# Patient Record
Sex: MALE | Race: WHITE | Employment: OTHER | URBAN - METROPOLITAN AREA
[De-identification: names, ages, dates, MRNs, and addresses within clinical notes are randomized per-mention and may not be internally consistent; named-entity substitution may affect disease eponyms.]

---

## 2016-07-19 LAB — COLONOSCOPY, EXTERNAL: NORMAL

## 2017-01-05 ENCOUNTER — HOSPITAL ENCOUNTER (EMERGENCY)
Age: 67
Discharge: HOME | End: 2017-01-05
Attending: EMERGENCY MEDICINE

## 2017-01-05 ENCOUNTER — HOSPITAL ENCOUNTER (OUTPATIENT)
Dept: LAB | Age: 67
Discharge: HOME | End: 2017-01-05

## 2017-01-05 ENCOUNTER — APPOINTMENT (OUTPATIENT)
Dept: GENERAL RADIOLOGY | Age: 67
End: 2017-01-05
Attending: PHYSICIAN ASSISTANT

## 2017-01-05 VITALS
WEIGHT: 177 LBS | RESPIRATION RATE: 16 BRPM | HEART RATE: 86 BPM | HEIGHT: 70 IN | OXYGEN SATURATION: 100 % | TEMPERATURE: 98.3 F | BODY MASS INDEX: 25.34 KG/M2 | DIASTOLIC BLOOD PRESSURE: 77 MMHG | SYSTOLIC BLOOD PRESSURE: 135 MMHG

## 2017-01-05 DIAGNOSIS — J06.9 ACUTE UPPER RESPIRATORY INFECTION: Primary | ICD-10-CM

## 2017-01-05 LAB — S PYO AG THROAT QL: NEGATIVE

## 2017-01-05 PROCEDURE — 87070 CULTURE OTHR SPECIMN AEROBIC: CPT | Performed by: PHYSICIAN ASSISTANT

## 2017-01-05 RX ORDER — CODEINE PHOSPHATE AND GUAIFENESIN 10; 100 MG/5ML; MG/5ML
5 SOLUTION ORAL
Qty: 120 ML | Refills: 0 | Status: SHIPPED | OUTPATIENT
Start: 2017-01-05 | End: 2020-01-09 | Stop reason: ALTCHOICE

## 2017-01-05 NOTE — DISCHARGE INSTRUCTIONS

## 2017-01-05 NOTE — UC PROVIDER NOTE
Patient is a 78 y.o. male presenting with sinus pain. The history is provided by the patient. Sinus Pain    This is a new problem. The current episode started more than 2 days ago. The problem has not changed since onset. There has been no fever. The pain is moderate. The pain has been intermittent since onset. Associated symptoms include congestion, sinus pressure, cough, rhinorrhea and headaches. Pertinent negatives include no chills, no sweats, no ear pain and no sore throat. Past Medical History   Diagnosis Date    Benign bladder papilloma     CAD (coronary artery disease) 10/7/2009     s/p CABG x 5, radial artery and internal mammary, nl stress test 3/08    Carotid artery plaque      mild on life line screening 6/11    Cataract     Diverticulosis     ED (erectile dysfunction) 10/7/2009    Hyperlipidemia LDL goal < 70 10/7/2009    Hypertension 10/7/2009    Keratitis     Low back pain 10/7/2009        Past Surgical History   Procedure Laterality Date    Endoscopy, colon, diagnostic       no polyps 2006    Pr cardiac surg procedure unlist       CABG x 5 vessels    Hx tonsillectomy      Hx other surgical       pilonidal cyst removed    Hx coronary artery bypass graft       x 5 arteries     Colonoscopy N/A 7/19/2016     COLONOSCOPY performed by [facility] at [facility] ENDOSCOPY    Hx colonoscopy           Family History   Problem Relation Age of Onset    Arthritis-osteo Mother     Hypertension Mother     Stroke Mother     High Cholesterol Mother     Cataract Mother     Other Mother      duodenal ulcer    Heart Disease Father     Cataract Father     Thyroid Disease Sister     High Cholesterol Sister     Cataract Sister         Social History     Social History    Marital status:      Spouse name: N/A    Number of children: N/A    Years of education: N/A     Occupational History    Not on file.      Social History Main Topics    Smoking status: Former Smoker     Quit date: 3/31/1980    Smokeless tobacco: Never Used    Alcohol use Yes      Comment: occasional    Drug use: No    Sexual activity: Yes     Partners: Female     Other Topics Concern    Not on file     Social History Narrative                ALLERGIES: Review of patient's allergies indicates no known allergies. Review of Systems   Constitutional: Negative for chills. HENT: Positive for congestion, rhinorrhea and sinus pressure. Negative for ear pain and sore throat. Respiratory: Positive for cough. Neurological: Positive for headaches. Vitals:    01/05/17 0909   BP: 135/77   Pulse: 86   Resp: 16   Temp: 98.3 °F (36.8 °C)   SpO2: 100%   Weight: 80.3 kg (177 lb)   Height: 5' 10\" (1.778 m)       Physical Exam   Constitutional: He is oriented to person, place, and time. He appears well-developed and well-nourished. HENT:   Right Ear: External ear normal.   Left Ear: External ear normal.   Cardiovascular: Normal rate, regular rhythm and normal heart sounds. Pulmonary/Chest: Effort normal and breath sounds normal.   Neurological: He is alert and oriented to person, place, and time. Skin: Skin is warm. Psychiatric: He has a normal mood and affect. His behavior is normal. Thought content normal.   Nursing note and vitals reviewed. MDM     Differential Diagnosis; Clinical Impression; Plan:     CLINICAL IMPRESSION:  Acute upper respiratory infection  (primary encounter diagnosis)    Plan:  1. Robitussin TAMRA  2.   3.   Risk of Significant Complications, Morbidity, and/or Mortality:   Presenting problems: Moderate  Diagnostic procedures: Moderate  Management options:   Moderate  Progress:   Patient progress:  Stable      Procedures

## 2017-01-07 LAB
BACTERIA SPEC CULT: NORMAL
SERVICE CMNT-IMP: NORMAL

## 2017-01-13 ENCOUNTER — TELEPHONE (OUTPATIENT)
Dept: INTERNAL MEDICINE CLINIC | Age: 67
End: 2017-01-13

## 2017-01-13 DIAGNOSIS — R05.9 COUGH: Primary | ICD-10-CM

## 2017-01-13 RX ORDER — BENZONATATE 200 MG/1
200 CAPSULE ORAL
Qty: 30 CAP | Refills: 1 | Status: SHIPPED | OUTPATIENT
Start: 2017-01-13 | End: 2020-01-09 | Stop reason: ALTCHOICE

## 2017-04-14 ENCOUNTER — OFFICE VISIT (OUTPATIENT)
Dept: CARDIOLOGY CLINIC | Age: 67
End: 2017-04-14

## 2017-04-14 VITALS
HEART RATE: 60 BPM | WEIGHT: 172 LBS | RESPIRATION RATE: 16 BRPM | BODY MASS INDEX: 24.62 KG/M2 | DIASTOLIC BLOOD PRESSURE: 72 MMHG | OXYGEN SATURATION: 97 % | HEIGHT: 70 IN | SYSTOLIC BLOOD PRESSURE: 124 MMHG

## 2017-04-14 DIAGNOSIS — I25.10 CORONARY ARTERY DISEASE INVOLVING NATIVE CORONARY ARTERY OF NATIVE HEART WITHOUT ANGINA PECTORIS: Primary | ICD-10-CM

## 2017-04-14 DIAGNOSIS — I77.9 CAROTID ARTERY DISEASE WITHOUT CEREBRAL INFARCTION (HCC): ICD-10-CM

## 2017-04-14 DIAGNOSIS — E78.5 HYPERLIPIDEMIA WITH TARGET LDL LESS THAN 70: ICD-10-CM

## 2017-04-14 DIAGNOSIS — I10 ESSENTIAL HYPERTENSION: ICD-10-CM

## 2017-04-14 NOTE — PROGRESS NOTES
HISTORY OF PRESENT ILLNESS  Lyric Farias is a 78 y.o. male     SUMMARY:   Problem List  Date Reviewed: 4/14/2017          Codes Class Noted    Advanced directives, counseling/discussion ICD-10-CM: Z71.89  ICD-9-CM: V65.49  11/21/2016    Overview Signed 11/21/2016  9:43 AM by Maria Isabel Johnson MD     On file             Keratitis ICD-10-CM: H16.9  ICD-9-CM: 370.9  Unknown        Carotid artery disease without cerebral infarction ([facility] [place] [address] ICD-10-CM: I77.9  ICD-9-CM: 447.9  4/9/2015        Benign bladder papilloma ICD-10-CM: D30.3  ICD-9-CM: 223.3  Unknown        Cataract ICD-10-CM: H26.9  ICD-9-CM: 366.9  Unknown        Hypertension ICD-10-CM: I10  ICD-9-CM: 401.9  10/7/2009        Hyperlipidemia with target LDL less than 70 ICD-10-CM: E78.5  ICD-9-CM: 272.4  10/7/2009        CAD (coronary artery disease) ICD-10-CM: I25.10  ICD-9-CM: 414.00  10/7/2009    Overview Addendum 3/31/2011  8:24 AM by Roberto Wing MD     Mr. Lock's cardiac history dates back to September 2004 when he presented with progressive angina, had an abnormal stress test and then cardiac catheterization, which showed significant left main and three-vessel coronary artery disease. He then underwent five-vessel bypass using LIMA and a free radial artery. LV function was normal.             ED (erectile dysfunction) ICD-10-CM: N52.9  ICD-9-CM: 607.84  10/7/2009        Low back pain ICD-10-CM: M54.5  ICD-9-CM: 724.2  10/7/2009              Current Outpatient Prescriptions on File Prior to Visit   Medication Sig    benzonatate (TESSALON) 200 mg capsule Take 1 Cap by mouth three (3) times daily as needed for Cough.  guaiFENesin-codeine (ROBITUSSIN AC) 100-10 mg/5 mL solution Take 5 mL by mouth three (3) times daily as needed for Cough. Max Daily Amount: 15 mL.  atorvastatin (LIPITOR) 80 mg tablet Take 1 Tab by mouth daily.  atenolol (TENORMIN) 50 mg tablet Take 1 Tab by mouth daily.     VITAMIN B COMPLEX (B COMPLEX PO) Take  by mouth. Takes one po once daily.  MULTIVITAMIN PO Take 1 Tab by mouth daily.  FOLIC ACID PO Take 5 mg by mouth daily.  tadalafil (CIALIS) 20 mg tablet Take 20 mg by mouth as needed.  coenzyme Q-10 (CO Q-10) 200 mg capsule Take 1 Cap by mouth daily.  aspirin (ASPIRIN) 325 mg tablet Take 325 mg by mouth daily. No current facility-administered medications on file prior to visit. CARDIOLOGY STUDIES TO DATE:  4/11 normal stress echo  6/11 life line screen , mild bilateral carotid disease  4/13 carotid dopplers 10-49% right and 0-9% left stenoses  4/15 carotid dopplers 10-49% right and 0-9% left stenoses      Chief Complaint   Patient presents with    Coronary Artery Disease     HPI :  Mr. Yesy Durbin is doing well. He continues to exercise regularly without any worrisome symptoms, and he is not having any problems with his medications. He has not had his lipids checked since last year. He and his wife and another couple are planning a trip to the [facility] in June.      CARDIAC ROS:   negative for chest pain, dyspnea, palpitations, syncope, orthopnea, paroxysmal nocturnal dyspnea, exertional chest pressure/discomfort, claudication, lower extremity edema    Family History   Problem Relation Age of Onset    Arthritis-osteo Mother     Hypertension Mother     Stroke Mother     High Cholesterol Mother     Cataract Mother     Other Mother      duodenal ulcer    Heart Disease Father     Cataract Father     Thyroid Disease Sister     High Cholesterol Sister    [facility] Cataract Sister        Past Medical History:   Diagnosis Date    Benign bladder papilloma     CAD (coronary artery disease) 10/7/2009    s/p CABG x 5, radial artery and internal mammary, nl stress test 3/08    Carotid artery plaque     mild on life line screening 6/11    Cataract     Diverticulosis     ED (erectile dysfunction) 10/7/2009    Hyperlipidemia LDL goal < 70 10/7/2009    Hypertension 10/7/2009    Keratitis     Low back pain 10/7/2009       GENERAL ROS:  A comprehensive review of systems was negative except for that written in the HPI.     Visit Vitals    /72 (BP 1 Location: Left arm, BP Patient Position: Sitting)    Pulse 60    Resp 16    Ht 5' 10\" (1.778 m)    Wt 172 lb (78 kg)    SpO2 97%    BMI 24.68 kg/m2       Wt Readings from Last 3 Encounters:   04/14/17 172 lb (78 kg)   01/05/17 177 lb (80.3 kg)   11/21/16 172 lb (78 kg)            BP Readings from Last 3 Encounters:   04/14/17 124/72   01/05/17 135/77   11/21/16 123/69       PHYSICAL EXAM  General appearance: alert, cooperative, no distress, appears stated age  Neck: supple, symmetrical, trachea midline, no adenopathy, no carotid bruit and no JVD  Lungs: clear to auscultation bilaterally  Heart: regular rate and rhythm, S1, S2 normal, no murmur, click, rub or gallop  Extremities: extremities normal, atraumatic, no cyanosis or edema    Lab Results   Component Value Date/Time    Cholesterol, total 143 11/21/2016 09:59 AM    Cholesterol, total 131 04/20/2016 10:12 AM    Cholesterol, total 160 11/05/2015 11:38 AM    Cholesterol, total 153 12/19/2014 08:13 AM    Cholesterol, total 130 10/30/2014 11:20 AM    HDL Cholesterol 61 11/21/2016 09:59 AM    HDL Cholesterol 61 04/20/2016 10:12 AM    HDL Cholesterol 62 11/05/2015 11:38 AM    HDL Cholesterol 60 12/19/2014 08:13 AM    HDL Cholesterol 52 10/30/2014 11:20 AM    LDL, calculated 66 11/21/2016 09:59 AM    LDL, calculated 57 04/20/2016 10:12 AM    LDL, calculated 78 11/05/2015 11:38 AM    LDL, calculated 76 12/19/2014 08:13 AM    LDL, calculated 63 10/30/2014 11:20 AM    Triglyceride 82 11/21/2016 09:59 AM    Triglyceride 64 04/20/2016 10:12 AM    Triglyceride 98 11/05/2015 11:38 AM    Triglyceride 85 12/19/2014 08:13 AM    Triglyceride 76 10/30/2014 11:20 AM    CHOL/HDL Ratio 2.3 05/19/2010 10:52 AM    CHOL/HDL Ratio 2.5 11/09/2009 11:49 AM    CHOL/HDL Ratio 2.8 01/23/2009 09:41 AM     ASSESSMENT  Mr. Lock is stable, asymptomatic and well-compensated on a good medical regimen. We are going to send him for a fasting lipid profile, and we will repeat his carotid Dopplers when he returns for follow-up next year. current treatment plan is effective, no change in therapy  lab results and schedule of future lab studies reviewed with patient  reviewed diet, exercise and weight control    Encounter Diagnoses   Name Primary?  Coronary artery disease involving native coronary artery of native heart without angina pectoris Yes    Essential hypertension     Hyperlipidemia with target LDL less than 70     Carotid artery disease without cerebral infarction ([facility] [address]      No orders of the defined types were placed in this encounter. Follow-up Disposition:  Return in about 1 year (around 4/14/2018).     Azael Roman MD  4/14/2017

## 2017-04-14 NOTE — MR AVS SNAPSHOT
Visit Information Date & Time Provider Department Dept. Phone Encounter #  
 4/14/2017 10:00 AM Morris Graves MD [facility] [phone] [id number] Follow-up Instructions Return in about 1 year (around 4/14/2018). Upcoming Health Maintenance Date Due DTaP/Tdap/Td series (2 - Td) 9/1/2016 MEDICARE YEARLY EXAM 11/22/2017 GLAUCOMA SCREENING Q2Y 12/5/2018 COLONOSCOPY 7/19/2026 Allergies as of 4/14/2017  Review Complete On: 4/14/2017 By: Morris Graves MD  
 No Known Allergies Current Immunizations  Reviewed on 11/21/2016 Name Date Influenza Vaccine 11/7/2014 Influenza Vaccine (Quad) PF 11/3/2016, 10/22/2015, 10/24/2013 Influenza Vaccine Split 10/9/2012, 10/12/2011, 10/11/2010, 10/13/2009 Pneumococcal Conjugate (PCV-13) 4/2/2015 Pneumococcal Polysaccharide (PPSV-23) 5/12/2016 TDAP Vaccine 9/1/2006 Zoster Vaccine, Live 9/4/2013 Not reviewed this visit You Were Diagnosed With   
  
 Codes Comments Coronary artery disease involving native coronary artery of native heart without angina pectoris    -  Primary ICD-10-CM: I25.10 ICD-9-CM: 414.01 Essential hypertension     ICD-10-CM: I10 
ICD-9-CM: 401.9 Hyperlipidemia with target LDL less than 70     ICD-10-CM: E78.5 ICD-9-CM: 272.4 Carotid artery disease without cerebral infarction [facility])     ICD-10-CM: I77.9 ICD-9-CM: 033. 9 Vitals BP Pulse Resp Height(growth percentile) Weight(growth percentile) SpO2  
 124/72 (BP 1 Location: Left arm, BP Patient Position: Sitting) 60 16 5' 10\" (1.778 m) 172 lb (78 kg) 97% BMI Smoking Status 24.68 kg/m2 Former Smoker BMI and BSA Data Body Mass Index Body Surface Area  
 24.68 kg/m 2 1.96 m 2 Preferred Pharmacy Pharmacy Name Phone [id number] [address] [facility] [phone] Your Updated Medication List  
  
   
This list is accurate as of: 4/14/17 10:25 AM.  Always use your most recent med list.  
  
  
  
  
 aspirin 325 mg tablet Commonly known as:  ASPIRIN Take 325 mg by mouth daily. atenolol 50 mg tablet Commonly known as:  TENORMIN Take 1 Tab by mouth daily. atorvastatin 80 mg tablet Commonly known as:  LIPITOR Take 1 Tab by mouth daily. B COMPLEX PO Take  by mouth. Takes one po once daily. benzonatate 200 mg capsule Commonly known as:  TESSALON Take 1 Cap by mouth three (3) times daily as needed for Cough. coenzyme Q-10 200 mg capsule Commonly known as:  CO Q-10 Take 1 Cap by mouth daily. FOLIC ACID PO Take 5 mg by mouth daily. guaiFENesin-codeine 100-10 mg/5 mL solution Commonly known as:  ROBITUSSIN AC Take 5 mL by mouth three (3) times daily as needed for Cough. Max Daily Amount: 15 mL. MULTIVITAMIN PO Take 1 Tab by mouth daily. tadalafil 20 mg tablet Commonly known as:  CIALIS Take 20 mg by mouth as needed. Follow-up Instructions Return in about 1 year (around 4/14/2018). Introducing [facility]! Dear Flaco Catalan: Thank you for requesting a [employer] account. Our records indicate that you already have an active [employer] account. You can access your account anytime at [email or web address] Did you know that you can access your hospital and ER discharge instructions at any time in [employer]? You can also review all of your test results from your hospital stay or ER visit. Additional Information If you have questions, please visit the Frequently Asked Questions section of the [employer] website at [email or web address] Remember, [employer] is NOT to be used for urgent needs. For medical emergencies, dial 911. Now available from your iPhone and Android! Please provide this summary of care documentation to your next provider. Your primary care clinician is listed as [id number] If you have any questions after today's visit, please call [phone].

## 2017-04-14 NOTE — LETTER
6/1/2017 10:30 AM 
 
Mr. Carrie Ayala [address] [facility] [address] Dear Carrie Ayala: Please find your most recent results below. Resulted Orders LIPID PANEL Result Value Ref Range Cholesterol, total 153 100 - 199 mg/dL Triglyceride 87 0 - 149 mg/dL HDL Cholesterol 58 >39 mg/dL VLDL, calculated 17 5 - 40 mg/dL LDL, calculated 78 0 - 99 mg/dL Narrative Performed at:  [address]  [id number] : Janette Alicea MD, Phone:  [phone] METABOLIC PANEL, COMPREHENSIVE Result Value Ref Range Glucose 93 65 - 99 mg/dL BUN 14 8 - 27 mg/dL Creatinine 1.01 0.76 - 1.27 mg/dL GFR est non-AA 77 >59 mL/min/1.73 GFR est AA 89 >59 mL/min/1.73  
 BUN/Creatinine ratio 14 10 - 24 Sodium 144 134 - 144 mmol/L Potassium 4.5 3.5 - 5.2 mmol/L Chloride 102 96 - 106 mmol/L  
 CO2 24 18 - 29 mmol/L Calcium 9.2 8.6 - 10.2 mg/dL Protein, total 6.9 6.0 - 8.5 g/dL Albumin 4.7 3.6 - 4.8 g/dL GLOBULIN, TOTAL 2.2 1.5 - 4.5 g/dL A-G Ratio 2.1 1.2 - 2.2 Bilirubin, total 0.5 0.0 - 1.2 mg/dL Alk. phosphatase 70 39 - 117 IU/L  
 AST (SGOT) 29 0 - 40 IU/L  
 ALT (SGPT) 23 0 - 44 IU/L Narrative Performed at:  [address]  [id number] : Janette Alicea MD, Phone:  [phone] CVD REPORT Result Value Ref Range INTERPRETATION Note Comment:  
   Supplement report is available. Narrative Performed at:  [address] [place] [address]  [id number] : Praful Cutler PhD, Phone:  [phone] RECOMMENDATIONS: Labs look great! Stay on medications and we will repeat labs in 6 months Please call me if you have any questions: [phone] Sincerely, Regine Alcocerr, MD Jalen Newell., RN

## 2017-05-31 ENCOUNTER — HOSPITAL ENCOUNTER (OUTPATIENT)
Dept: LAB | Age: 67
Discharge: HOME | End: 2017-05-31
Payer: MEDICARE

## 2017-05-31 PROCEDURE — 80061 LIPID PANEL: CPT

## 2017-05-31 PROCEDURE — 80053 COMPREHEN METABOLIC PANEL: CPT

## 2017-05-31 PROCEDURE — 36415 COLL VENOUS BLD VENIPUNCTURE: CPT

## 2017-06-01 LAB
ALBUMIN SERPL-MCNC: 4.7 G/DL (ref 3.6–4.8)
ALBUMIN/GLOB SERPL: 2.1 {RATIO} (ref 1.2–2.2)
ALP SERPL-CCNC: 70 IU/L (ref 39–117)
ALT SERPL-CCNC: 23 IU/L (ref 0–44)
AST SERPL-CCNC: 29 IU/L (ref 0–40)
BILIRUB SERPL-MCNC: 0.5 MG/DL (ref 0–1.2)
BUN SERPL-MCNC: 14 MG/DL (ref 8–27)
BUN/CREAT SERPL: 14 (ref 10–24)
CALCIUM SERPL-MCNC: 9.2 MG/DL (ref 8.6–10.2)
CHLORIDE SERPL-SCNC: 102 MMOL/L (ref 96–106)
CHOLEST SERPL-MCNC: 153 MG/DL (ref 100–199)
CO2 SERPL-SCNC: 24 MMOL/L (ref 18–29)
CREAT SERPL-MCNC: 1.01 MG/DL (ref 0.76–1.27)
GLOBULIN SER CALC-MCNC: 2.2 G/DL (ref 1.5–4.5)
GLUCOSE SERPL-MCNC: 93 MG/DL (ref 65–99)
HDLC SERPL-MCNC: 58 MG/DL
INTERPRETATION, 910389: NORMAL
LDLC SERPL CALC-MCNC: 78 MG/DL (ref 0–99)
POTASSIUM SERPL-SCNC: 4.5 MMOL/L (ref 3.5–5.2)
PROT SERPL-MCNC: 6.9 G/DL (ref 6–8.5)
SODIUM SERPL-SCNC: 144 MMOL/L (ref 134–144)
TRIGL SERPL-MCNC: 87 MG/DL (ref 0–149)
VLDLC SERPL CALC-MCNC: 17 MG/DL (ref 5–40)

## 2017-09-26 DIAGNOSIS — I10 ESSENTIAL HYPERTENSION: Primary | ICD-10-CM

## 2017-09-26 DIAGNOSIS — I25.10 CORONARY ARTERY DISEASE INVOLVING NATIVE CORONARY ARTERY OF NATIVE HEART WITHOUT ANGINA PECTORIS: ICD-10-CM

## 2017-09-26 RX ORDER — METOPROLOL SUCCINATE 50 MG/1
50 TABLET, EXTENDED RELEASE ORAL DAILY
Qty: 90 TAB | Refills: 3 | Status: SHIPPED | OUTPATIENT
Start: 2017-09-26 | End: 2018-09-28 | Stop reason: SDUPTHER

## 2017-10-19 ENCOUNTER — CLINICAL SUPPORT (OUTPATIENT)
Dept: INTERNAL MEDICINE CLINIC | Age: 67
End: 2017-10-19

## 2017-10-19 DIAGNOSIS — Z23 ENCOUNTER FOR IMMUNIZATION: Primary | ICD-10-CM

## 2017-10-19 NOTE — PROGRESS NOTES
Karoline Krishna is a 75 y.o. male who presents for Influenza immunization. He denies any symptoms , reactions or allergies that would exclude them from being immunized today. Risks and adverse reactions were discussed and the VIS was given to them. All questions were addressed. He was observed for 10 min post injection. There were no reactions observed. Dr. Jorge Love gave verbal order to administer influenza vaccine.     Lionel Gardner LPN

## 2017-11-08 ENCOUNTER — HOSPITAL ENCOUNTER (OUTPATIENT)
Dept: LAB | Age: 67
Discharge: HOME | End: 2017-11-08
Payer: MEDICARE

## 2017-11-08 ENCOUNTER — OFFICE VISIT (OUTPATIENT)
Dept: INTERNAL MEDICINE CLINIC | Age: 67
End: 2017-11-08

## 2017-11-08 VITALS
DIASTOLIC BLOOD PRESSURE: 78 MMHG | SYSTOLIC BLOOD PRESSURE: 137 MMHG | RESPIRATION RATE: 16 BRPM | BODY MASS INDEX: 25.51 KG/M2 | OXYGEN SATURATION: 97 % | HEART RATE: 58 BPM | HEIGHT: 70 IN | TEMPERATURE: 98.1 F | WEIGHT: 178.2 LBS

## 2017-11-08 DIAGNOSIS — R53.83 FATIGUE, UNSPECIFIED TYPE: ICD-10-CM

## 2017-11-08 DIAGNOSIS — E78.5 HYPERLIPIDEMIA, UNSPECIFIED HYPERLIPIDEMIA TYPE: ICD-10-CM

## 2017-11-08 DIAGNOSIS — E55.9 VITAMIN D DEFICIENCY: ICD-10-CM

## 2017-11-08 DIAGNOSIS — I25.10 CORONARY ARTERY DISEASE INVOLVING NATIVE CORONARY ARTERY OF NATIVE HEART WITHOUT ANGINA PECTORIS: ICD-10-CM

## 2017-11-08 DIAGNOSIS — R05.9 COUGH: ICD-10-CM

## 2017-11-08 DIAGNOSIS — N40.0 BENIGN PROSTATIC HYPERPLASIA WITHOUT LOWER URINARY TRACT SYMPTOMS: ICD-10-CM

## 2017-11-08 DIAGNOSIS — R73.9 HYPERGLYCEMIA: ICD-10-CM

## 2017-11-08 DIAGNOSIS — Z00.00 INITIAL MEDICARE ANNUAL WELLNESS VISIT: Primary | ICD-10-CM

## 2017-11-08 DIAGNOSIS — I77.9 CAROTID ARTERY DISEASE WITHOUT CEREBRAL INFARCTION (HCC): ICD-10-CM

## 2017-11-08 DIAGNOSIS — I10 ESSENTIAL HYPERTENSION: ICD-10-CM

## 2017-11-08 DIAGNOSIS — E53.8 B12 DEFICIENCY: ICD-10-CM

## 2017-11-08 DIAGNOSIS — Z12.5 PROSTATE CANCER SCREENING: ICD-10-CM

## 2017-11-08 DIAGNOSIS — E78.5 HYPERLIPIDEMIA WITH TARGET LDL LESS THAN 70: ICD-10-CM

## 2017-11-08 PROCEDURE — 80053 COMPREHEN METABOLIC PANEL: CPT

## 2017-11-08 PROCEDURE — 84443 ASSAY THYROID STIM HORMONE: CPT

## 2017-11-08 PROCEDURE — 80061 LIPID PANEL: CPT

## 2017-11-08 PROCEDURE — 83036 HEMOGLOBIN GLYCOSYLATED A1C: CPT

## 2017-11-08 PROCEDURE — 84439 ASSAY OF FREE THYROXINE: CPT

## 2017-11-08 PROCEDURE — 84153 ASSAY OF PSA TOTAL: CPT

## 2017-11-08 PROCEDURE — 82607 VITAMIN B-12: CPT

## 2017-11-08 PROCEDURE — 82306 VITAMIN D 25 HYDROXY: CPT

## 2017-11-08 PROCEDURE — 82550 ASSAY OF CK (CPK): CPT

## 2017-11-08 PROCEDURE — 85025 COMPLETE CBC W/AUTO DIFF WBC: CPT

## 2017-11-08 PROCEDURE — 84403 ASSAY OF TOTAL TESTOSTERONE: CPT

## 2017-11-08 RX ORDER — ATORVASTATIN CALCIUM 80 MG/1
80 TABLET, FILM COATED ORAL DAILY
Qty: 90 TAB | Refills: 3 | Status: SHIPPED | OUTPATIENT
Start: 2017-11-08 | End: 2018-12-03 | Stop reason: SDUPTHER

## 2017-11-08 NOTE — MR AVS SNAPSHOT
Visit Information Date & Time Provider Department Dept. Phone Encounter #  
 11/8/2017  9:00 AM Ashok [address] and Internal Medicine [phone] [id number] Follow-up Instructions Return in about 1 year (around 11/8/2018), or if symptoms worsen or fail to improve, for [facility] Wellness Visit, or as indicated based on labs. Your Appointments 4/13/2018 10:20 AM  
ESTABLISHED PATIENT with Michelle Gracia MD  
[facility] ([address]) Appt Note: one year follow up  
 [address] [facility] [address]  
[address] [facility],[address] [address] Upcoming Health Maintenance Date Due DTaP/Tdap/Td series (2 - Td) 9/1/2016 MEDICARE YEARLY EXAM 11/22/2017 GLAUCOMA SCREENING Q2Y 12/5/2018 COLONOSCOPY 7/19/2026 Allergies as of 11/8/2017  Review Complete On: 11/8/2017 By: Eloisa Laguna MD  
 No Known Allergies Current Immunizations  Reviewed on 11/8/2017 Name Date Influenza High Dose Vaccine PF 10/19/2017 Influenza Vaccine 11/7/2014 Influenza Vaccine (Quad) PF 11/3/2016, 10/22/2015, 10/24/2013 Influenza Vaccine Split 10/9/2012, 10/12/2011, 10/11/2010, 10/13/2009 Pneumococcal Conjugate (PCV-13) 4/2/2015 Pneumococcal Polysaccharide (PPSV-23) 5/12/2016 TDAP Vaccine 9/1/2006 Zoster Vaccine, Live 9/4/2013 Reviewed by Eloisa Laguna MD on 11/8/2017 at  9:52 AM  
You Were Diagnosed With   
  
 Codes Comments Fatigue, unspecified type    -  Primary ICD-10-CM: R53.83 ICD-9-CM: 780.79 Hyperlipidemia with target LDL less than 70     ICD-10-CM: E78.5 ICD-9-CM: 272.4 Essential hypertension     ICD-10-CM: I10 
ICD-9-CM: 401.9 Carotid artery disease without cerebral infarction [facility])     ICD-10-CM: I77.9 ICD-9-CM: 447.9  Coronary artery disease involving native coronary artery of native heart without angina pectoris     ICD-10-CM: I25.10 ICD-9-CM: 414.01 Hyperglycemia     ICD-10-CM: R73.9 ICD-9-CM: 790.29 Prostate cancer screening     ICD-10-CM: Z12.5 ICD-9-CM: V76.44 Benign prostatic hyperplasia without lower urinary tract symptoms     ICD-10-CM: N40.0 ICD-9-CM: 600.00   
 B12 deficiency     ICD-10-CM: E53.8 ICD-9-CM: 266.2 Vitamin D deficiency     ICD-10-CM: E55.9 ICD-9-CM: 268.9 Initial Medicare annual wellness visit     ICD-10-CM: Z00.00 ICD-9-CM: V70.0 Hyperlipidemia, unspecified hyperlipidemia type     ICD-10-CM: E78.5 ICD-9-CM: 272.4 Cough     ICD-10-CM: R05 ICD-9-CM: 540. 2 Vitals BP Pulse Temp Resp Height(growth percentile) Weight(growth percentile)  
 137/78 (BP 1 Location: Left arm, BP Patient Position: Sitting) (!) 58 98.1 °F (36.7 °C) (Oral) 16 5' 10\" (1.778 m) 178 lb 3.2 oz (80.8 kg) SpO2 BMI Smoking Status 97% 25.57 kg/m2 Former Smoker BMI and BSA Data Body Mass Index Body Surface Area 25.57 kg/m 2 2 m 2 Preferred Pharmacy Pharmacy Name Phone [facility] [phone] Your Updated Medication List  
  
   
This list is accurate as of: 11/8/17 10:19 AM.  Always use your most recent med list.  
  
  
  
  
 aspirin 325 mg tablet Commonly known as:  ASPIRIN Take 325 mg by mouth daily. atorvastatin 80 mg tablet Commonly known as:  LIPITOR Take 1 Tab by mouth daily. B COMPLEX PO Take  by mouth. Takes one po once daily. benzonatate 200 mg capsule Commonly known as:  TESSALON Take 1 Cap by mouth three (3) times daily as needed for Cough. coenzyme Q-10 200 mg capsule Commonly known as:  CO Q-10 Take 1 Cap by mouth daily. FOLIC ACID PO Take 5 mg by mouth daily. guaiFENesin-codeine 100-10 mg/5 mL solution Commonly known as:  ROBITUSSIN AC  
 Take 5 mL by mouth three (3) times daily as needed for Cough. Max Daily Amount: 15 mL. metoprolol succinate 50 mg XL tablet Commonly known as:  TOPROL-XL Take 1 Tab by mouth daily. MULTIVITAMIN PO Take 1 Tab by mouth daily. tadalafil 20 mg tablet Commonly known as:  CIALIS Take 20 mg by mouth as needed. Prescriptions Sent to Pharmacy Refills  
 atorvastatin (LIPITOR) 80 mg tablet 3 Sig: Take 1 Tab by mouth daily. Class: Normal  
 Pharmacy: [address] [place] [address], [address] [place] [address] Ph #: [phone] Route: Oral  
  
We Performed the Following CBC WITH AUTOMATED DIFF [67345 CPT(R)] CK [id number] CPT(R)] HEMOGLOBIN A1C WITH EAG [50928 CPT(R)] LIPID PANEL [79585 CPT(R)] METABOLIC PANEL, COMPREHENSIVE [40951 CPT(R)] PSA, DIAGNOSTIC (PROSTATE SPECIFIC AG) [id number] CPT(R)] T4, FREE [id number] CPT(R)] TESTOSTERONE, TOTAL, ADULT MALE [87734 CPT(R)] TSH 3RD GENERATION [51329 CPT(R)] VITAMIN B12 G9887383 CPT(R)] VITAMIN D, 25 HYDROXY T4019920 CPT(R)] Follow-up Instructions Return in about 1 year (around 11/8/2018), or if symptoms worsen or fail to improve, for [facility] Wellness Visit, or as indicated based on labs. Patient Instructions Medicare Wellness Visit, Male The best way to live healthy is to have a healthy lifestyle by eating a well-balanced diet, exercising regularly, limiting alcohol and stopping smoking. Regular physical exams and screening tests are another way to keep healthy. Preventive exams provided by your health care provider can find health problems before they become diseases or illnesses. Preventive services including immunizations, screening tests, monitoring and exams can help you take care of your own health. All people over age 72 should have a pneumovax  and and a prevnar shot to prevent pneumonia.  These are once in a lifetime unless you and your provider decide differently. All people over 65 should have a yearly flu shot and a tetanus vaccine every 10 years. Screening for diabetes mellitus with a blood sugar test should be done every year. Glaucoma is a disease of the eye due to increased ocular pressure that can lead to blindness and it should be done every year by an eye professional. 
 
Cardiovascular screening tests that check for elevated lipids (fatty part of blood) which can lead to heart disease and strokes should be done every 5 years. Colorectal screening that evaluates for blood or polyps in your colon should be done yearly as a stool test or every five years as a flexible sigmoidoscope or every 10 years as a colonoscopy up to age 76. Men up to age 76 may need a screening blood test for prostate cancer at certain intervals, depending on their personal and family history. This decision is between the patient and his provider. If you have been a smoker or had family history of abdominal aortic aneurysms, you and your provider may decide to schedule an ultrasound test of your aorta. Hepatitis C screening is also recommended for anyone born between [birth year] through [address]. A shingles vaccine is also recommended once in a lifetime after age 61. Your Medicare Wellness Exam is recommended annually. Here is a list of your current Health Maintenance items with a due date: 
Health Maintenance Due Topic Date Due  
 DTaP/Tdap/Td  (2 - Td) 09/01/2016 Ask pharmacy about Tdap vaccine. Introducing [facility] & HEALTH SERVICES! Dear Mary Ann Steele: Thank you for requesting a [employer] account. Our records indicate that you already have an active [employer] account. You can access your account anytime at [email or web address] Did you know that you can access your hospital and ER discharge instructions at any time in [employer]? You can also review all of your test results from your hospital stay or ER visit. Additional Information If you have questions, please visit the Frequently Asked Questions section of the [employer] website at [email or web address] "[email or web address] [email or web address] Remember, [employer] is NOT to be used for urgent needs. For medical emergencies, dial 911. Now available from your iPhone and Android! Please provide this summary of care documentation to your next provider. Your primary care clinician is listed as [address] If you have any questions after today's visit, please call [phone].

## 2017-11-08 NOTE — PROGRESS NOTES
Rm 15    Chief Complaint   Patient presents with   [facility] Annual Wellness Visit     1. Have you been to the ER, urgent care clinic since your last visit? Hospitalized since your last visit? 1/5/17 UC, sinus pain    2. Have you seen or consulted any other health care providers outside of the [address] [place] [address] since your last visit? Include any pap smears or colon screening. No    Health Maintenance Due   Topic Date Due    DTaP/Tdap/Td series (2 - Td) 09/01/2016     Fall Risk Assessment, last 12 mths 11/8/2017   Able to walk? Yes   Fall in past 12 months? No       PHQ over the last two weeks 11/8/2017   Little interest or pleasure in doing things Not at all   Feeling down, depressed or hopeless Not at all   Total Score PHQ 2 0     ADL Assessment 11/8/2017   Feeding yourself No Help Needed   Getting from bed to chair No Help Needed   Getting dressed No Help Needed   Bathing or showering No Help Needed   Walk across the room (includes cane/walker) No Help Needed   Using the telphone No Help Needed   Taking your medications No Help Needed   Preparing meals No Help Needed   Managing money (expenses/bills) No Help Needed   Moderately strenuous housework (laundry) No Help Needed   Shopping for personal items (toiletries/medicines) No Help Needed   Shopping for groceries No Help Needed   Driving No Help Needed   Climbing a flight of stairs No Help Needed   Getting to places beyond walking distances No Help Needed     Abuse Screening Questionnaire 11/8/2017   Do you ever feel afraid of your partner? N   Are you in a relationship with someone who physically or mentally threatens you? N   Is it safe for you to go home?  Heather Paz

## 2017-11-08 NOTE — PROGRESS NOTES
HISTORY OF PRESENT ILLNESS  Karoline Krishna is a 75 y.o. male. HPI  Presents for f/u CAD, lipids, HTN, poor energy    Pt concerned re: testosterone  Less energy, wt gain, less interest, decreased libido    +med compliance  +med tolerance    No CP, SOB, neuro sx    Past medical, Social, and Family history reviewed  Medications reviewed and updated. ROS  Complete ROS reviewed and negative or stable except as noted in HPI. Physical Exam   Constitutional: He is oriented to person, place, and time. He appears well-nourished. No distress. HENT:   Head: Normocephalic and atraumatic. Mouth/Throat: Oropharynx is clear and moist. No oropharyngeal exudate. Eyes: EOM are normal. Pupils are equal, round, and reactive to light. No scleral icterus. Neck: Normal range of motion. Neck supple. No JVD present. No thyromegaly present. Cardiovascular: Normal rate, regular rhythm and normal heart sounds. Exam reveals no gallop and no friction rub. No murmur heard. Pulmonary/Chest: Effort normal and breath sounds normal. No respiratory distress. He has no wheezes. He has no rales. Abdominal: Soft. Bowel sounds are normal. He exhibits no distension. There is no tenderness. Genitourinary: Rectum normal and prostate normal.   Genitourinary Comments: Mild prostate enlargement, no nodule or mass   Musculoskeletal: Normal range of motion. He exhibits no edema. Lymphadenopathy:     He has no cervical adenopathy. Neurological: He is alert and oriented to person, place, and time. He exhibits normal muscle tone. Coordination normal.   Skin: Skin is warm. No rash noted. Psychiatric: He has a normal mood and affect. Nursing note and vitals reviewed. Prior labs reviewed. Reviewed prior imaging reports    ASSESSMENT and PLAN    ICD-10-CM ICD-9-CM    1. Fatigue, unspecified type R53.83 780.79 CBC WITH AUTOMATED DIFF      TESTOSTERONE, TOTAL, ADULT MALE      T4, FREE      TSH 3RD GENERATION      VITAMIN B12   2. Hyperlipidemia with target LDL less than 70 E78.5 272.4 CK      LIPID PANEL      METABOLIC PANEL, COMPREHENSIVE   3. Essential hypertension I10 401.9 CBC WITH AUTOMATED DIFF   4. Carotid artery disease without cerebral infarction (HCC) I77.9 447.9    5. Coronary artery disease involving native coronary artery of native heart without angina pectoris I25.10 414.01    6. Hyperglycemia R73.9 790.29 HEMOGLOBIN A1C WITH EAG   7. Prostate cancer screening Z12.5 V76.44 PSA, DIAGNOSTIC (PROSTATE SPECIFIC AG)   8. Benign prostatic hyperplasia without lower urinary tract symptoms N40.0 600.00 PSA, DIAGNOSTIC (PROSTATE SPECIFIC AG)   9. B12 deficiency E53.8 266.2 VITAMIN B12   10. Vitamin D deficiency E55.9 268.9 VITAMIN D, 25 HYDROXY   11. Initial Medicare annual wellness visit Z00.00 V70.0    12. Hyperlipidemia, unspecified hyperlipidemia type E78.5 272.4 atorvastatin (LIPITOR) 80 mg tablet   13. Cough R05 786.2      Follow-up Disposition:  Return in about 1 year (around 11/8/2018), or if symptoms worsen or fail to improve, for [facility] Wellness Visit, or as indicated based on labs.    results and schedule of future studies reviewed with patient  reviewed diet, exercise and weight  cardiovascular risk and specific lipid/LDL goals reviewed  reviewed medications and side effects in detail   Tdap at pharmacy  Reviewed shingles vaccine recs - deferred pending clarification on rec's and cost  Check labs

## 2017-11-08 NOTE — PROGRESS NOTES
This is an Initial Medicare Annual Wellness Exam (AWV) (Performed 12 months after IPPE or effective date of Medicare Part B enrollment, Once in a lifetime)    I have reviewed the patient's medical history in detail and updated the computerized patient record. History     Past Medical History:   Diagnosis Date    Benign bladder papilloma     CAD (coronary artery disease) 10/7/2009    s/p CABG x 5, radial artery and internal mammary, nl stress test 3/08    Carotid artery plaque     mild on life line screening 6/11    Cataract     Diverticulosis     ED (erectile dysfunction) 10/7/2009    Hyperlipidemia LDL goal < 70 10/7/2009    Hypertension 10/7/2009    Keratitis     Low back pain 10/7/2009      Past Surgical History:   Procedure Laterality Date    CARDIAC SURG PROCEDURE UNLIST      CABG x 5 vessels    COLONOSCOPY N/A 7/19/2016    COLONOSCOPY performed by Jorge White MD at [facility]. [address], COLON, DIAGNOSTIC      no polyps 2006    HX COLONOSCOPY      HX CORONARY ARTERY BYPASS GRAFT      x 5 arteries     HX OTHER SURGICAL      pilonidal cyst removed    HX TONSILLECTOMY       Current Outpatient Prescriptions   Medication Sig Dispense Refill    atorvastatin (LIPITOR) 80 mg tablet Take 1 Tab by mouth daily. 90 Tab 3    VITAMIN B COMPLEX (B COMPLEX PO) Take  by mouth. Takes one po once daily.  MULTIVITAMIN PO Take 1 Tab by mouth daily.  FOLIC ACID PO Take 5 mg by mouth daily.  tadalafil (CIALIS) 20 mg tablet Take 20 mg by mouth as needed. 12 Tab 11    coenzyme Q-10 (CO Q-10) 200 mg capsule Take 1 Cap by mouth daily. 30 Cap 11    aspirin (ASPIRIN) 325 mg tablet Take 325 mg by mouth daily.  metoprolol succinate (TOPROL-XL) 50 mg XL tablet Take 1 Tab by mouth daily. 90 Tab 3    benzonatate (TESSALON) 200 mg capsule Take 1 Cap by mouth three (3) times daily as needed for Cough.  30 Cap 1    guaiFENesin-codeine (ROBITUSSIN AC) 100-10 mg/5 mL solution Take 5 mL by mouth three (3) times daily as needed for Cough. Max Daily Amount: 15 mL. 120 mL 0     No Known Allergies  Family History   Problem Relation Age of Onset   [address] [facility] [address] Arthritis-osteo Mother     Hypertension Mother     Stroke Mother     High Cholesterol Mother     Cataract Mother     Other Mother      duodenal ulcer    Heart Disease Father     Cataract Father     Thyroid Disease Sister     High Cholesterol Sister     Cataract Sister      Social History   Substance Use Topics    Smoking status: Former Smoker     Quit date: 3/31/1980    Smokeless tobacco: Never Used    Alcohol use Yes      Comment: occasional     Patient Active Problem List   Diagnosis Code    Hypertension I10    Hyperlipidemia with target LDL less than 70 E78.5    CAD (coronary artery disease) I25.10    ED (erectile dysfunction) N52.9    Low back pain M54.5    Cataract H26.9    Benign bladder papilloma D30.3    Carotid artery disease without cerebral infarction ([facility] [address] I77.9    Keratitis H16.9    Advanced directives, counseling/discussion Z71.89       Depression Risk Factor Screening:     PHQ over the last two weeks 11/8/2017   Little interest or pleasure in doing things Not at all   Feeling down, depressed or hopeless Not at all   Total Score PHQ 2 0     Alcohol Risk Factor Screening: You do not drink alcohol or very rarely. Functional Ability and Level of Safety:     Hearing Loss  Hearing is good. Activities of Daily Living  The home contains: no safety equipment. Patient does total self care    Fall Risk  Fall Risk Assessment, last 12 mths 11/8/2017   Able to walk? Yes   Fall in past 12 months?  No       Abuse Screen  Patient is not abused    Cognitive Screening   Evaluation of Cognitive Function:  Has your family/caregiver stated any concerns about your memory: no      Patient Care Team   Patient Care Team:  Rome Berumen MD as PCP - Valerie Hermosillo MD (Cardiology)    Assessment/Plan   Education and counseling provided:  Are appropriate based on today's review and evaluation    ICD-10-CM ICD-9-CM    1. Initial Medicare annual wellness visit Z00.00 V70.0    2. Fatigue, unspecified type R53.83 780.79 CBC WITH AUTOMATED DIFF      TESTOSTERONE, TOTAL, ADULT MALE      T4, FREE      TSH 3RD GENERATION      VITAMIN B12   3. Hyperlipidemia with target LDL less than 70 E78.5 272.4 CK      LIPID PANEL      METABOLIC PANEL, COMPREHENSIVE   4. Essential hypertension I10 401.9 CBC WITH AUTOMATED DIFF   5. Carotid artery disease without cerebral infarction (HCC) I77.9 447.9    6. Coronary artery disease involving native coronary artery of native heart without angina pectoris I25.10 414.01    7. Hyperglycemia R73.9 790.29 HEMOGLOBIN A1C WITH EAG   8. Prostate cancer screening Z12.5 V76.44 PSA, DIAGNOSTIC (PROSTATE SPECIFIC AG)   9. Benign prostatic hyperplasia without lower urinary tract symptoms N40.0 600.00 PSA, DIAGNOSTIC (PROSTATE SPECIFIC AG)   10. B12 deficiency E53.8 266.2 VITAMIN B12   11. Vitamin D deficiency E55.9 268.9 VITAMIN D, 25 HYDROXY   12. Hyperlipidemia, unspecified hyperlipidemia type E78.5 272.4 atorvastatin (LIPITOR) 80 mg tablet   13. Cough R05 786.2      Follow-up Disposition:  Return in about 1 year (around 11/8/2018), or if symptoms worsen or fail to improve, for [facility] Wellness Visit, or as indicated based on labs.   results and schedule of future studies reviewed with patient  reviewed diet, exercise and weight   cardiovascular risk and specific lipid/LDL goals reviewed  reviewed medications and side effects in detail     Tdap at pharmacy  Consider new shingles vaccine once clarify cost, rec's

## 2017-11-08 NOTE — PATIENT INSTRUCTIONS

## 2017-11-09 LAB
25(OH)D3+25(OH)D2 SERPL-MCNC: 29.6 NG/ML (ref 30–100)
ALBUMIN SERPL-MCNC: 4.5 G/DL (ref 3.6–4.8)
ALBUMIN/GLOB SERPL: 2.1 {RATIO} (ref 1.2–2.2)
ALP SERPL-CCNC: 71 IU/L (ref 39–117)
ALT SERPL-CCNC: 22 IU/L (ref 0–44)
AST SERPL-CCNC: 31 IU/L (ref 0–40)
BASOPHILS # BLD AUTO: 0 X10E3/UL (ref 0–0.2)
BASOPHILS NFR BLD AUTO: 0 %
BILIRUB SERPL-MCNC: 0.5 MG/DL (ref 0–1.2)
BUN SERPL-MCNC: 13 MG/DL (ref 8–27)
BUN/CREAT SERPL: 14 (ref 10–24)
CALCIUM SERPL-MCNC: 9.1 MG/DL (ref 8.6–10.2)
CHLORIDE SERPL-SCNC: 104 MMOL/L (ref 96–106)
CHOLEST SERPL-MCNC: 159 MG/DL (ref 100–199)
CK SERPL-CCNC: 263 U/L (ref 24–204)
CO2 SERPL-SCNC: 28 MMOL/L (ref 18–29)
CREAT SERPL-MCNC: 0.9 MG/DL (ref 0.76–1.27)
EOSINOPHIL # BLD AUTO: 0.2 X10E3/UL (ref 0–0.4)
EOSINOPHIL NFR BLD AUTO: 3 %
ERYTHROCYTE [DISTWIDTH] IN BLOOD BY AUTOMATED COUNT: 13.4 % (ref 12.3–15.4)
EST. AVERAGE GLUCOSE BLD GHB EST-MCNC: 120 MG/DL
GLOBULIN SER CALC-MCNC: 2.1 G/DL (ref 1.5–4.5)
GLUCOSE SERPL-MCNC: 92 MG/DL (ref 65–99)
HBA1C MFR BLD: 5.8 % (ref 4.8–5.6)
HCT VFR BLD AUTO: 45.7 % (ref 37.5–51)
HDLC SERPL-MCNC: 55 MG/DL
HGB BLD-MCNC: 16 G/DL (ref 12.6–17.7)
IMM GRANULOCYTES # BLD: 0 X10E3/UL (ref 0–0.1)
IMM GRANULOCYTES NFR BLD: 0 %
LDLC SERPL CALC-MCNC: 84 MG/DL (ref 0–99)
LYMPHOCYTES # BLD AUTO: 1.8 X10E3/UL (ref 0.7–3.1)
LYMPHOCYTES NFR BLD AUTO: 27 %
MCH RBC QN AUTO: 31.3 PG (ref 26.6–33)
MCHC RBC AUTO-ENTMCNC: 35 G/DL (ref 31.5–35.7)
MCV RBC AUTO: 89 FL (ref 79–97)
MONOCYTES # BLD AUTO: 0.6 X10E3/UL (ref 0.1–0.9)
MONOCYTES NFR BLD AUTO: 9 %
NEUTROPHILS # BLD AUTO: 4 X10E3/UL (ref 1.4–7)
NEUTROPHILS NFR BLD AUTO: 61 %
PLATELET # BLD AUTO: 183 X10E3/UL (ref 150–379)
POTASSIUM SERPL-SCNC: 4.6 MMOL/L (ref 3.5–5.2)
PROT SERPL-MCNC: 6.6 G/DL (ref 6–8.5)
PSA SERPL-MCNC: 0.6 NG/ML (ref 0–4)
RBC # BLD AUTO: 5.12 X10E6/UL (ref 4.14–5.8)
SODIUM SERPL-SCNC: 145 MMOL/L (ref 134–144)
T4 FREE SERPL-MCNC: 0.95 NG/DL (ref 0.82–1.77)
TESTOST SERPL-MCNC: 475 NG/DL (ref 264–916)
TRIGL SERPL-MCNC: 102 MG/DL (ref 0–149)
TSH SERPL DL<=0.005 MIU/L-ACNC: 2.9 UIU/ML (ref 0.45–4.5)
VIT B12 SERPL-MCNC: 652 PG/ML (ref 211–946)
VLDLC SERPL CALC-MCNC: 20 MG/DL (ref 5–40)
WBC # BLD AUTO: 6.6 X10E3/UL (ref 3.4–10.8)

## 2017-12-13 ENCOUNTER — TELEPHONE (OUTPATIENT)
Dept: CARDIOLOGY CLINIC | Age: 67
End: 2017-12-13

## 2017-12-13 NOTE — TELEPHONE ENCOUNTER
Salina Garcia from [facility] is faxing over a request for clearance. The office will not be scheduling without the clearance.     Thanks

## 2017-12-13 NOTE — TELEPHONE ENCOUNTER
Estefani Bowling MD   You 30 minutes ago (2:37 PM)                 Ok for procedure, hold aspirin for 7 days (Routing comment)           Faxed note

## 2017-12-13 NOTE — TELEPHONE ENCOUNTER
[facility]/ Dr. Lanny Paredes' requesting cardiac clearance for patient to have a transurethral resection of bladder tumor under general anesthesia. Clearance to include holding aspirin. If okay, how long may he hold it? I will fax note to fax # [phone] ravinder Crocker. Phone # [phone].

## 2018-02-14 ENCOUNTER — APPOINTMENT (OUTPATIENT)
Dept: GENERAL RADIOLOGY | Age: 68
End: 2018-02-14
Attending: EMERGENCY MEDICINE
Payer: MEDICARE

## 2018-02-14 ENCOUNTER — HOSPITAL ENCOUNTER (EMERGENCY)
Age: 68
Discharge: HOME | End: 2018-02-14
Attending: EMERGENCY MEDICINE
Payer: MEDICARE

## 2018-02-14 VITALS
WEIGHT: 170 LBS | OXYGEN SATURATION: 94 % | HEIGHT: 69 IN | TEMPERATURE: 98.2 F | DIASTOLIC BLOOD PRESSURE: 93 MMHG | SYSTOLIC BLOOD PRESSURE: 160 MMHG | BODY MASS INDEX: 25.18 KG/M2 | HEART RATE: 71 BPM | RESPIRATION RATE: 16 BRPM

## 2018-02-14 DIAGNOSIS — R07.9 CHEST PAIN, UNSPECIFIED TYPE: Primary | ICD-10-CM

## 2018-02-14 LAB
ALBUMIN SERPL-MCNC: 3.9 G/DL (ref 3.5–5)
ALBUMIN/GLOB SERPL: 1 {RATIO} (ref 1.1–2.2)
ALP SERPL-CCNC: 69 U/L (ref 45–117)
ALT SERPL-CCNC: 31 U/L (ref 12–78)
ANION GAP SERPL CALC-SCNC: 6 MMOL/L (ref 5–15)
AST SERPL-CCNC: 28 U/L (ref 15–37)
BASOPHILS # BLD: 0 K/UL (ref 0–0.1)
BASOPHILS NFR BLD: 0 % (ref 0–1)
BILIRUB SERPL-MCNC: 0.4 MG/DL (ref 0.2–1)
BUN SERPL-MCNC: 14 MG/DL (ref 6–20)
BUN/CREAT SERPL: 13 (ref 12–20)
CALCIUM SERPL-MCNC: 9.1 MG/DL (ref 8.5–10.1)
CHLORIDE SERPL-SCNC: 104 MMOL/L (ref 97–108)
CK SERPL-CCNC: 183 U/L (ref 39–308)
CO2 SERPL-SCNC: 30 MMOL/L (ref 21–32)
CREAT SERPL-MCNC: 1.09 MG/DL (ref 0.7–1.3)
DIFFERENTIAL METHOD BLD: NORMAL
EOSINOPHIL # BLD: 0.1 K/UL (ref 0–0.4)
EOSINOPHIL NFR BLD: 1 % (ref 0–7)
ERYTHROCYTE [DISTWIDTH] IN BLOOD BY AUTOMATED COUNT: 13.2 % (ref 11.5–14.5)
GLOBULIN SER CALC-MCNC: 3.9 G/DL (ref 2–4)
GLUCOSE SERPL-MCNC: 90 MG/DL (ref 65–100)
HCT VFR BLD AUTO: 48.1 % (ref 36.6–50.3)
HGB BLD-MCNC: 16.1 G/DL (ref 12.1–17)
IMM GRANULOCYTES # BLD: 0 K/UL (ref 0–0.04)
IMM GRANULOCYTES NFR BLD AUTO: 0 % (ref 0–0.5)
LYMPHOCYTES # BLD: 1.9 K/UL (ref 0.8–3.5)
LYMPHOCYTES NFR BLD: 25 % (ref 12–49)
MCH RBC QN AUTO: 31.1 PG (ref 26–34)
MCHC RBC AUTO-ENTMCNC: 33.5 G/DL (ref 30–36.5)
MCV RBC AUTO: 93 FL (ref 80–99)
MONOCYTES # BLD: 0.9 K/UL (ref 0–1)
MONOCYTES NFR BLD: 12 % (ref 5–13)
NEUTS SEG # BLD: 4.5 K/UL (ref 1.8–8)
NEUTS SEG NFR BLD: 61 % (ref 32–75)
NRBC # BLD: 0 K/UL (ref 0–0.01)
NRBC BLD-RTO: 0 PER 100 WBC
PLATELET # BLD AUTO: 181 K/UL (ref 150–400)
PMV BLD AUTO: 9.9 FL (ref 8.9–12.9)
POTASSIUM SERPL-SCNC: 4 MMOL/L (ref 3.5–5.1)
PROT SERPL-MCNC: 7.8 G/DL (ref 6.4–8.2)
RBC # BLD AUTO: 5.17 M/UL (ref 4.1–5.7)
SODIUM SERPL-SCNC: 140 MMOL/L (ref 136–145)
TROPONIN I SERPL-MCNC: <0.04 NG/ML
TROPONIN I SERPL-MCNC: <0.04 NG/ML
WBC # BLD AUTO: 7.4 K/UL (ref 4.1–11.1)

## 2018-02-14 PROCEDURE — 84484 ASSAY OF TROPONIN QUANT: CPT | Performed by: EMERGENCY MEDICINE

## 2018-02-14 PROCEDURE — 71046 X-RAY EXAM CHEST 2 VIEWS: CPT

## 2018-02-14 PROCEDURE — 93005 ELECTROCARDIOGRAM TRACING: CPT

## 2018-02-14 PROCEDURE — 99284 EMERGENCY DEPT VISIT MOD MDM: CPT

## 2018-02-14 PROCEDURE — 82550 ASSAY OF CK (CPK): CPT | Performed by: EMERGENCY MEDICINE

## 2018-02-14 PROCEDURE — 36415 COLL VENOUS BLD VENIPUNCTURE: CPT | Performed by: EMERGENCY MEDICINE

## 2018-02-14 PROCEDURE — 80053 COMPREHEN METABOLIC PANEL: CPT | Performed by: EMERGENCY MEDICINE

## 2018-02-14 PROCEDURE — 85025 COMPLETE CBC W/AUTO DIFF WBC: CPT | Performed by: EMERGENCY MEDICINE

## 2018-02-14 RX ORDER — OMEPRAZOLE 10 MG/1
10 CAPSULE, DELAYED RELEASE ORAL DAILY
Qty: 20 CAP | Refills: 0 | Status: SHIPPED | OUTPATIENT
Start: 2018-02-14 | End: 2018-02-26 | Stop reason: DRUGHIGH

## 2018-02-14 NOTE — ED TRIAGE NOTES
Triage note: Pt states he has midsternal CP that is burning  Since last night. Pt had CABG in 2004 and this pain is the same. Pain is non-radiating. Denies N/V/dyspnea.

## 2018-02-14 NOTE — ED NOTES
5:08 PM  I have evaluated the patient as the Provider in Triage. I have reviewed His vital signs and the triage nurse assessment. I have talked with the patient and any available family and advised that I am the provider in triage and have ordered the appropriate study to initiate their work up based on the clinical presentation during my assessment. I have advised that the patient will be accommodated in the Main ED as soon as possible. I have also requested to contact the triage nurse or myself immediately if the patient experiences any changes in their condition during this brief waiting period. Pt presenting for midsternal chest pain. Onset last night. Pain constant burning sensation. No radiation. No associated nausea, vomiting, shortness of breath or difficulty breathing. No pain on exertion. No dizziness or lightheadedness. No abdominal pain. Pt with hx of CABG. Pain similar to this. Pt took tums  With no relief.     Cardiologist: Dr. Paula Moses: cxr, labs, ekg  Traci Vicente PA-C

## 2018-02-15 ENCOUNTER — TELEPHONE (OUTPATIENT)
Dept: CARDIOLOGY CLINIC | Age: 68
End: 2018-02-15

## 2018-02-15 DIAGNOSIS — I25.10 CORONARY ARTERY DISEASE INVOLVING NATIVE CORONARY ARTERY, ANGINA PRESENCE UNSPECIFIED, UNSPECIFIED WHETHER NATIVE OR TRANSPLANTED HEART: Primary | ICD-10-CM

## 2018-02-15 LAB
ATRIAL RATE: 69 BPM
CALCULATED P AXIS, ECG09: 31 DEGREES
CALCULATED R AXIS, ECG10: -11 DEGREES
CALCULATED T AXIS, ECG11: 29 DEGREES
DIAGNOSIS, 93000: NORMAL
P-R INTERVAL, ECG05: 136 MS
Q-T INTERVAL, ECG07: 386 MS
QRS DURATION, ECG06: 100 MS
QTC CALCULATION (BEZET), ECG08: 413 MS
VENTRICULAR RATE, ECG03: 69 BPM

## 2018-02-15 NOTE — TELEPHONE ENCOUNTER
Called patient. He saw I had scheduled him for 4 pm testing tomorrow. I explained I was trying to get the slot but it didn't work out. Patient agrees to keep 1:00 pm appointment with Dr. Faiza Munson tomorrow and stress test next Tuesday. Patient verbalizes understanding and denies further questions or concerns.

## 2018-02-15 NOTE — TELEPHONE ENCOUNTER
----- Message from Dagoberto Guardado MD sent at 2/15/2018  9:12 AM EST -----  Call him and see if can come in tomorrow morning and we can do a stress echo. Will need to hold atenolol  ----- Message -----     From: [place] MD     Sent: 2/14/2018   8:40 PM       To: Dagoberto Guardado MD, Nhan Hoskins,    Pt of yours went to ER. Chest pain atypical/sounds like reflux. Enzymes, EKG ok. Needs outpt f/u this week if you can call in AM (thursday).       Thanks, [place]

## 2018-02-15 NOTE — ED PROVIDER NOTES
HPI Comments: 75 y.o. male with past medical history significant for hyperlipidemia, erectile dysfunction, CAD, HTN, carotid artery plaque, cataract, benign bladder papilloma, keratitis, diverticulosis, and impaired glucose tolerance who presents from home with chief complaint of chest pain. Patient states that he has been having an intermittent, non-pleuritic \"burning sensation\" in the center of his chest since last night. He states that the pain feels similar to the pain he experienced before his CABG in 2004. Patient states that he took Tums without relief. He denies having any alleviating or aggravating factors. Patient denies having any SOB, nausea, diaphoresis, fever, chills, leg swelling, or decreased appetite. There are no other acute medical concerns at this time. Social hx: former smoker, occasional EtOH use, no drug use  PCP: Vernell Turner MD  Cardiology: Berkley Chacon MD    Note written by John Villalobos, as dictated by Leslie Brasher. Dmitri Hamilton MD 8:40 PM      The history is provided by the patient. No  was used.         Past Medical History:   Diagnosis Date    Benign bladder papilloma     CAD (coronary artery disease) 10/7/2009    s/p CABG x 5, radial artery and internal mammary, nl stress test 3/08    Carotid artery plaque     mild on life line screening 6/11    Cataract     Diverticulosis     ED (erectile dysfunction) 10/7/2009    Hyperlipidemia LDL goal < 70 10/7/2009    Hypertension 10/7/2009    IGT (impaired glucose tolerance)     Keratitis     Low back pain 10/7/2009       Past Surgical History:   Procedure Laterality Date    CARDIAC SURG PROCEDURE UNLIST      CABG x 5 vessels    COLONOSCOPY N/A 7/19/2016    COLONOSCOPY performed by Salomon Ackerman MD at [facility]. [address], COLON, DIAGNOSTIC      no polyps 2006    HX COLONOSCOPY      HX CORONARY ARTERY BYPASS GRAFT      x 5 arteries     HX OTHER SURGICAL      pilonidal cyst removed    HX TONSILLECTOMY           Family History:   Problem Relation Age of Onset   24 [facility] Arthritis-osteo Mother     Hypertension Mother     Stroke Mother     High Cholesterol Mother     Cataract Mother     Other Mother      duodenal ulcer    Heart Disease Father     Cataract Father     Thyroid Disease Sister     High Cholesterol Sister     Cataract Sister        Social History     Social History    Marital status:      Spouse name: N/A    Number of children: N/A    Years of education: N/A     Occupational History    Not on file. Social History Main Topics    Smoking status: Former Smoker     Quit date: 3/31/1980    Smokeless tobacco: Never Used    Alcohol use Yes      Comment: occasional    Drug use: No    Sexual activity: Yes     Partners: Female     Other Topics Concern    Not on file     Social History Narrative         ALLERGIES: Review of patient's allergies indicates no known allergies. Review of Systems   Constitutional: Negative for chills and fever. HENT: Negative for ear pain and sore throat. Eyes: Negative for pain. Respiratory: Negative for chest tightness and shortness of breath. Cardiovascular: Positive for chest pain. Negative for leg swelling. Gastrointestinal: Negative for abdominal pain, nausea and vomiting. Genitourinary: Negative for dysuria and flank pain. Musculoskeletal: Negative for back pain. Skin: Negative for rash. Neurological: Negative for headaches. All other systems reviewed and are negative. Vitals:    02/14/18 1700   BP: (!) 160/93   Pulse: 71   Resp: 16   Temp: 98.2 °F (36.8 °C)   SpO2: 94%   Weight: 77.1 kg (170 lb)   Height: 5' 9\" (1.753 m)            Physical Exam   Constitutional: He appears well-developed and well-nourished. No distress. HENT:   Head: Normocephalic and atraumatic. Eyes: Pupils are equal, round, and reactive to light. No scleral icterus. Neck: Normal range of motion. Neck supple.  No tracheal deviation present. Cardiovascular: Normal rate, regular rhythm, normal heart sounds and intact distal pulses. Exam reveals no gallop and no friction rub. No murmur heard. Pulmonary/Chest: Effort normal and breath sounds normal. No respiratory distress. He has no wheezes. He has no rales. Abdominal: Soft. He exhibits no distension. There is no tenderness. There is no rebound and no guarding. Musculoskeletal: He exhibits no edema. Neurological: He is alert. Skin: Skin is warm and dry. Psychiatric: He has a normal mood and affect. Nursing note and vitals reviewed. Note written by John Silva, as dictated by Ivy Ferreira. Alie Umanzor MD 8:40 PM    MDM  Number of Diagnoses or Management Options  Chest pain, unspecified type:   Diagnosis management comments: Diff dx: ACS, GERD        ED Course       Procedures  CONSULT NOTE:  8:46 PM Abisai Umanzor MD spoke with Dr. Harriet Viveros, Consult for Cardiology. Discussed available diagnostic tests and clinical findings. He is in agreement with care plans as outlined. Dr. Pramod Reid will see the patient. LABORATORY TESTS:  Labs Reviewed   METABOLIC PANEL, COMPREHENSIVE - Abnormal; Notable for the following:        Result Value    A-G Ratio 1.0 (*)     All other components within normal limits   CBC WITH AUTOMATED DIFF   TROPONIN I   CK W/ REFLX CKMB   TROPONIN I   SAMPLES BEING HELD       IMAGING RESULTS:  Xr Chest Pa Lat    Result Date: 2/14/2018  INDICATION: chest pain EXAM: CXR 2 Views. COMPARISON: 1/5/2017. FINDINGS: Frontal and lateral views of the chest show the lungs are free of acute disease. Heart size is normal. There is prior CABG. There is no overt pulmonary edema. There is no evident pneumothorax, adenopathy or pleural effusion. IMPRESSION: No acute disease. No significant interval change. MEDICATIONS GIVEN:  Medications - No data to display    IMPRESSION:  1.  Chest pain, unspecified type        PLAN:  -   Discharge Medication List as of 2/14/2018  9:18 PM      START taking these medications    Details   omeprazole (PRILOSEC) 10 mg capsule Take 1 Cap by mouth daily for 20 days. , Print, Disp-20 Cap, R-0         CONTINUE these medications which have NOT CHANGED    Details   atorvastatin (LIPITOR) 80 mg tablet Take 1 Tab by mouth daily. , Normal, Disp-90 Tab, R-3      metoprolol succinate (TOPROL-XL) 50 mg XL tablet Take 1 Tab by mouth daily. , Normal, Disp-90 Tab, R-3      benzonatate (TESSALON) 200 mg capsule Take 1 Cap by mouth three (3) times daily as needed for Cough., Normal, Disp-30 Cap, R-1      guaiFENesin-codeine (ROBITUSSIN AC) 100-10 mg/5 mL solution Take 5 mL by mouth three (3) times daily as needed for Cough. Max Daily Amount: 15 mL. , Print, Disp-120 mL, R-0      VITAMIN B COMPLEX (B COMPLEX PO) Take  by mouth. Takes one po once daily. , Historical Med      MULTIVITAMIN PO Take 1 Tab by mouth daily. , Historical Med      FOLIC ACID PO Take 5 mg by mouth daily. , Historical Med      tadalafil (CIALIS) 20 mg tablet Take 20 mg by mouth as needed., Normal, Disp-12 Tab, R-11      coenzyme Q-10 (CO Q-10) 200 mg capsule Take 1 Cap by mouth daily. Normal, 200 mg, Disp-30 Cap, R-11      aspirin (ASPIRIN) 325 mg tablet Take 325 mg by mouth daily.   Historical Med, 325 mg           -   Follow-up Information     Follow up With Details Comments Contact Info    Janell Martinez MD Schedule an appointment as soon as possible for a visit  [place] [address]  [address], [facility] DEP  If symptoms worsen [address] [facility]  [phone]          Disposition:  home, see patient instructions for treatment and plan    Condition:  stable    Total critical care time spent exclusive of procedures:  0 minutes    Maren Ventura MD

## 2018-02-15 NOTE — DISCHARGE INSTRUCTIONS
Chest Pain: Care Instructions  Your Care Instructions    There are many things that can cause chest pain. Some are not serious and will get better on their own in a few days. But some kinds of chest pain need more testing and treatment. Your doctor may have recommended a follow-up visit in the next 8 to 12 hours. If you are not getting better, you may need more tests or treatment. Even though your doctor has released you, you still need to watch for any problems. The doctor carefully checked you, but sometimes problems can develop later. If you have new symptoms or if your symptoms do not get better, get medical care right away. If you have worse or different chest pain or pressure that lasts more than 5 minutes or you passed out (lost consciousness), call 911 or seek other emergency help right away. A medical visit is only one step in your treatment. Even if you feel better, you still need to do what your doctor recommends, such as going to all suggested follow-up appointments and taking medicines exactly as directed. This will help you recover and help prevent future problems. How can you care for yourself at home? · Rest until you feel better. · Take your medicine exactly as prescribed. Call your doctor if you think you are having a problem with your medicine. · Do not drive after taking a prescription pain medicine. When should you call for help? Call 911 if:  ? · You passed out (lost consciousness). ? · You have severe difficulty breathing. ? · You have symptoms of a heart attack. These may include:  ¨ Chest pain or pressure, or a strange feeling in your chest.  ¨ Sweating. ¨ Shortness of breath. ¨ Nausea or vomiting. ¨ Pain, pressure, or a strange feeling in your back, neck, jaw, or upper belly or in one or both shoulders or arms. ¨ Lightheadedness or sudden weakness. ¨ A fast or irregular heartbeat.   After you call 911, the  may tell you to chew 1 adult-strength or 2 to 4 low-dose aspirin. Wait for an ambulance. Do not try to drive yourself. ?Call your doctor today if:  ? · You have any trouble breathing. ? · Your chest pain gets worse. ? · You are dizzy or lightheaded, or you feel like you may faint. ? · You are not getting better as expected. ? · You are having new or different chest pain. Where can you learn more? Go to http://jose l-dulce maria.info/. Enter A120 in the search box to learn more about \"Chest Pain: Care Instructions. \"  Current as of: March 20, 2017  Content Version: 11.4  © 1768-5304 [employer]. Care instructions adapted under license by [employer] (which disclaims liability or warranty for this information). If you have questions about a medical condition or this instruction, always ask your healthcare professional. [address] any warranty or liability for your use of this information.

## 2018-02-16 ENCOUNTER — OFFICE VISIT (OUTPATIENT)
Dept: CARDIOLOGY CLINIC | Age: 68
End: 2018-02-16

## 2018-02-16 VITALS
SYSTOLIC BLOOD PRESSURE: 122 MMHG | WEIGHT: 177.4 LBS | DIASTOLIC BLOOD PRESSURE: 82 MMHG | HEART RATE: 70 BPM | BODY MASS INDEX: 26.2 KG/M2

## 2018-02-16 DIAGNOSIS — I25.10 CORONARY ARTERY DISEASE INVOLVING NATIVE CORONARY ARTERY OF NATIVE HEART WITHOUT ANGINA PECTORIS: Primary | ICD-10-CM

## 2018-02-16 DIAGNOSIS — E78.5 HYPERLIPIDEMIA WITH TARGET LDL LESS THAN 70: ICD-10-CM

## 2018-02-16 DIAGNOSIS — R07.2 PRECORDIAL PAIN: ICD-10-CM

## 2018-02-16 DIAGNOSIS — I10 ESSENTIAL HYPERTENSION: ICD-10-CM

## 2018-02-16 NOTE — MR AVS SNAPSHOT
[address] [facility] [address] [place] 
[phone] Patient: Brandon Arevalo MRN: [id number] OOA:[birth year] Visit Information Date & Time Provider Department Dept. Phone Encounter #  
 2/16/2018  1:00 PM Aneta Barrett MD [facility] [phone] [id number] Follow-up Instructions Return in about 6 months (around 8/16/2018). Your Appointments 2/20/2018  3:00 PM  
ECHO CARDIOGRAMS 2D with ECHO, [facility]ING[facility] (PALMER SCHEDULING) Appt Note: stress echo per Dr. Fernando Haro dx CP then f/u with Dr. Fernando Haro [address] [place]  
[address] [facility]  
  
    
 2/20/2018  3:00 PM  
STRESS ECHOCARDIOGRAMS with Parminder Ferguson [facility] (PALMER SCHEDULING) Appt Note: stress echo per Dr. Fernando wheeler CP then f/u with Dr. Fernando Hirsch [address] [place]  
[address] [facility]  
  
    
 2/20/2018  3:40 PM  
ESTABLISHED PATIENT with Aneta Barrett MD  
[facility] ([facility]) Appt Note: stress echo per Dr. Fernando Haro then f/u  
 [address] [place]  
[address] [facility]  
  
    
 4/20/2018  8:40 AM  
ESTABLISHED PATIENT with Aneta Barrett MD  
[facility] ([facility]) Appt Note: one year follow up; Pt r/s from 04/13/18 one year follow up [address] [place]  
[phone] Upcoming Health Maintenance Date Due DTaP/Tdap/Td series (2 - Td) 9/1/2016 MEDICARE YEARLY EXAM 11/9/2018 GLAUCOMA SCREENING Q2Y 12/5/2018 COLONOSCOPY 7/19/2026 Allergies as of 2/16/2018  Review Complete On: 2/16/2018 By: Yas De Leon MD  
 No Known Allergies Current Immunizations  Reviewed on 11/8/2017 Name Date Influenza High Dose Vaccine PF 10/19/2017 Influenza Vaccine 11/7/2014 Influenza Vaccine (Quad) PF 11/3/2016, 10/22/2015, 10/24/2013 Influenza Vaccine Split 10/9/2012, 10/12/2011, 10/11/2010, 10/13/2009 Pneumococcal Conjugate (PCV-13) 4/2/2015 Pneumococcal Polysaccharide (PPSV-23) 5/12/2016 TDAP Vaccine 9/1/2006 Zoster Vaccine, Live 9/4/2013 Not reviewed this visit You Were Diagnosed With   
  
 Codes Comments Coronary artery disease involving native coronary artery of native heart without angina pectoris    -  Primary ICD-10-CM: I25.10 ICD-9-CM: 414.01 Essential hypertension     ICD-10-CM: I10 
ICD-9-CM: 401.9 Hyperlipidemia with target LDL less than 70     ICD-10-CM: E78.5 ICD-9-CM: 272.4 Precordial pain     ICD-10-CM: R07.2 ICD-9-CM: 786.51 Vitals BP Pulse Weight(growth percentile) BMI Smoking Status 122/82 (BP 1 Location: Left arm, BP Patient Position: Sitting) 70 177 lb 6.4 oz (80.5 kg) 26.2 kg/m2 Former Smoker Vitals History BMI and BSA Data Body Mass Index Body Surface Area  
 26.2 kg/m 2 1.98 m 2 Preferred Pharmacy Pharmacy Name Phone [address] [phone] Your Updated Medication List  
  
   
This list is accurate as of: 2/16/18  1:17 PM.  Always use your most recent med list.  
  
  
  
  
 aspirin 325 mg tablet Commonly known as:  ASPIRIN Take 325 mg by mouth daily. atorvastatin 80 mg tablet Commonly known as:  LIPITOR Take 1 Tab by mouth daily. B COMPLEX PO Take  by mouth. Takes one po once daily. benzonatate 200 mg capsule Commonly known as:  TESSALON Take 1 Cap by mouth three (3) times daily as needed for Cough. coenzyme Q-10 200 mg capsule Commonly known as:  CO Q-10 Take 1 Cap by mouth daily. FOLIC ACID PO Take 5 mg by mouth daily. guaiFENesin-codeine 100-10 mg/5 mL solution Commonly known as:  ROBITUSSIN AC Take 5 mL by mouth three (3) times daily as needed for Cough. Max Daily Amount: 15 mL. metoprolol succinate 50 mg XL tablet Commonly known as:  TOPROL-XL Take 1 Tab by mouth daily. MULTIVITAMIN PO Take 1 Tab by mouth daily. omeprazole 10 mg capsule Commonly known as:  PriLOSEC Take 1 Cap by mouth daily for 20 days. tadalafil 20 mg tablet Commonly known as:  CIALIS Take 20 mg by mouth as needed. Follow-up Instructions Return in about 6 months (around 8/16/2018). Introducing [address]! Dear Tracey Singh: Thank you for requesting a [employer] account. Our records indicate that you already have an active [employer] account. You can access your account anytime at [email or web address] Did you know that you can access your hospital and ER discharge instructions at any time in [employer]? You can also review all of your test results from your hospital stay or ER visit. Additional Information If you have questions, please visit the Frequently Asked Questions section of the [employer] website at [email or web address] Remember, We[employer]In is NOT to be used for urgent needs. For medical emergencies, dial 911. Now available from your iPhone and Android! Please provide this summary of care documentation to your next provider. Your primary care clinician is listed as [address] If you have any questions after today's visit, please call [phone].

## 2018-02-16 NOTE — PROGRESS NOTES
HISTORY OF PRESENT ILLNESS  Raul Wood is a 75 y.o. male     SUMMARY:   Problem List  Date Reviewed: 2/16/2018          Codes Class Noted    IGT (impaired glucose tolerance) ICD-10-CM: R73.02  ICD-9-CM: 790.22  Unknown        Advanced directives, counseling/discussion ICD-10-CM: Z71.89  ICD-9-CM: V65.49  11/21/2016    Overview Signed 11/21/2016  9:43 AM by Mary Tai MD     On file             Keratitis ICD-10-CM: H16.9  ICD-9-CM: 370.9  Unknown        Carotid artery disease without cerebral infarction ([address] ICD-10-CM: I77.9  ICD-9-CM: 447.9  4/9/2015        Benign bladder papilloma ICD-10-CM: D30.3  ICD-9-CM: 223.3  Unknown        Cataract ICD-10-CM: H26.9  ICD-9-CM: 366.9  Unknown        Hypertension ICD-10-CM: I10  ICD-9-CM: 401.9  10/7/2009        Hyperlipidemia with target LDL less than 70 ICD-10-CM: E78.5  ICD-9-CM: 272.4  10/7/2009        CAD (coronary artery disease) ICD-10-CM: I25.10  ICD-9-CM: 414.00  10/7/2009    Overview Addendum 3/31/2011  8:24 AM by Venkatesh Moreira MD     Mr. Lock's cardiac history dates back to September 2004 when he presented with progressive angina, had an abnormal stress test and then cardiac catheterization, which showed significant left main and three-vessel coronary artery disease. He then underwent five-vessel bypass using LIMA and a free radial artery. LV function was normal.             ED (erectile dysfunction) ICD-10-CM: N52.9  ICD-9-CM: 607.84  10/7/2009        Low back pain ICD-10-CM: M54.5  ICD-9-CM: 724.2  10/7/2009              Current Outpatient Prescriptions on File Prior to Visit   Medication Sig    omeprazole (PRILOSEC) 10 mg capsule Take 1 Cap by mouth daily for 20 days. (Patient taking differently: Take 20 mg by mouth daily.)    atorvastatin (LIPITOR) 80 mg tablet Take 1 Tab by mouth daily.  metoprolol succinate (TOPROL-XL) 50 mg XL tablet Take 1 Tab by mouth daily.     benzonatate (TESSALON) 200 mg capsule Take 1 Cap by mouth three (3) times daily as needed for Cough.  VITAMIN B COMPLEX (B COMPLEX PO) Take  by mouth. Takes one po once daily.  MULTIVITAMIN PO Take 1 Tab by mouth daily.  FOLIC ACID PO Take 5 mg by mouth daily.  tadalafil (CIALIS) 20 mg tablet Take 20 mg by mouth as needed.  coenzyme Q-10 (CO Q-10) 200 mg capsule Take 1 Cap by mouth daily.  aspirin (ASPIRIN) 325 mg tablet Take 325 mg by mouth daily.  guaiFENesin-codeine (ROBITUSSIN AC) 100-10 mg/5 mL solution Take 5 mL by mouth three (3) times daily as needed for Cough. Max Daily Amount: 15 mL. No current facility-administered medications on file prior to visit. CARDIOLOGY STUDIES TO DATE:  4/11 normal stress echo  6/11 life line screen , mild bilateral carotid disease  4/13 carotid dopplers 10-49% right and 0-9% left stenoses  4/15 carotid dopplers 10-49% right and 0-9% left stenoses     Chief Complaint   Patient presents with    Coronary Artery Disease     HPI :  A few days ago, Mr. Lock developed some burning epigastric pain, which worried him a little bit because it in ways reminded him of what he had around the time of his bypass operation. It was not associated with any other symptoms and activity made it no better or worse. He tried some Tums and that did not work and so after a few hours he went to the emergency room where he had a negative EKG and negative enzymes. He was started on Prilosec and things have gotten a little bit better. Yesterday, he went on a walk with his wife and when they finished he did not feel any burning discomfort at all.         CARDIAC ROS:   negative for dyspnea, palpitations, syncope, orthopnea, paroxysmal nocturnal dyspnea, exertional chest pressure/discomfort, claudication, lower extremity edema    Family History   Problem Relation Age of Onset    Arthritis-osteo Mother     Hypertension Mother     Stroke Mother     High Cholesterol Mother     Cataract Mother     Other Mother duodenal ulcer    Heart Disease Father     Cataract Father     Thyroid Disease Sister     High Cholesterol Sister    Miguel Angel Cardenas Cataract Sister        Past Medical History:   Diagnosis Date    Benign bladder papilloma     CAD (coronary artery disease) 10/7/2009    s/p CABG x 5, radial artery and internal mammary, nl stress test 3/08    Carotid artery plaque     mild on life line screening 6/11    Cataract     Diverticulosis     ED (erectile dysfunction) 10/7/2009    Hyperlipidemia LDL goal < 70 10/7/2009    Hypertension 10/7/2009    IGT (impaired glucose tolerance)     Keratitis     Low back pain 10/7/2009       GENERAL ROS:  A comprehensive review of systems was negative except for that written in the HPI.     Visit Vitals    /82 (BP 1 Location: Left arm, BP Patient Position: Sitting)    Pulse 70    Wt 177 lb 6.4 oz (80.5 kg)    BMI 26.2 kg/m2       Wt Readings from Last 3 Encounters:   02/16/18 177 lb 6.4 oz (80.5 kg)   02/14/18 170 lb (77.1 kg)   11/08/17 178 lb 3.2 oz (80.8 kg)            BP Readings from Last 3 Encounters:   02/16/18 122/82   02/14/18 (!) 160/93   11/08/17 137/78       PHYSICAL EXAM  General appearance: alert, cooperative, no distress, appears stated age  Neck: supple, symmetrical, trachea midline, no adenopathy, no carotid bruit and no JVD  Lungs: clear to auscultation bilaterally  Heart: regular rate and rhythm, S1, S2 normal, no murmur, click, rub or gallop  Extremities: extremities normal, atraumatic, no cyanosis or edema    Lab Results   Component Value Date/Time    Cholesterol, total 159 11/08/2017 10:20 AM    Cholesterol, total 153 05/31/2017 09:23 AM    Cholesterol, total 143 11/21/2016 09:59 AM    Cholesterol, total 131 04/20/2016 10:12 AM    Cholesterol, total 160 11/05/2015 11:38 AM    HDL Cholesterol 55 11/08/2017 10:20 AM    HDL Cholesterol 58 05/31/2017 09:23 AM    HDL Cholesterol 61 11/21/2016 09:59 AM    HDL Cholesterol 61 04/20/2016 10:12 AM    HDL Cholesterol 62 11/05/2015 11:38 AM    LDL, calculated 84 11/08/2017 10:20 AM    LDL, calculated 78 05/31/2017 09:23 AM    LDL, calculated 66 11/21/2016 09:59 AM    LDL, calculated 57 04/20/2016 10:12 AM    LDL, calculated 78 11/05/2015 11:38 AM    Triglyceride 102 11/08/2017 10:20 AM    Triglyceride 87 05/31/2017 09:23 AM    Triglyceride 82 11/21/2016 09:59 AM    Triglyceride 64 04/20/2016 10:12 AM    Triglyceride 98 11/05/2015 11:38 AM    CHOL/HDL Ratio 2.3 05/19/2010 10:52 AM    CHOL/HDL Ratio 2.5 11/09/2009 11:49 AM    CHOL/HDL Ratio 2.8 01/23/2009 09:41 AM     ASSESSMENT  Mr. Lock's symptoms are atypical and probably are GI. That being said, he has not had any stress testing since 2011, so we are going to set him up for a stress echocardiogram and he will hold his Metoprolol on the day of the exam.        current treatment plan is effective, no change in therapy  lab results and schedule of future lab studies reviewed with patient  reviewed diet, exercise and weight control    Encounter Diagnoses   Name Primary?  Coronary artery disease involving native coronary artery of native heart without angina pectoris Yes    Essential hypertension     Hyperlipidemia with target LDL less than 70     Precordial pain      No orders of the defined types were placed in this encounter. Follow-up Disposition:  Return in about 6 months (around 8/16/2018).     King Oquendo MD  2/16/2018

## 2018-02-20 ENCOUNTER — CLINICAL SUPPORT (OUTPATIENT)
Dept: CARDIOLOGY CLINIC | Age: 68
End: 2018-02-20

## 2018-02-20 DIAGNOSIS — I25.10 CORONARY ARTERY DISEASE INVOLVING NATIVE CORONARY ARTERY, ANGINA PRESENCE UNSPECIFIED, UNSPECIFIED WHETHER NATIVE OR TRANSPLANTED HEART: ICD-10-CM

## 2018-02-26 ENCOUNTER — HOSPITAL ENCOUNTER (OUTPATIENT)
Dept: LAB | Age: 68
Discharge: HOME | End: 2018-02-26
Payer: MEDICARE

## 2018-02-26 ENCOUNTER — OFFICE VISIT (OUTPATIENT)
Dept: INTERNAL MEDICINE CLINIC | Age: 68
End: 2018-02-26

## 2018-02-26 VITALS
OXYGEN SATURATION: 96 % | SYSTOLIC BLOOD PRESSURE: 125 MMHG | HEART RATE: 86 BPM | WEIGHT: 179.2 LBS | BODY MASS INDEX: 26.54 KG/M2 | TEMPERATURE: 98.3 F | DIASTOLIC BLOOD PRESSURE: 82 MMHG | HEIGHT: 69 IN | RESPIRATION RATE: 16 BRPM

## 2018-02-26 DIAGNOSIS — D41.4: ICD-10-CM

## 2018-02-26 DIAGNOSIS — I10 ESSENTIAL HYPERTENSION: ICD-10-CM

## 2018-02-26 DIAGNOSIS — K21.9 GASTROESOPHAGEAL REFLUX DISEASE, ESOPHAGITIS PRESENCE NOT SPECIFIED: ICD-10-CM

## 2018-02-26 DIAGNOSIS — I25.10 CORONARY ARTERY DISEASE INVOLVING NATIVE CORONARY ARTERY OF NATIVE HEART WITHOUT ANGINA PECTORIS: ICD-10-CM

## 2018-02-26 DIAGNOSIS — H60.392: Primary | ICD-10-CM

## 2018-02-26 DIAGNOSIS — E78.5 HYPERLIPIDEMIA WITH TARGET LDL LESS THAN 70: ICD-10-CM

## 2018-02-26 PROCEDURE — 87070 CULTURE OTHR SPECIMN AEROBIC: CPT

## 2018-02-26 RX ORDER — SULFAMETHOXAZOLE AND TRIMETHOPRIM 800; 160 MG/1; MG/1
1 TABLET ORAL 2 TIMES DAILY
Qty: 20 TAB | Refills: 0 | Status: SHIPPED | OUTPATIENT
Start: 2018-02-26 | End: 2018-03-08

## 2018-02-26 RX ORDER — MUPIROCIN 20 MG/G
OINTMENT TOPICAL 2 TIMES DAILY
Qty: 22 G | Refills: 0 | Status: SHIPPED | OUTPATIENT
Start: 2018-02-26 | End: 2019-04-04 | Stop reason: ALTCHOICE

## 2018-02-26 RX ORDER — PHENOL/SODIUM PHENOLATE
20 AEROSOL, SPRAY (ML) MUCOUS MEMBRANE DAILY
Qty: 30 TAB | Refills: 2 | Status: SHIPPED | OUTPATIENT
Start: 2018-02-26 | End: 2019-04-04 | Stop reason: ALTCHOICE

## 2018-02-26 NOTE — MR AVS SNAPSHOT
[address] [place] [address] 
[phone] Patient: Bobbi Amaya MRN: [id number] MQW:[id number] Visit Information Date & Time Provider Department Dept. Phone Encounter #  
 2/26/2018  4:30 PM Tristen Moore, [address] [place] [address] and Internal Medicine [phone] [id number] Follow-up Instructions Return in about 3 months (around 5/26/2018), or if symptoms worsen or fail to improve, for GERD . Upcoming Health Maintenance Date Due DTaP/Tdap/Td series (2 - Td) 9/1/2016 MEDICARE YEARLY EXAM 11/9/2018 GLAUCOMA SCREENING Q2Y 12/5/2018 COLONOSCOPY 7/19/2026 Allergies as of 2/26/2018  Review Complete On: 2/26/2018 By: Tristen Moore MD  
 No Known Allergies Current Immunizations  Reviewed on 11/8/2017 Name Date Influenza High Dose Vaccine PF 10/19/2017 Influenza Vaccine 11/7/2014 Influenza Vaccine (Quad) PF 11/3/2016, 10/22/2015, 10/24/2013 Influenza Vaccine Split 10/9/2012, 10/12/2011, 10/11/2010, 10/13/2009 Pneumococcal Conjugate (PCV-13) 4/2/2015 Pneumococcal Polysaccharide (PPSV-23) 5/12/2016 TDAP Vaccine 9/1/2006 Zoster Vaccine, Live 9/4/2013 Not reviewed this visit You Were Diagnosed With   
  
 Codes Comments Infection of ear canal, left    -  Primary ICD-10-CM: H35.585 ICD-9-CM: 380.10 Urinary bladder papilloma     ICD-10-CM: D41.4 ICD-9-CM: 236.7 Coronary artery disease involving native coronary artery of native heart without angina pectoris     ICD-10-CM: I25.10 ICD-9-CM: 414.01 Hyperlipidemia with target LDL less than 70     ICD-10-CM: E78.5 ICD-9-CM: 272.4 Essential hypertension     ICD-10-CM: I10 
ICD-9-CM: 401.9 Gastroesophageal reflux disease, esophagitis presence not specified     ICD-10-CM: K21.9 ICD-9-CM: 530.81 Vitals BP Pulse Temp Resp Height(growth percentile) Weight(growth percentile) 125/82 (BP 1 Location: Left arm, BP Patient Position: Sitting) 86 98.3 °F (36.8 °C) (Oral) 16 5' 9\" (1.753 m) 179 lb 3.2 oz (81.3 kg) SpO2 BMI Smoking Status 96% 26.46 kg/m2 Former Smoker Vitals History BMI and BSA Data Body Mass Index Body Surface Area  
 26.46 kg/m 2 1.99 m 2 Preferred Pharmacy Pharmacy Name Phone [facility]/PHARMACY #[id number] [place], [place] - [address] [facility] [phone] Your Updated Medication List  
  
   
This list is accurate as of 2/26/18  5:20 PM.  Always use your most recent med list.  
  
  
  
  
 aspirin 325 mg tablet Commonly known as:  ASPIRIN Take 325 mg by mouth daily. atorvastatin 80 mg tablet Commonly known as:  LIPITOR Take 1 Tab by mouth daily. B COMPLEX PO Take  by mouth. Takes one po once daily. benzonatate 200 mg capsule Commonly known as:  TESSALON Take 1 Cap by mouth three (3) times daily as needed for Cough. coenzyme Q-10 200 mg capsule Commonly known as:  CO Q-10 Take 1 Cap by mouth daily. FOLIC ACID PO Take 5 mg by mouth daily. guaiFENesin-codeine 100-10 mg/5 mL solution Commonly known as:  ROBITUSSIN AC Take 5 mL by mouth three (3) times daily as needed for Cough. Max Daily Amount: 15 mL. metoprolol succinate 50 mg XL tablet Commonly known as:  TOPROL-XL Take 1 Tab by mouth daily. MULTIVITAMIN PO Take 1 Tab by mouth daily. mupirocin 2 % ointment Commonly known as:  [facility] Apply  to affected area two (2) times a day. Omeprazole delayed release 20 mg tablet Commonly known as:  PRILOSEC D/R Take 1 Tab by mouth daily. tadalafil 20 mg tablet Commonly known as:  CIALIS Take 20 mg by mouth as needed. trimethoprim-sulfamethoxazole 160-800 mg per tablet Commonly known as:  BACTRIM DS, SEPTRA DS Take 1 Tab by mouth two (2) times a day for 10 days. Prescriptions Sent to Pharmacy Refills  
 mupirocin (BACTROBAN) 2 % ointment 0 Sig: Apply  to affected area two (2) times a day. Class: Normal  
 Pharmacy: [facility]/pharmacy #[id number] [address]  #: [phone] Route: Topical  
 trimethoprim-sulfamethoxazole (BACTRIM DS, SEPTRA DS) 160-800 mg per tablet 0 Sig: Take 1 Tab by mouth two (2) times a day for 10 days. Class: Normal  
 Pharmacy: [facility]/pharmacy #[id number] - [address] Ph #: [phone] Route: Oral  
 Omeprazole delayed release (PRILOSEC D/R) 20 mg tablet 2 Sig: Take 1 Tab by mouth daily. Class: Normal  
 Pharmacy: [facility]/pharmacy #[id number] [address]  #: [phone] Route: Oral  
  
We Performed the Following CULTURE, ANAEROBIC AND AEROBIC [id number] CPT(R)] Follow-up Instructions Return in about 3 months (around 5/26/2018), or if symptoms worsen or fail to improve, for GERD . Introducing [facility]! Dear Ivelisse De Paz: Thank you for requesting a [employer] account. Our records indicate that you already have an active [employer] account. You can access your account anytime at [email or web address] Did you know that you can access your hospital and ER discharge instructions at any time in [employer]? You can also review all of your test results from your hospital stay or ER visit. Additional Information If you have questions, please visit the Frequently Asked Questions section of the [employer] website at [email or web address] Remember, [employer] is NOT to be used for urgent needs. For medical emergencies, dial 911. Now available from your iPhone and Android! Please provide this summary of care documentation to your next provider. Your primary care clinician is listed as [address]  If you have any questions after today's visit, please call [phone].

## 2018-02-26 NOTE — PROGRESS NOTES
HPI:  Presents for acute care    Left ear pain x several days    Worse to tough it  Initially some fullness, muffled sounds    No fever    More prominent with eating     Seeing Urology - Dr. Kelin Jeffers  Recent low grade lesion resected from bladder  CT ordered to be sure no other urothelial lesions    Had chest pain   Neg stress test and ER eval  Rx'd low dose PPI - pt took OTC 20 mg prilosec      Past medical, Social, and Family history reviewed    Prior to Admission medications    Medication Sig Start Date End Date Taking? Authorizing Provider   omeprazole (PRILOSEC) 10 mg capsule Take 1 Cap by mouth daily for 20 days. Patient taking differently: Take 20 mg by mouth daily. 2/14/18 3/6/18 Yes Abisai De Luna MD   atorvastatin (LIPITOR) 80 mg tablet Take 1 Tab by mouth daily. 11/8/17  Yes Elizabet Alfonso MD   metoprolol succinate (TOPROL-XL) 50 mg XL tablet Take 1 Tab by mouth daily. 9/26/17  Yes Elizabet Alfonso MD   benzonatate (TESSALON) 200 mg capsule Take 1 Cap by mouth three (3) times daily as needed for Cough. 1/13/17  Yes Elizabet Alfonso MD   VITAMIN B COMPLEX (B COMPLEX PO) Take  by mouth. Takes one po once daily. Yes Historical Provider   MULTIVITAMIN PO Take 1 Tab by mouth daily. Yes Historical Provider   FOLIC ACID PO Take 5 mg by mouth daily. Yes Historical Provider   tadalafil (CIALIS) 20 mg tablet Take 20 mg by mouth as needed. 11/8/13  Yes Elizabet Alfonso MD   coenzyme Q-10 (CO Q-10) 200 mg capsule Take 1 Cap by mouth daily. 10/10/12  Yes Elizabet Alfonso MD   aspirin (ASPIRIN) 325 mg tablet Take 325 mg by mouth daily. Yes Historical Provider   guaiFENesin-codeine (ROBITUSSIN AC) 100-10 mg/5 mL solution Take 5 mL by mouth three (3) times daily as needed for Cough. Max Daily Amount: 15 mL. 1/5/17   MARLENE Mas          ROS  Complete ROS reviewed and negative or stable except as noted in HPI. Physical Exam   Constitutional: He is oriented to person, place, and time.  He appears well-nourished. No distress. HENT:   Head: Normocephalic and atraumatic. Left Ear: There is drainage (scant from papular lesion) and tenderness. Eyes: EOM are normal. Pupils are equal, round, and reactive to light. No scleral icterus. Neck: Normal range of motion. Neck supple. Cardiovascular: Normal rate. Pulmonary/Chest: Effort normal. No respiratory distress. Abdominal: Soft. He exhibits no distension. There is no tenderness. Musculoskeletal: Normal range of motion. He exhibits no edema. Neurological: He is alert and oriented to person, place, and time. He exhibits normal muscle tone. Skin: Skin is warm. No rash noted. Psychiatric: He has a normal mood and affect. Nursing note and vitals reviewed. Prior labs reviewed. Assessment/Plan:    ICD-10-CM ICD-9-CM    1. Infection of ear canal, left H60.392 380.10 mupirocin (BACTROBAN) 2 % ointment      trimethoprim-sulfamethoxazole (BACTRIM DS, SEPTRA DS) 160-800 mg per tablet      CULTURE, ANAEROBIC AND AEROBIC   2. Urinary bladder papilloma D41.4 236.7    3. Coronary artery disease involving native coronary artery of native heart without angina pectoris I25.10 414.01    4. Hyperlipidemia with target LDL less than 70 E78.5 272.4    5. Essential hypertension I10 401.9    6. Gastroesophageal reflux disease, esophagitis presence not specified K21.9 530.81 Omeprazole delayed release (PRILOSEC D/R) 20 mg tablet     Follow-up Disposition:  Return in about 3 months (around 5/26/2018), or if symptoms worsen or fail to improve, for GERD .   results and schedule of future studies reviewed with patient  reviewed diet, exercise and weight   cardiovascular risk and specific lipid/LDL goals reviewed  reviewed medications and side effects in detail   Ear pustule cx  Bactrim  Mupirocin  Follow with   PPI x 2 months

## 2018-02-26 NOTE — PROGRESS NOTES
Rm 13    Chief Complaint   Patient presents with    Ear Pain     left side, ongoing since thursday night per pt    Follow-up     ED visit 2/17/18     1. Have you been to the ER, urgent care clinic since your last visit? Hospitalized since your last visit? ED visit, 2/14 chest pain    2. Have you seen or consulted any other health care providers outside of the [address] [place] [address] since your last visit? Include any pap smears or colon screening. No    Health Maintenance Due   Topic Date Due    DTaP/Tdap/Td series (2 - Td) 09/01/2016     Fall Risk Assessment, last 12 mths 2/26/2018   Able to walk? Yes   Fall in past 12 months?  No         PHQ over the last two weeks 2/26/2018   Little interest or pleasure in doing things Not at all   Feeling down, depressed or hopeless Not at all   Total Score PHQ 2 0

## 2018-03-02 LAB
BACTERIA SPEC AEROBE CULT: NORMAL
BACTERIA SPEC ANAEROBE CULT: NORMAL

## 2018-03-07 NOTE — PROGRESS NOTES
Only routine skin dawit has grown. If the lesion or ear discomfort persist then let me know. If it is improving then complete the antibiotic as Rx'd.

## 2018-05-15 PROBLEM — E55.9 VITAMIN D DEFICIENCY: Status: ACTIVE | Noted: 2018-05-15

## 2018-05-16 ENCOUNTER — HOSPITAL ENCOUNTER (OUTPATIENT)
Dept: LAB | Age: 68
Discharge: HOME | End: 2018-05-16
Payer: MEDICARE

## 2018-05-16 PROCEDURE — 80061 LIPID PANEL: CPT

## 2018-05-16 PROCEDURE — 82306 VITAMIN D 25 HYDROXY: CPT

## 2018-05-16 PROCEDURE — 85025 COMPLETE CBC W/AUTO DIFF WBC: CPT

## 2018-05-16 PROCEDURE — 80053 COMPREHEN METABOLIC PANEL: CPT

## 2018-05-16 PROCEDURE — 83036 HEMOGLOBIN GLYCOSYLATED A1C: CPT

## 2018-05-16 PROCEDURE — 82550 ASSAY OF CK (CPK): CPT

## 2018-05-18 ENCOUNTER — OFFICE VISIT (OUTPATIENT)
Dept: INTERNAL MEDICINE CLINIC | Age: 68
End: 2018-05-18

## 2018-05-18 VITALS
HEART RATE: 57 BPM | RESPIRATION RATE: 16 BRPM | BODY MASS INDEX: 25.74 KG/M2 | OXYGEN SATURATION: 100 % | WEIGHT: 173.8 LBS | TEMPERATURE: 97.9 F | SYSTOLIC BLOOD PRESSURE: 136 MMHG | DIASTOLIC BLOOD PRESSURE: 84 MMHG | HEIGHT: 69 IN

## 2018-05-18 DIAGNOSIS — R73.02 IGT (IMPAIRED GLUCOSE TOLERANCE): ICD-10-CM

## 2018-05-18 DIAGNOSIS — I25.10 CORONARY ARTERY DISEASE INVOLVING NATIVE CORONARY ARTERY OF NATIVE HEART WITHOUT ANGINA PECTORIS: ICD-10-CM

## 2018-05-18 DIAGNOSIS — I10 ESSENTIAL HYPERTENSION: Primary | ICD-10-CM

## 2018-05-18 DIAGNOSIS — E55.9 VITAMIN D DEFICIENCY: ICD-10-CM

## 2018-05-18 DIAGNOSIS — E78.5 HYPERLIPIDEMIA WITH TARGET LDL LESS THAN 70: ICD-10-CM

## 2018-05-18 NOTE — MR AVS SNAPSHOT
[address] [place] [address] [address] [place] [address] 
[phone] Patient: Junito Velasco MRN: [id number] Visit Information Date & Time Provider Department Dept. Phone Encounter #  
 5/18/2018  1:45 PM Thor Patton, [address] [place] [address] and Internal Medicine [phone] [id number] Follow-up Instructions Return in about 6 months (around 11/18/2018), or if symptoms worsen or fail to improve, for blood pressure, cholesterol. Upcoming Health Maintenance Date Due DTaP/Tdap/Td series (2 - Td) 9/1/2016 Influenza Age 5 to Adult 8/1/2018 MEDICARE YEARLY EXAM 11/9/2018 GLAUCOMA SCREENING Q2Y 12/5/2018 COLONOSCOPY 7/19/2026 Allergies as of 5/18/2018  Review Complete On: 5/18/2018 By: Thor Patton MD  
 No Known Allergies Current Immunizations  Reviewed on 5/18/2018 Name Date Influenza High Dose Vaccine PF 10/19/2017 Influenza Vaccine 11/7/2014 Influenza Vaccine (Quad) PF 11/3/2016, 10/22/2015, 10/24/2013 Influenza Vaccine Split 10/9/2012, 10/12/2011, 10/11/2010, 10/13/2009 Pneumococcal Conjugate (PCV-13) 4/2/2015 Pneumococcal Polysaccharide (PPSV-23) 5/12/2016 TDAP Vaccine 9/1/2006 Zoster Vaccine, Live 9/4/2013 Reviewed by Thor Patton MD on 5/18/2018 at  2:49 PM  
You Were Diagnosed With   
  
 Codes Comments IGT (impaired glucose tolerance)    -  Primary ICD-10-CM: R73.02 
ICD-9-CM: 790.22 Hyperlipidemia with target LDL less than 70     ICD-10-CM: E78.5 ICD-9-CM: 272.4 Essential hypertension     ICD-10-CM: I10 
ICD-9-CM: 401.9 Coronary artery disease involving native coronary artery of native heart without angina pectoris     ICD-10-CM: I25.10 ICD-9-CM: 414.01 Vitamin D deficiency     ICD-10-CM: E55.9 ICD-9-CM: 268.9 Vitals BP Pulse Temp Resp Height(growth percentile) Weight(growth percentile) 136/84 (!) 57 97.9 °F (36.6 °C) (Oral) 16 5' 9\" (1.753 m) 173 lb 12.8 oz (78.8 kg) SpO2 BMI Smoking Status 100% 25.67 kg/m2 Former Smoker Vitals History BMI and BSA Data Body Mass Index Body Surface Area  
 25.67 kg/m 2 1.96 m 2 Preferred Pharmacy Pharmacy Name Phone [facility]/PHARMACY #[id number] [address] [address] [facility] [phone] Your Updated Medication List  
  
   
This list is accurate as of 5/18/18  2:57 PM.  Always use your most recent med list.  
  
  
  
  
 aspirin 325 mg tablet Commonly known as:  ASPIRIN Take 325 mg by mouth daily. atorvastatin 80 mg tablet Commonly known as:  LIPITOR Take 1 Tab by mouth daily. B COMPLEX PO Take  by mouth. Takes one po once daily. benzonatate 200 mg capsule Commonly known as:  TESSALON Take 1 Cap by mouth three (3) times daily as needed for Cough. coenzyme Q-10 200 mg capsule Commonly known as:  CO Q-10 Take 1 Cap by mouth daily. FOLIC ACID PO Take 5 mg by mouth daily. guaiFENesin-codeine 100-10 mg/5 mL solution Commonly known as:  ROBITUSSIN AC Take 5 mL by mouth three (3) times daily as needed for Cough. Max Daily Amount: 15 mL. metoprolol succinate 50 mg XL tablet Commonly known as:  TOPROL-XL Take 1 Tab by mouth daily. MULTIVITAMIN PO Take 1 Tab by mouth daily. mupirocin 2 % ointment Commonly known as:  [facility] Apply  to affected area two (2) times a day. Omeprazole delayed release 20 mg tablet Commonly known as:  PRILOSEC D/R Take 1 Tab by mouth daily. tadalafil 20 mg tablet Commonly known as:  CIALIS Take 20 mg by mouth as needed. Follow-up Instructions Return in about 6 months (around 11/18/2018), or if symptoms worsen or fail to improve, for blood pressure, cholesterol. Patient Instructions Ask pharmacy about the new shingles and Tdap vaccines . Introducing [facility] & HEALTH SERVICES! Dear Loki Stewart: Thank you for requesting a [employer] account. Our records indicate that you already have an active [employer] account. You can access your account anytime at [email or web address] Did you know that you can access your hospital and ER discharge instructions at any time in [employer]? You can also review all of your test results from your hospital stay or ER visit. Additional Information If you have questions, please visit the Frequently Asked Questions section of the [employer] website at [email or web address] Remember, [employer] is NOT to be used for urgent needs. For medical emergencies, dial 911. Now available from your iPhone and Android! Please provide this summary of care documentation to your next provider. Your primary care clinician is listed as [address] If you have any questions after today's visit, please call [phone].

## 2018-05-18 NOTE — PROGRESS NOTES
HPI:  Presents for f/u GERD, HTN, lipids    Took PPI x 2 months - sx resolved  Off med now a few weeks and no recurrence of sx yet    Reviewed labs    No CP, SOB, neuro sx    Ear lesion resolved. Past medical, Social, and Family history reviewed    Prior to Admission medications    Medication Sig Start Date End Date Taking? Authorizing Provider   mupirocin (BACTROBAN) 2 % ointment Apply  to affected area two (2) times a day. 2/26/18  Yes Savanah Easton MD   atorvastatin (LIPITOR) 80 mg tablet Take 1 Tab by mouth daily. 11/8/17  Yes Savanah Easton MD   metoprolol succinate (TOPROL-XL) 50 mg XL tablet Take 1 Tab by mouth daily. 9/26/17  Yes Savanah Easton MD   VITAMIN B COMPLEX (B COMPLEX PO) Take  by mouth. Takes one po once daily. Yes Historical Provider   MULTIVITAMIN PO Take 1 Tab by mouth daily. Yes Historical Provider   FOLIC ACID PO Take 5 mg by mouth daily. Yes Historical Provider   tadalafil (CIALIS) 20 mg tablet Take 20 mg by mouth as needed. 11/8/13  Yes Savanah Easton MD   coenzyme Q-10 (CO Q-10) 200 mg capsule Take 1 Cap by mouth daily. 10/10/12  Yes Savanah Easton MD   aspirin (ASPIRIN) 325 mg tablet Take 325 mg by mouth daily. Yes Historical Provider   Omeprazole delayed release (PRILOSEC D/R) 20 mg tablet Take 1 Tab by mouth daily. 2/26/18   Savanah Easton MD   benzonatate (TESSALON) 200 mg capsule Take 1 Cap by mouth three (3) times daily as needed for Cough. 1/13/17   Savanah Easton MD   guaiFENesin-codeine [facility]) 100-10 mg/5 mL solution Take 5 mL by mouth three (3) times daily as needed for Cough. Max Daily Amount: 15 mL. 1/5/17   MARLENE Colon          ROS  Complete ROS reviewed and negative or stable except as noted in HPI. Physical Exam   Constitutional: He is oriented to person, place, and time. He appears well-nourished. No distress. HENT:   Head: Normocephalic and atraumatic.    Mouth/Throat: Oropharynx is clear and moist. No oropharyngeal exudate. Eyes: EOM are normal. Pupils are equal, round, and reactive to light. No scleral icterus. Neck: Normal range of motion. Neck supple. No JVD present. No thyromegaly present. Cardiovascular: Normal rate, regular rhythm and normal heart sounds. Exam reveals no gallop and no friction rub. No murmur heard. Pulmonary/Chest: Effort normal and breath sounds normal. No respiratory distress. He has no wheezes. He has no rales. Abdominal: Soft. Bowel sounds are normal. He exhibits no distension. There is no tenderness. Genitourinary: Rectum normal and prostate normal.   Musculoskeletal: Normal range of motion. He exhibits no edema. Lymphadenopathy:     He has no cervical adenopathy. Neurological: He is alert and oriented to person, place, and time. He exhibits normal muscle tone. Coordination normal.   Skin: Skin is warm. No rash noted. Psychiatric: He has a normal mood and affect. Nursing note and vitals reviewed. Prior labs reviewed. Assessment/Plan:    ICD-10-CM ICD-9-CM    1. Essential hypertension I10 401.9    2. IGT (impaired glucose tolerance) R73.02 790.22    3. Hyperlipidemia with target LDL less than 70 E78.5 272.4    4. Coronary artery disease involving native coronary artery of native heart without angina pectoris I25.10 414.01    5. Vitamin D deficiency E55.9 268.9      Follow-up Disposition:  Return in about 6 months (around 11/18/2018), or if symptoms worsen or fail to improve, for blood pressure, cholesterol. results and schedule of future studies reviewed with patient  reviewed diet, exercise and weight  cardiovascular risk and specific lipid/LDL goals reviewed  reviewed medications and side effects in detail.   Continue current medications

## 2018-05-18 NOTE — PROGRESS NOTES
Rm 14    Chief Complaint   Patient presents with    Follow-up     GERD     1. Have you been to the ER, urgent care clinic since your last visit? Hospitalized since your last visit? No    2. Have you seen or consulted any other health care providers outside of the [facility] since your last visit? Include any pap smears or colon screening. No    Health Maintenance Due   Topic Date Due    DTaP/Tdap/Td series (2 - Td) 09/01/2016     Fall Risk Assessment, last 12 mths 2/26/2018   Able to walk? Yes   Fall in past 12 months?  No   no new falls      PHQ over the last two weeks 2/26/2018   Little interest or pleasure in doing things Not at all   Feeling down, depressed or hopeless Not at all   Total Score PHQ 2 0     Learning Assessment 11/8/2017   PRIMARY LEARNER Patient   HIGHEST LEVEL OF EDUCATION - PRIMARY LEARNER  > 4 YEARS OF COLLEGE   BARRIERS PRIMARY LEARNER NONE   CO-LEARNER CAREGIVER No   PRIMARY LANGUAGE ENGLISH   LEARNER PREFERENCE PRIMARY READING     VIDEOS     -   ANSWERED BY patient   RELATIONSHIP SELF

## 2018-09-28 DIAGNOSIS — I10 ESSENTIAL HYPERTENSION: ICD-10-CM

## 2018-09-28 DIAGNOSIS — I25.10 CORONARY ARTERY DISEASE INVOLVING NATIVE CORONARY ARTERY OF NATIVE HEART WITHOUT ANGINA PECTORIS: ICD-10-CM

## 2018-09-28 RX ORDER — METOPROLOL SUCCINATE 50 MG/1
TABLET, EXTENDED RELEASE ORAL
Qty: 90 TAB | Refills: 3 | Status: SHIPPED | OUTPATIENT
Start: 2018-09-28 | End: 2018-11-12 | Stop reason: ALTCHOICE

## 2018-10-01 DIAGNOSIS — I25.10 CORONARY ARTERY DISEASE INVOLVING NATIVE CORONARY ARTERY OF NATIVE HEART WITHOUT ANGINA PECTORIS: ICD-10-CM

## 2018-10-01 DIAGNOSIS — I10 ESSENTIAL HYPERTENSION: ICD-10-CM

## 2018-10-01 RX ORDER — ATENOLOL 50 MG/1
50 TABLET ORAL DAILY
Qty: 90 TAB | Refills: 3 | Status: SHIPPED | OUTPATIENT
Start: 2018-10-01 | End: 2019-10-01 | Stop reason: SDUPTHER

## 2018-10-05 ENCOUNTER — TELEPHONE (OUTPATIENT)
Dept: INTERNAL MEDICINE CLINIC | Age: 68
End: 2018-10-05

## 2018-10-05 NOTE — TELEPHONE ENCOUNTER
Spoke with Sharyn with [facility]. Informed him that pt is to take the Atenolol 50 mg tab. Understanding was verbalized.

## 2018-10-05 NOTE — TELEPHONE ENCOUNTER
LOV: 5/18/18  NOV w/ Dr Carrie Leigh: 11/12/18    Re: Patient email 10/1/18  [facility] wants to clarify which Rx pt should be taking:    Metoprolol 50 MG tab or Atenolol 50 MG Tab    [address] #: [phone]  Fax: [phone]

## 2018-10-17 ENCOUNTER — CLINICAL SUPPORT (OUTPATIENT)
Dept: INTERNAL MEDICINE CLINIC | Age: 68
End: 2018-10-17

## 2018-10-17 DIAGNOSIS — Z23 ENCOUNTER FOR IMMUNIZATION: Primary | ICD-10-CM

## 2018-10-17 NOTE — PROGRESS NOTES
Immunization administered to left deltoid 10/17/2018 by Roman Krueger LPN with patient's consent. Patient tolerated procedure well. No reactions noted. VIS provided to patient.

## 2018-11-05 DIAGNOSIS — E78.5 HYPERLIPIDEMIA WITH TARGET LDL LESS THAN 70: ICD-10-CM

## 2018-11-05 DIAGNOSIS — I25.10 CORONARY ARTERY DISEASE INVOLVING NATIVE CORONARY ARTERY OF NATIVE HEART WITHOUT ANGINA PECTORIS: ICD-10-CM

## 2018-11-05 DIAGNOSIS — I10 ESSENTIAL HYPERTENSION: ICD-10-CM

## 2018-11-05 DIAGNOSIS — E55.9 VITAMIN D DEFICIENCY: Primary | ICD-10-CM

## 2018-11-05 DIAGNOSIS — N40.0 BENIGN PROSTATIC HYPERPLASIA WITHOUT LOWER URINARY TRACT SYMPTOMS: ICD-10-CM

## 2018-11-05 DIAGNOSIS — R73.02 IGT (IMPAIRED GLUCOSE TOLERANCE): ICD-10-CM

## 2018-11-07 LAB
25(OH)D3+25(OH)D2 SERPL-MCNC: 30.6 NG/ML (ref 30–100)
ALBUMIN SERPL-MCNC: 4.2 G/DL (ref 3.6–4.8)
ALBUMIN/GLOB SERPL: 1.8 {RATIO} (ref 1.2–2.2)
ALP SERPL-CCNC: 73 IU/L (ref 39–117)
ALT SERPL-CCNC: 17 IU/L (ref 0–44)
AST SERPL-CCNC: 33 IU/L (ref 0–40)
BASOPHILS # BLD AUTO: 0 X10E3/UL (ref 0–0.2)
BASOPHILS NFR BLD AUTO: 0 %
BILIRUB SERPL-MCNC: 0.4 MG/DL (ref 0–1.2)
BUN SERPL-MCNC: 12 MG/DL (ref 8–27)
BUN/CREAT SERPL: 12 (ref 10–24)
CALCIUM SERPL-MCNC: 8.8 MG/DL (ref 8.6–10.2)
CHLORIDE SERPL-SCNC: 105 MMOL/L (ref 96–106)
CHOLEST SERPL-MCNC: 134 MG/DL (ref 100–199)
CK SERPL-CCNC: 326 U/L (ref 24–204)
CO2 SERPL-SCNC: 25 MMOL/L (ref 20–29)
CREAT SERPL-MCNC: 0.97 MG/DL (ref 0.76–1.27)
EOSINOPHIL # BLD AUTO: 0.2 X10E3/UL (ref 0–0.4)
EOSINOPHIL NFR BLD AUTO: 3 %
ERYTHROCYTE [DISTWIDTH] IN BLOOD BY AUTOMATED COUNT: 13.6 % (ref 12.3–15.4)
EST. AVERAGE GLUCOSE BLD GHB EST-MCNC: 120 MG/DL
GLOBULIN SER CALC-MCNC: 2.3 G/DL (ref 1.5–4.5)
GLUCOSE SERPL-MCNC: 85 MG/DL (ref 65–99)
HBA1C MFR BLD: 5.8 % (ref 4.8–5.6)
HCT VFR BLD AUTO: 46.1 % (ref 37.5–51)
HDLC SERPL-MCNC: 52 MG/DL
HGB BLD-MCNC: 15.2 G/DL (ref 13–17.7)
IMM GRANULOCYTES # BLD: 0 X10E3/UL (ref 0–0.1)
IMM GRANULOCYTES NFR BLD: 0 %
LDLC SERPL CALC-MCNC: 64 MG/DL (ref 0–99)
LYMPHOCYTES # BLD AUTO: 2 X10E3/UL (ref 0.7–3.1)
LYMPHOCYTES NFR BLD AUTO: 29 %
MCH RBC QN AUTO: 30.3 PG (ref 26.6–33)
MCHC RBC AUTO-ENTMCNC: 33 G/DL (ref 31.5–35.7)
MCV RBC AUTO: 92 FL (ref 79–97)
MONOCYTES # BLD AUTO: 0.9 X10E3/UL (ref 0.1–0.9)
MONOCYTES NFR BLD AUTO: 13 %
NEUTROPHILS # BLD AUTO: 3.8 X10E3/UL (ref 1.4–7)
NEUTROPHILS NFR BLD AUTO: 55 %
PLATELET # BLD AUTO: 182 X10E3/UL (ref 150–379)
POTASSIUM SERPL-SCNC: 4.5 MMOL/L (ref 3.5–5.2)
PROT SERPL-MCNC: 6.5 G/DL (ref 6–8.5)
PSA SERPL-MCNC: 0.6 NG/ML (ref 0–4)
RBC # BLD AUTO: 5.01 X10E6/UL (ref 4.14–5.8)
SODIUM SERPL-SCNC: 140 MMOL/L (ref 134–144)
TRIGL SERPL-MCNC: 90 MG/DL (ref 0–149)
VLDLC SERPL CALC-MCNC: 18 MG/DL (ref 5–40)
WBC # BLD AUTO: 6.8 X10E3/UL (ref 3.4–10.8)

## 2018-11-12 ENCOUNTER — OFFICE VISIT (OUTPATIENT)
Dept: INTERNAL MEDICINE CLINIC | Age: 68
End: 2018-11-12

## 2018-11-12 VITALS
DIASTOLIC BLOOD PRESSURE: 70 MMHG | RESPIRATION RATE: 16 BRPM | SYSTOLIC BLOOD PRESSURE: 128 MMHG | HEART RATE: 71 BPM | OXYGEN SATURATION: 94 % | BODY MASS INDEX: 25.67 KG/M2 | TEMPERATURE: 98.3 F | HEIGHT: 69 IN

## 2018-11-12 DIAGNOSIS — E78.5 HYPERLIPIDEMIA WITH TARGET LDL LESS THAN 70: ICD-10-CM

## 2018-11-12 DIAGNOSIS — I10 ESSENTIAL HYPERTENSION: ICD-10-CM

## 2018-11-12 DIAGNOSIS — M85.80 OSTEOPENIA DETERMINED BY X-RAY: ICD-10-CM

## 2018-11-12 DIAGNOSIS — I25.10 CORONARY ARTERY DISEASE INVOLVING NATIVE CORONARY ARTERY OF NATIVE HEART WITHOUT ANGINA PECTORIS: ICD-10-CM

## 2018-11-12 DIAGNOSIS — M54.50 LOW BACK PAIN WITHOUT SCIATICA, UNSPECIFIED BACK PAIN LATERALITY, UNSPECIFIED CHRONICITY: ICD-10-CM

## 2018-11-12 DIAGNOSIS — M89.9 DISORDER OF BONE: ICD-10-CM

## 2018-11-12 DIAGNOSIS — E55.9 VITAMIN D DEFICIENCY: ICD-10-CM

## 2018-11-12 DIAGNOSIS — R73.02 IGT (IMPAIRED GLUCOSE TOLERANCE): ICD-10-CM

## 2018-11-12 DIAGNOSIS — Z00.00 MEDICARE ANNUAL WELLNESS VISIT, SUBSEQUENT: Primary | ICD-10-CM

## 2018-11-12 NOTE — PROGRESS NOTES
Rm 15    Chief Complaint   Patient presents with   [facility] Annual Wellness Visit     1. Have you been to the ER, urgent care clinic since your last visit? Hospitalized since your last visit? No    2. Have you seen or consulted any other health care providers outside of the [address] [place] [address] since your last visit? Include any pap smears or colon screening. No    Health Maintenance Due   Topic Date Due    Shingrix Vaccine Age 49> (1 of 2) 09/05/1999    DTaP/Tdap/Td series (2 - Td) 09/01/2016    MEDICARE YEARLY EXAM  11/09/2018    GLAUCOMA SCREENING Q2Y  12/05/2018     Fall Risk Assessment, last 12 mths 11/12/2018   Able to walk? Yes   Fall in past 12 months? No     Abuse Screening Questionnaire 11/12/2018   Do you ever feel afraid of your partner? N   Are you in a relationship with someone who physically or mentally threatens you? N   Is it safe for you to go home?  Y     ADL Assessment 11/12/2018   Feeding yourself No Help Needed   Getting from bed to chair No Help Needed   Getting dressed No Help Needed   Bathing or showering No Help Needed   Walk across the room (includes cane/walker) No Help Needed   Using the telphone No Help Needed   Taking your medications No Help Needed   Preparing meals No Help Needed   Managing money (expenses/bills) No Help Needed   Moderately strenuous housework (laundry) No Help Needed   Shopping for personal items (toiletries/medicines) No Help Needed   Shopping for groceries No Help Needed   Driving No Help Needed   Climbing a flight of stairs No Help Needed   Getting to places beyond walking distances No Help Needed     PHQ over the last two weeks 11/12/2018   Little interest or pleasure in doing things Not at all   Feeling down, depressed, irritable, or hopeless Not at all   Total Score PHQ 2 0     Learning Assessment 11/12/2018   PRIMARY LEARNER Patient   HIGHEST LEVEL OF EDUCATION - PRIMARY LEARNER  > 4 YEARS OF COLLEGE   BARRIERS PRIMARY LEARNER NONE   CO-LEARNER CAREGIVER No PRIMARY LANGUAGE ENGLISH   LEARNER PREFERENCE PRIMARY VIDEOS     DEMONSTRATION     -   ANSWERED BY patient   RELATIONSHIP SELF

## 2018-11-12 NOTE — PROGRESS NOTES
HPI:  Presents for f/u lipids, etc    C/o back pain   Has seen chiropractor  Exercises and alignments have helped     Pt using Co Q10     No other statin intolerance    Intermittent PPI use to control sx    Urologist - cystoscopy 10/2018 - another scheduled in 4/2019    Past medical, Social, and Family history reviewed    Prior to Admission medications    Medication Sig Start Date End Date Taking? Authorizing Provider   atenolol (TENORMIN) 50 mg tablet Take 1 Tab by mouth daily. 10/1/18  Yes Shailesh Dejesus MD   mupirocin [facility]) 2 % ointment Apply  to affected area two (2) times a day. 2/26/18  Yes Shailesh Dejesus MD   Omeprazole delayed release (PRILOSEC D/R) 20 mg tablet Take 1 Tab by mouth daily. 2/26/18  Yes Shailesh Dejesus MD   atorvastatin (LIPITOR) 80 mg tablet Take 1 Tab by mouth daily. 11/8/17  Yes Shailesh Dejesus MD   benzonatate (TESSALON) 200 mg capsule Take 1 Cap by mouth three (3) times daily as needed for Cough. 1/13/17  Yes Shailesh Dejesus MD   guaiFENesin-codeine [facility]) 100-10 mg/5 mL solution Take 5 mL by mouth three (3) times daily as needed for Cough. Max Daily Amount: 15 mL. 1/5/17  Yes MARLENE Torres   VITAMIN B COMPLEX (B COMPLEX PO) Take  by mouth. Takes one po once daily. Yes Provider, Historical   MULTIVITAMIN PO Take 1 Tab by mouth daily. Yes Provider, Historical   FOLIC ACID PO Take 5 mg by mouth daily. Yes Provider, Historical   tadalafil (CIALIS) 20 mg tablet Take 20 mg by mouth as needed. 11/8/13  Yes Shailesh Dejesus MD   coenzyme Q-10 (CO Q-10) 200 mg capsule Take 1 Cap by mouth daily. 10/10/12  Yes Shailesh Dejesus MD   aspirin (ASPIRIN) 325 mg tablet Take 325 mg by mouth daily. Yes Provider, Historical   metoprolol succinate (TOPROL-XL) 50 mg XL tablet TAKE 1 TABLET EVERY DAY 9/28/18   Shailesh Dejesus MD          ROS  Complete ROS reviewed and negative or stable except as noted in HPI.       Physical Exam   Constitutional: He is oriented to person, place, and time. He appears well-nourished. No distress. HENT:   Head: Normocephalic and atraumatic. Mouth/Throat: Oropharynx is clear and moist. No oropharyngeal exudate. Eyes: EOM are normal. Pupils are equal, round, and reactive to light. No scleral icterus. Neck: Normal range of motion. Neck supple. No JVD present. No thyromegaly present. Cardiovascular: Normal rate, regular rhythm and normal heart sounds. Exam reveals no gallop and no friction rub. No murmur heard. Pulmonary/Chest: Effort normal and breath sounds normal. No respiratory distress. He has no wheezes. He has no rales. Abdominal: Soft. Bowel sounds are normal. He exhibits no distension. There is no tenderness. Genitourinary: Rectum normal and prostate normal.   Musculoskeletal: Normal range of motion. He exhibits no edema. Lymphadenopathy:     He has no cervical adenopathy. Neurological: He is alert and oriented to person, place, and time. He exhibits normal muscle tone. Coordination normal.   Skin: Skin is warm. No rash noted. Psychiatric: He has a normal mood and affect. Nursing note and vitals reviewed. Prior labs reviewed. Assessment/Plan:    ICD-10-CM ICD-9-CM    1. Hyperlipidemia with target LDL less than 70 E78.5 272.4    2. Osteopenia determined by x-ray M85.80 733.90 DEXA BONE DENSITY STUDY AXIAL   3. Vitamin D deficiency E55.9 268.9    4. IGT (impaired glucose tolerance) R73.02 790.22    5. Essential hypertension I10 401.9    6. Low back pain without sciatica, unspecified back pain laterality, unspecified chronicity M54.5 724.2    7. Coronary artery disease involving native coronary artery of native heart without angina pectoris I25.10 414.01    8. Disorder of bone  M89.9 733.90 DEXA BONE DENSITY STUDY AXIAL   9.  Medicare annual wellness visit, subsequent Z00.00 V70.0      Follow-up Disposition:  Return in about 1 year (around 11/12/2019), or if symptoms worsen or fail to improve, for Medicare Wellness Visit, cholesterol, blood pressure.   results and schedule of future studies reviewed with patient  reviewed diet, exercise and weight   cardiovascular risk and specific lipid/LDL goals reviewed  reviewed medications and side effects in detail   Consider crestor - pt to check on pricing  Continue co Q10   See specialists as scheduled

## 2018-11-12 NOTE — PROGRESS NOTES
This is the Subsequent Medicare Annual Wellness Exam, performed 12 months or more after the Initial AWV or the last Subsequent AWV    I have reviewed the patient's medical history in detail and updated the computerized patient record. 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 90 Tab 3    mupirocin (BACTROBAN) 2 % ointment Apply  to affected area two (2) times a day. 22 g 0    Omeprazole delayed release (PRILOSEC D/R) 20 mg tablet Take 1 Tab by mouth daily. 30 Tab 2    atorvastatin (LIPITOR) 80 mg tablet Take 1 Tab by mouth daily. 90 Tab 3    benzonatate (TESSALON) 200 mg capsule Take 1 Cap by mouth three (3) times daily as needed for Cough. 30 Cap 1    guaiFENesin-codeine (ROBITUSSIN AC) 100-10 mg/5 mL solution Take 5 mL by mouth three (3) times daily as needed for Cough. Max Daily Amount: 15 mL. 120 mL 0    VITAMIN B COMPLEX (B COMPLEX PO) Take  by mouth. Takes one po once daily.       MULTIVITAMIN PO Take 1 Tab by mouth daily.  FOLIC ACID PO Take 5 mg by mouth daily.  tadalafil (CIALIS) 20 mg tablet Take 20 mg by mouth as needed. 12 Tab 11    coenzyme Q-10 (CO Q-10) 200 mg capsule Take 1 Cap by mouth daily. 30 Cap 11    aspirin (ASPIRIN) 325 mg tablet Take 325 mg by mouth daily.  metoprolol succinate (TOPROL-XL) 50 mg XL tablet TAKE 1 TABLET EVERY DAY 90 Tab 3     No Known Allergies  Family History   Problem Relation Age of Onset    Arthritis-osteo Mother     Hypertension Mother     Stroke Mother     High Cholesterol Mother     Cataract Mother     Other Mother         duodenal ulcer    Heart Disease Father     Cataract Father     Thyroid Disease Sister     High Cholesterol Sister     Cataract Sister      Social History     Tobacco Use    Smoking status: Former Smoker     Last attempt to quit: 3/31/1980     Years since quittin.6    Smokeless tobacco: Never Used   Substance Use Topics    Alcohol use: Yes     Comment: occasional     Patient Active Problem List   Diagnosis Code    Hypertension I10    Hyperlipidemia with target LDL less than 70 E78.5    CAD (coronary artery disease) I25.10    ED (erectile dysfunction) N52.9    Low back pain M54.5    Cataract H26.9    Benign bladder papilloma D30.3    Carotid artery disease without cerebral infarction (HCC) I77.9    Keratitis H16.9    Advanced directives, counseling/discussion Z71.89    IGT (impaired glucose tolerance) R73.02    Urinary bladder papilloma D41.4    Vitamin D deficiency E55.9       Depression Risk Factor Screening:     PHQ over the last two weeks 2018   Little interest or pleasure in doing things Not at all   Feeling down, depressed, irritable, or hopeless Not at all   Total Score PHQ 2 0     Alcohol Risk Factor Screening: You do not drink alcohol or very rarely. Functional Ability and Level of Safety:   Hearing Loss  Hearing is good.     Activities of Daily Living  The home contains: no safety equipment. Patient does total self care    Fall Risk  Fall Risk Assessment, last 12 mths 11/12/2018   Able to walk? Yes   Fall in past 12 months? No       Abuse Screen  Patient is not abused    Cognitive Screening   Evaluation of Cognitive Function:  Has your family/caregiver stated any concerns about your memory: no      Patient Care Team   Patient Care Team:  Noris Brar MD as PCP - General (Internal Medicine)  Mar Jin MD (Cardiology)    Assessment/Plan   Education and counseling provided:  Are appropriate based on today's review and evaluation    ICD-10-CM ICD-9-CM    1. Medicare annual wellness visit, subsequent Z00.00 V70.0    2. Osteopenia determined by x-ray M85.80 733.90 DEXA BONE DENSITY STUDY AXIAL   3. Vitamin D deficiency E55.9 268.9    4. IGT (impaired glucose tolerance) R73.02 790.22    5. Hyperlipidemia with target LDL less than 70 E78.5 272.4    6. Essential hypertension I10 401.9    7. Low back pain without sciatica, unspecified back pain laterality, unspecified chronicity M54.5 724.2    8. Coronary artery disease involving native coronary artery of native heart without angina pectoris I25.10 414.01    9. Disorder of bone  M89.9 733.90 DEXA BONE DENSITY STUDY AXIAL     Follow-up Disposition:  Return in about 1 year (around 11/12/2019), or if symptoms worsen or fail to improve, for Medicare Wellness Visit, cholesterol, blood pressure.   results and schedule of future studies reviewed with patient  reviewed diet, exercise and weight   cardiovascular risk and specific lipid/LDL goals reviewed  reviewed medications and side effects in detail  DEXA

## 2018-11-12 NOTE — PATIENT INSTRUCTIONS
Medicare Wellness Visit, Male    The best way to live healthy is to have a lifestyle where you eat a well-balanced diet, exercise regularly, limit alcohol use, and quit all forms of tobacco/nicotine, if applicable. Regular preventive services are another way to keep healthy. Preventive services (vaccines, screening tests, monitoring & exams) can help personalize your care plan, which helps you manage your own care. Screening tests can find health problems at the earliest stages, when they are easiest to treat. [facility] follows the current, evidence-based guidelines published by the [facility] ([facility]) when recommending preventive services for our patients. Because we follow these guidelines, sometimes recommendations change over time as research supports it. (For example, a prostate screening blood test is no longer routinely recommended for men with no symptoms.)  Of course, you and your doctor may decide to screen more often for some diseases, based on your risk and co-morbidities (chronic disease you are already diagnosed with). Preventive services for you include:  - Medicare offers their members a free annual wellness visit, which is time for you and your primary care provider to discuss and plan for your preventive service needs. Take advantage of this benefit every year!  -All adults over age 72 should receive the recommended pneumonia vaccines. Current USPSTF guidelines recommend a series of two vaccines for the best pneumonia protection.   -All adults should have a flu vaccine yearly and an ECG.  All adults age 61 and older should receive a shingles vaccine once in their lifetime.    -All adults age 38-68 who are overweight should have a diabetes screening test once every three years.   -Other screening tests & preventive services for persons with diabetes include: an eye exam to screen for diabetic retinopathy, a kidney function test, a foot exam, and stricter control over your cholesterol.   -Cardiovascular screening for adults with routine risk involves an electrocardiogram (ECG) at intervals determined by the provider.   -Colorectal cancer screening should be done for adults age 54-65 with no increased risk factors for colorectal cancer. There are a number of acceptable methods of screening for this type of cancer. Each test has its own benefits and drawbacks. Discuss with your provider what is most appropriate for you during your annual wellness visit. The different tests include: colonoscopy (considered the best screening method), a fecal occult blood test, a fecal DNA test, and sigmoidoscopy.  -All adults born between [facility] should be screened once for Hepatitis C.  -An Abdominal Aortic Aneurysm (AAA) Screening is recommended for men age 73-68 who has ever smoked in their lifetime.      Here is a list of your current Health Maintenance items (your personalized list of preventive services) with a due date:  Health Maintenance Due   Topic Date Due    Shingles Vaccine (1 of 2) 09/05/1999    Annual Well Visit  11/09/2018    Glaucoma Screening   12/05/2018

## 2018-12-03 DIAGNOSIS — E78.5 HYPERLIPIDEMIA, UNSPECIFIED HYPERLIPIDEMIA TYPE: ICD-10-CM

## 2018-12-03 RX ORDER — ATORVASTATIN CALCIUM 80 MG/1
TABLET, FILM COATED ORAL
Qty: 90 TAB | Refills: 3 | Status: SHIPPED | OUTPATIENT
Start: 2018-12-03 | End: 2020-01-27

## 2018-12-07 ENCOUNTER — HOSPITAL ENCOUNTER (OUTPATIENT)
Dept: MAMMOGRAPHY | Age: 68
Discharge: HOME | End: 2018-12-07
Payer: MEDICARE

## 2018-12-07 DIAGNOSIS — M89.9 DISORDER OF BONE: ICD-10-CM

## 2018-12-07 DIAGNOSIS — M85.80 OSTEOPENIA DETERMINED BY X-RAY: ICD-10-CM

## 2018-12-07 PROCEDURE — 77080 DXA BONE DENSITY AXIAL: CPT

## 2018-12-09 NOTE — PROGRESS NOTES
Osteopenia is mildly low bone density. Take calcium + Vitamin D daily to be sure you can at least maintain your bone strength.

## 2019-04-04 ENCOUNTER — OFFICE VISIT (OUTPATIENT)
Dept: CARDIOLOGY CLINIC | Age: 69
End: 2019-04-04

## 2019-04-04 VITALS
HEART RATE: 52 BPM | HEIGHT: 69 IN | OXYGEN SATURATION: 99 % | WEIGHT: 174 LBS | SYSTOLIC BLOOD PRESSURE: 110 MMHG | BODY MASS INDEX: 25.77 KG/M2 | DIASTOLIC BLOOD PRESSURE: 70 MMHG | RESPIRATION RATE: 16 BRPM

## 2019-04-04 DIAGNOSIS — E78.5 HYPERLIPIDEMIA WITH TARGET LDL LESS THAN 70: ICD-10-CM

## 2019-04-04 DIAGNOSIS — I10 ESSENTIAL HYPERTENSION: ICD-10-CM

## 2019-04-04 DIAGNOSIS — I25.10 CORONARY ARTERY DISEASE INVOLVING NATIVE CORONARY ARTERY OF NATIVE HEART WITHOUT ANGINA PECTORIS: Primary | ICD-10-CM

## 2019-04-04 DIAGNOSIS — I77.9 CAROTID ARTERY DISEASE WITHOUT CEREBRAL INFARCTION (HCC): ICD-10-CM

## 2019-04-04 NOTE — PROGRESS NOTES
HISTORY OF PRESENT ILLNESS  Royal Rosa is a 70 y.o. 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             IGT (impaired glucose tolerance) ICD-10-CM: R73.02  ICD-9-CM: 790.22  Unknown        Advanced directives, counseling/discussion ICD-10-CM: Z71.89  ICD-9-CM: V65.49  11/21/2016    Overview Signed 11/21/2016  9:43 AM by Kylee Antoine MD     On file             Keratitis ICD-10-CM: H16.9  ICD-9-CM: 370.9  Unknown        Carotid artery disease without cerebral infarction ([facility] [address] ICD-10-CM: I77.9  ICD-9-CM: 447.9  4/9/2015        Benign bladder papilloma ICD-10-CM: D30.3  ICD-9-CM: 223.3  Unknown        Cataract ICD-10-CM: H26.9  ICD-9-CM: 366.9  Unknown        Hypertension ICD-10-CM: I10  ICD-9-CM: 401.9  10/7/2009        Hyperlipidemia with target LDL less than 70 ICD-10-CM: E78.5  ICD-9-CM: 272.4  10/7/2009        CAD (coronary artery disease) ICD-10-CM: I25.10  ICD-9-CM: 414.00  10/7/2009    Overview Addendum 3/31/2011  8:24 AM by Stefanie Dumas MD     Mr. Lock's cardiac history dates back to September 2004 when he presented with progressive angina, had an abnormal stress test and then cardiac catheterization, which showed significant left main and three-vessel coronary artery disease. He then underwent five-vessel bypass using LIMA and a free radial artery.  LV function was normal.             ED (erectile dysfunction) ICD-10-CM: N52.9  ICD-9-CM: 607.84  10/7/2009        Low back pain ICD-10-CM: M54.5  ICD-9-CM: 724.2  10/7/2009              Current Outpatient Medications on File Prior to Visit   Medication Sig    atorvastatin (LIPITOR) 80 mg tablet TAKE 1 TABLET EVERY DAY  atenolol (TENORMIN) 50 mg tablet Take 1 Tab by mouth daily.  benzonatate (TESSALON) 200 mg capsule Take 1 Cap by mouth three (3) times daily as needed for Cough.  guaiFENesin-codeine (ROBITUSSIN AC) 100-10 mg/5 mL solution Take 5 mL by mouth three (3) times daily as needed for Cough. Max Daily Amount: 15 mL.  VITAMIN B COMPLEX (B COMPLEX PO) Take  by mouth. Takes one po once daily.  MULTIVITAMIN PO Take 1 Tab by mouth daily.  FOLIC ACID PO Take 5 mg by mouth daily.  tadalafil (CIALIS) 20 mg tablet Take 20 mg by mouth as needed.  coenzyme Q-10 (CO Q-10) 200 mg capsule Take 1 Cap by mouth daily.  aspirin (ASPIRIN) 325 mg tablet Take 325 mg by mouth daily.  mupirocin (BACTROBAN) 2 % ointment Apply  to affected area two (2) times a day.  Omeprazole delayed release (PRILOSEC D/R) 20 mg tablet Take 1 Tab by mouth daily. No current facility-administered medications on file prior to visit. CARDIOLOGY STUDIES TO DATE:  4/11 normal stress echo  6/11 life line screen , mild bilateral carotid disease  4/13 carotid dopplers 10-49% right and 0-9% left stenoses  4/15 carotid dopplers 10-49% right and 0-9% left stenoses  2/18 normal stress echo    Chief Complaint   Patient presents with    Coronary Artery Disease     HPI :  Mr. Sachin Ramirez is doing great. His bladder issues are under control, though he still has to have cystoscopies every six months for now. His lipid profile looked great in November, though his CK was slightly elevated and it has been. In spite of all this, he exercises regularly with no muscle soreness or weakness.           CARDIAC ROS:   negative for chest pain, dyspnea, palpitations, syncope, orthopnea, paroxysmal nocturnal dyspnea, exertional chest pressure/discomfort, claudication, lower extremity edema    Family History   Problem Relation Age of Onset    Arthritis-osteo Mother     Hypertension Mother     Stroke Mother     High Cholesterol Mother     Cataract Mother     Other Mother         duodenal ulcer    Heart Disease Father     Cataract Father     Thyroid Disease Sister     High Cholesterol Sister    [facility] Cataract Sister        Past Medical History:   Diagnosis Date    Benign bladder papilloma     CAD (coronary artery disease) 10/7/2009    s/p CABG x 5, radial artery and internal mammary, nl stress test 3/08    Carotid artery plaque     mild on life line screening 6/11    Cataract     Diverticulosis     ED (erectile dysfunction) 10/7/2009    Hyperlipidemia LDL goal < 70 10/7/2009    Hypertension 10/7/2009    IGT (impaired glucose tolerance)     Keratitis     Low back pain 10/7/2009    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     Visit Vitals  /70 (BP 1 Location: Left arm, BP Patient Position: Sitting)   Pulse (!) 52   Resp 16   Ht 5' 9\" (1.753 m)   Wt 174 lb (78.9 kg)   SpO2 99%   BMI 25.70 kg/m²       Wt Readings from Last 3 Encounters:   04/04/19 174 lb (78.9 kg)   05/18/18 173 lb 12.8 oz (78.8 kg)   02/26/18 179 lb 3.2 oz (81.3 kg)            BP Readings from Last 3 Encounters:   04/04/19 110/70   11/12/18 128/70   05/18/18 136/84       PHYSICAL EXAM  General appearance: alert, cooperative, no distress, appears stated age  Neurologic: Alert and oriented X 3  Neck: supple, symmetrical, trachea midline, no adenopathy, no carotid bruit and no JVD  Lungs: clear to auscultation bilaterally  Heart: regular rate and rhythm, S1, S2 normal, no murmur, click, rub or gallop  Extremities: extremities normal, atraumatic, no cyanosis or edema    Lab Results   Component Value Date/Time    Cholesterol, total 134 11/06/2018 08:27 AM    Cholesterol, total 128 05/16/2018 09:39 AM    Cholesterol, total 159 11/08/2017 10:20 AM    Cholesterol, total 153 05/31/2017 09:23 AM    Cholesterol, total 143 11/21/2016 09:59 AM    HDL Cholesterol 52 11/06/2018 08:27 AM    HDL Cholesterol 55 05/16/2018 09:39 AM    HDL Cholesterol 55 11/08/2017 10:20 AM    HDL Cholesterol 58 05/31/2017 09:23 AM    HDL Cholesterol 61 11/21/2016 09:59 AM    LDL, calculated 64 11/06/2018 08:27 AM    LDL, calculated 59 05/16/2018 09:39 AM    LDL, calculated 84 11/08/2017 10:20 AM    LDL, calculated 78 05/31/2017 09:23 AM    LDL, calculated 66 11/21/2016 09:59 AM    Triglyceride 90 11/06/2018 08:27 AM    Triglyceride 71 05/16/2018 09:39 AM    Triglyceride 102 11/08/2017 10:20 AM    Triglyceride 87 05/31/2017 09:23 AM    Triglyceride 82 11/21/2016 09:59 AM    CHOL/HDL Ratio 2.3 05/19/2010 10:52 AM    CHOL/HDL Ratio 2.5 11/09/2009 11:49 AM    CHOL/HDL Ratio 2.8 01/23/2009 09:41 AM     ASSESSMENT  Mr. Lock is stable and asymptomatic, well compensated on a good medical regimen. We gave him a lab slip for follow-up blood work. He needs carotid Dopplers. current treatment plan is effective, no change in therapy  lab results and schedule of future lab studies reviewed with patient  reviewed diet, exercise and weight control    Encounter Diagnoses   Name Primary?  Coronary artery disease involving native coronary artery of native heart without angina pectoris Yes    Carotid artery disease without cerebral infarction ([address]     Essential hypertension     Hyperlipidemia with target LDL less than 70      No orders of the defined types were placed in this encounter.           Yandel Matthew MD  4/4/2019

## 2019-04-08 ENCOUNTER — TELEPHONE (OUTPATIENT)
Dept: CARDIOLOGY CLINIC | Age: 69
End: 2019-04-08

## 2019-04-08 NOTE — TELEPHONE ENCOUNTER
----- Message from Emory Banda MD sent at 4/8/2019  9:48 AM EDT -----  No change from before. Mild to mod on right nothing on left.  Will repeat in few years

## 2019-04-08 NOTE — TELEPHONE ENCOUNTER
Called patient. Verified patient's identity with two identifiers. Notified patient of results and Dr. Mary Mcclellan message. Patient verbalized understanding and denied further questions or concerns.

## 2019-06-21 DIAGNOSIS — E78.5 HYPERLIPIDEMIA WITH TARGET LDL LESS THAN 70: Primary | ICD-10-CM

## 2019-06-21 LAB
ALBUMIN SERPL-MCNC: 4.3 G/DL (ref 3.6–4.8)
ALBUMIN/GLOB SERPL: 1.9 {RATIO} (ref 1.2–2.2)
ALP SERPL-CCNC: 64 IU/L (ref 39–117)
ALT SERPL-CCNC: 21 IU/L (ref 0–44)
AST SERPL-CCNC: 28 IU/L (ref 0–40)
BILIRUB SERPL-MCNC: 0.4 MG/DL (ref 0–1.2)
BUN SERPL-MCNC: 13 MG/DL (ref 8–27)
BUN/CREAT SERPL: 15 (ref 10–24)
CALCIUM SERPL-MCNC: 9.2 MG/DL (ref 8.6–10.2)
CHLORIDE SERPL-SCNC: 104 MMOL/L (ref 96–106)
CHOLEST SERPL-MCNC: 137 MG/DL (ref 100–199)
CK SERPL-CCNC: 156 U/L (ref 24–204)
CO2 SERPL-SCNC: 26 MMOL/L (ref 20–29)
CREAT SERPL-MCNC: 0.89 MG/DL (ref 0.76–1.27)
GLOBULIN SER CALC-MCNC: 2.3 G/DL (ref 1.5–4.5)
GLUCOSE SERPL-MCNC: 87 MG/DL (ref 65–99)
HDLC SERPL-MCNC: 51 MG/DL
INTERPRETATION, 910389: NORMAL
LDLC SERPL CALC-MCNC: 70 MG/DL (ref 0–99)
POTASSIUM SERPL-SCNC: 4.3 MMOL/L (ref 3.5–5.2)
PROT SERPL-MCNC: 6.6 G/DL (ref 6–8.5)
SODIUM SERPL-SCNC: 143 MMOL/L (ref 134–144)
TRIGL SERPL-MCNC: 81 MG/DL (ref 0–149)
VLDLC SERPL CALC-MCNC: 16 MG/DL (ref 5–40)

## 2019-09-27 ENCOUNTER — TELEPHONE (OUTPATIENT)
Dept: INTERNAL MEDICINE CLINIC | Age: 69
End: 2019-09-27

## 2019-09-27 DIAGNOSIS — E55.9 VITAMIN D DEFICIENCY: Primary | ICD-10-CM

## 2019-09-27 DIAGNOSIS — N40.0 BENIGN PROSTATIC HYPERPLASIA WITHOUT LOWER URINARY TRACT SYMPTOMS: ICD-10-CM

## 2019-09-27 DIAGNOSIS — I10 ESSENTIAL HYPERTENSION: ICD-10-CM

## 2019-09-27 DIAGNOSIS — R73.02 IGT (IMPAIRED GLUCOSE TOLERANCE): ICD-10-CM

## 2019-09-27 DIAGNOSIS — E78.5 HYPERLIPIDEMIA WITH TARGET LDL LESS THAN 70: ICD-10-CM

## 2019-09-27 NOTE — TELEPHONE ENCOUNTER
General Message/Vendor Calls     Caller's first and last name:Jeremi Lock       Reason for call: Appt for [address] (last 11/12/18); nurse visit for flu shot. Callback required yes/no and why: yes       Best contact number(s):[phone]       Details to clarify the request: Sometime in November; morning appt.        M for pt to call to schedule appts

## 2019-09-30 NOTE — TELEPHONE ENCOUNTER
Pt is scheduled to see  on 11/18/19 for his Medicare Wellness. Pt wanted to have his lab's drawn prior to his 11/18/19 visit,pt's scheduled to come in and have lab's on 11/11/19 please drop order's.

## 2019-10-01 DIAGNOSIS — I25.10 CORONARY ARTERY DISEASE INVOLVING NATIVE CORONARY ARTERY OF NATIVE HEART WITHOUT ANGINA PECTORIS: ICD-10-CM

## 2019-10-01 DIAGNOSIS — I10 ESSENTIAL HYPERTENSION: ICD-10-CM

## 2019-10-01 RX ORDER — ATENOLOL 50 MG/1
TABLET ORAL
Qty: 90 TAB | Refills: 3 | Status: SHIPPED | OUTPATIENT
Start: 2019-10-01 | End: 2020-11-04

## 2019-11-12 ENCOUNTER — HOSPITAL ENCOUNTER (OUTPATIENT)
Dept: LAB | Age: 69
Discharge: HOME | End: 2019-11-12
Payer: MEDICARE

## 2019-11-12 ENCOUNTER — LAB ONLY (OUTPATIENT)
Dept: INTERNAL MEDICINE CLINIC | Age: 69
End: 2019-11-12

## 2019-11-12 DIAGNOSIS — R73.02 IGT (IMPAIRED GLUCOSE TOLERANCE): ICD-10-CM

## 2019-11-12 DIAGNOSIS — E55.9 VITAMIN D DEFICIENCY: ICD-10-CM

## 2019-11-12 DIAGNOSIS — N40.0 BENIGN PROSTATIC HYPERPLASIA WITHOUT LOWER URINARY TRACT SYMPTOMS: ICD-10-CM

## 2019-11-12 DIAGNOSIS — I10 ESSENTIAL HYPERTENSION: ICD-10-CM

## 2019-11-12 DIAGNOSIS — E78.5 HYPERLIPIDEMIA WITH TARGET LDL LESS THAN 70: ICD-10-CM

## 2019-11-12 PROCEDURE — 36415 COLL VENOUS BLD VENIPUNCTURE: CPT

## 2019-11-12 PROCEDURE — 82550 ASSAY OF CK (CPK): CPT

## 2019-11-12 PROCEDURE — 80061 LIPID PANEL: CPT

## 2019-11-12 PROCEDURE — 83036 HEMOGLOBIN GLYCOSYLATED A1C: CPT

## 2019-11-12 PROCEDURE — 84153 ASSAY OF PSA TOTAL: CPT

## 2019-11-12 PROCEDURE — 85025 COMPLETE CBC W/AUTO DIFF WBC: CPT

## 2019-11-12 PROCEDURE — 80053 COMPREHEN METABOLIC PANEL: CPT

## 2019-11-12 PROCEDURE — 82306 VITAMIN D 25 HYDROXY: CPT

## 2019-11-13 LAB
25(OH)D3+25(OH)D2 SERPL-MCNC: 31 NG/ML (ref 30–100)
ALBUMIN SERPL-MCNC: 3.9 G/DL (ref 3.5–4.8)
ALBUMIN/GLOB SERPL: 1.6 {RATIO} (ref 1.2–2.2)
ALP SERPL-CCNC: 61 IU/L (ref 39–117)
ALT SERPL-CCNC: 20 IU/L (ref 0–44)
AST SERPL-CCNC: 24 IU/L (ref 0–40)
BASOPHILS # BLD AUTO: 0.1 X10E3/UL (ref 0–0.2)
BASOPHILS NFR BLD AUTO: 1 %
BILIRUB SERPL-MCNC: 0.5 MG/DL (ref 0–1.2)
BUN SERPL-MCNC: 13 MG/DL (ref 8–27)
BUN/CREAT SERPL: 12 (ref 10–24)
CALCIUM SERPL-MCNC: 9 MG/DL (ref 8.6–10.2)
CHLORIDE SERPL-SCNC: 104 MMOL/L (ref 96–106)
CHOLEST SERPL-MCNC: 145 MG/DL (ref 100–199)
CK SERPL-CCNC: 172 U/L (ref 24–204)
CO2 SERPL-SCNC: 25 MMOL/L (ref 20–29)
CREAT SERPL-MCNC: 1.08 MG/DL (ref 0.76–1.27)
EOSINOPHIL # BLD AUTO: 0.2 X10E3/UL (ref 0–0.4)
EOSINOPHIL NFR BLD AUTO: 3 %
ERYTHROCYTE [DISTWIDTH] IN BLOOD BY AUTOMATED COUNT: 12.7 % (ref 12.3–15.4)
EST. AVERAGE GLUCOSE BLD GHB EST-MCNC: 120 MG/DL
GLOBULIN SER CALC-MCNC: 2.4 G/DL (ref 1.5–4.5)
GLUCOSE SERPL-MCNC: 89 MG/DL (ref 65–99)
HBA1C MFR BLD: 5.8 % (ref 4.8–5.6)
HCT VFR BLD AUTO: 47 % (ref 37.5–51)
HDLC SERPL-MCNC: 51 MG/DL
HGB BLD-MCNC: 15.9 G/DL (ref 13–17.7)
IMM GRANULOCYTES # BLD AUTO: 0 X10E3/UL (ref 0–0.1)
IMM GRANULOCYTES NFR BLD AUTO: 0 %
LDLC SERPL CALC-MCNC: 66 MG/DL (ref 0–99)
LYMPHOCYTES # BLD AUTO: 2.1 X10E3/UL (ref 0.7–3.1)
LYMPHOCYTES NFR BLD AUTO: 30 %
MCH RBC QN AUTO: 30.8 PG (ref 26.6–33)
MCHC RBC AUTO-ENTMCNC: 33.8 G/DL (ref 31.5–35.7)
MCV RBC AUTO: 91 FL (ref 79–97)
MONOCYTES # BLD AUTO: 0.8 X10E3/UL (ref 0.1–0.9)
MONOCYTES NFR BLD AUTO: 12 %
NEUTROPHILS # BLD AUTO: 3.8 X10E3/UL (ref 1.4–7)
NEUTROPHILS NFR BLD AUTO: 54 %
PLATELET # BLD AUTO: 173 X10E3/UL (ref 150–450)
POTASSIUM SERPL-SCNC: 4.4 MMOL/L (ref 3.5–5.2)
PROT SERPL-MCNC: 6.3 G/DL (ref 6–8.5)
PSA SERPL-MCNC: 0.6 NG/ML (ref 0–4)
RBC # BLD AUTO: 5.17 X10E6/UL (ref 4.14–5.8)
SODIUM SERPL-SCNC: 142 MMOL/L (ref 134–144)
TRIGL SERPL-MCNC: 140 MG/DL (ref 0–149)
VLDLC SERPL CALC-MCNC: 28 MG/DL (ref 5–40)
WBC # BLD AUTO: 7 X10E3/UL (ref 3.4–10.8)

## 2019-11-13 NOTE — PROGRESS NOTES
Labs are all either normal or stable and at goal.  Keep up the good work!    Continue your current medications

## 2019-11-18 ENCOUNTER — OFFICE VISIT (OUTPATIENT)
Dept: INTERNAL MEDICINE CLINIC | Age: 69
End: 2019-11-18

## 2019-11-18 VITALS
WEIGHT: 178.25 LBS | BODY MASS INDEX: 26.4 KG/M2 | OXYGEN SATURATION: 95 % | TEMPERATURE: 97.8 F | SYSTOLIC BLOOD PRESSURE: 122 MMHG | RESPIRATION RATE: 14 BRPM | HEIGHT: 69 IN | DIASTOLIC BLOOD PRESSURE: 73 MMHG | HEART RATE: 71 BPM

## 2019-11-18 DIAGNOSIS — Z13.31 SCREENING FOR DEPRESSION: ICD-10-CM

## 2019-11-18 DIAGNOSIS — Z13.39 SCREENING FOR ALCOHOLISM: ICD-10-CM

## 2019-11-18 DIAGNOSIS — R73.02 IGT (IMPAIRED GLUCOSE TOLERANCE): ICD-10-CM

## 2019-11-18 DIAGNOSIS — I25.10 CORONARY ARTERY DISEASE INVOLVING NATIVE CORONARY ARTERY OF NATIVE HEART WITHOUT ANGINA PECTORIS: ICD-10-CM

## 2019-11-18 DIAGNOSIS — E78.5 HYPERLIPIDEMIA WITH TARGET LDL LESS THAN 70: ICD-10-CM

## 2019-11-18 DIAGNOSIS — D22.9 NUMEROUS SKIN MOLES: ICD-10-CM

## 2019-11-18 DIAGNOSIS — Z00.00 MEDICARE ANNUAL WELLNESS VISIT, SUBSEQUENT: Primary | ICD-10-CM

## 2019-11-18 NOTE — PROGRESS NOTES
This is a Subsequent Medicare Annual Wellness Exam (AWV) (Performed 12 months after IPPE or effective date of Medicare Part B enrollment)    I have reviewed the patient's medical history in detail and updated the computerized patient record. History     Chief Complaint   Patient presents with    Annual Wellness Visit     Notes:  Patient not fasting. Patient had flu vaccine through pharmacy. Last eye exam done 9/19/19. Reviewed questionsa botu labs--Dr. Kendall Owens already had sent [facility] message. Reviewed A1c and Lipid/TG trends. He will work on exercise    He usually sees Dr. Kendall Owens in fall and Dr. Joanne Nunn in April-May. Usually repeats lipids with cardiology then. He notes prostate exam done with urology. Seeing April 2020--sees yearly. He is following with urology for prior cystoscopy & for history of papilloma. Urology checks prostate with that evaluation. He is not sure if needs to see dermatology regularly. He has no regular dermatologist that he sees. He moved to [facility] ago. He had skin tag removed from area around left eye in past.  The surgery above was in last 5-6yrs. His wife sees dermatology and he may see same group. Reviewed RF for skin cancer:  --notes only some sunburns as child  --no FH skin cancer including no FH melanoma  --no personal history of skin cancer or concerning skin lesions/moles that derm following  --no history of immunosuppressants  --no history of working outdoors/in sun regularly. He notes has no moles/skin lesions of specific concern. He notes has raised, stable mole right upper chest--reviewed at visit. Reviewed back and noted moles as below--he will consider/schedule derm eval as reviewed for routine skin check.       Pt had question about lipids--concern about elevation in TG:    Component      Latest Ref Rng & Units 11/12/2019 6/20/2019 11/6/2018           8:30 AM  8:03 AM  8:27 AM   Cholesterol, total      100 - 199 mg/dL 145 137 134   Triglyceride      0 - 149 mg/dL 140 81 90   HDL Cholesterol      >39 mg/dL 51 51 52   VLDL, calculated      5 - 40 mg/dL 28 16 18   LDL, calculated      0 - 99 mg/dL 66 70 64       Also reviewed in response to pt question question about elevation of his A1c/IGT. Component      Latest Ref Rng & Units 11/12/2019 11/6/2018 5/16/2018           8:30 AM  8:27 AM  9:39 AM   Hemoglobin A1c, (calculated)      4.8 - 5.6 % 5.8 (H) 5.8 (H) 5.7 (H)   Estimated average glucose      mg/dL 120 120 117         Past Medical History:   Diagnosis Date    Benign bladder papilloma     CAD (coronary artery disease) 10/7/2009    s/p CABG x 5, radial artery and internal mammary, nl stress test 3/08    Carotid artery plaque     mild on life line screening 6/11    Cataract     Diverticulosis     ED (erectile dysfunction) 10/7/2009    Hyperlipidemia LDL goal < 70 10/7/2009    Hypertension 10/7/2009    IGT (impaired glucose tolerance)     Keratitis     Low back pain 10/7/2009    Urinary bladder papilloma     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 [address], COLON, DIAGNOSTIC      no polyps 2006    HX BLADDER REPAIR  01/2018    bladder resection    HX COLONOSCOPY      HX CORONARY ARTERY BYPASS GRAFT      x 5 arteries     HX OTHER SURGICAL      pilonidal cyst removed    HX TONSILLECTOMY       Prior to Admission medications    Medication Sig Start Date End Date Taking? Authorizing Provider   atenolol (TENORMIN) 50 mg tablet TAKE 1 TABLET EVERY DAY 10/1/19  Yes Nia Fu MD   atorvastatin (LIPITOR) 80 mg tablet TAKE 1 TABLET EVERY DAY 12/3/18  Yes Nia Fu MD   benzonatate (TESSALON) 200 mg capsule Take 1 Cap by mouth three (3) times daily as needed for Cough.  1/13/17  Yes Barbara Zimmer MD   guaiFENesin-codeine [facility]) 100-10 mg/5 mL solution Take 5 mL by mouth three (3) times daily as needed for Cough. Max Daily Amount: 15 mL. 17  Yes MARLENE Daigle   VITAMIN B COMPLEX (B COMPLEX PO) Take  by mouth. Takes one po once daily. Yes Provider, Historical   MULTIVITAMIN PO Take 1 Tab by mouth daily. Yes Provider, Historical   FOLIC ACID PO Take 5 mg by mouth daily. Yes Provider, Historical   coenzyme Q-10 (CO Q-10) 200 mg capsule Take 1 Cap by mouth daily. 10/10/12  Yes Barbara Zimmer MD   aspirin (ASPIRIN) 325 mg tablet Take 325 mg by mouth daily.      Yes Provider, Historical       No Known Allergies  Family History   Problem Relation Age of Onset   [facility] Arthritis-osteo Mother     Hypertension Mother     Stroke Mother     High Cholesterol Mother     Cataract Mother     Other Mother         duodenal ulcer    Heart Disease Father     Cataract Father     Thyroid Disease Sister     High Cholesterol Sister     Cataract Sister      Social History     Tobacco Use    Smoking status: Former Smoker     Last attempt to quit: 3/31/1980     Years since quittin.6    Smokeless tobacco: Never Used   Substance Use Topics    Alcohol use: Yes     Comment: occasional     Patient Active Problem List   Diagnosis Code    Hypertension I10    Hyperlipidemia with target LDL less than 70 E78.5    CAD (coronary artery disease) I25.10    ED (erectile dysfunction) N52.9    Low back pain M54.5    Cataract H26.9    Benign bladder papilloma D30.3    Carotid artery disease without cerebral infarction ([facility] [address] I73.9    Keratitis H16.9    Advanced directives, counseling/discussion Z71.89    IGT (impaired glucose tolerance) R73.02    Urinary bladder papilloma D41.4    Vitamin D deficiency E55.9       Depression Risk Factor Screening:     3 most recent PHQ Screens 2019   Little interest or pleasure in doing things Not at all   Feeling down, depressed, irritable, or hopeless Not at all   Total Score PHQ 2 0       Alcohol Risk Factor Screening: You do not drink alcohol or very rarely. Has glass of wine 1-2times weekly. Sometimes beer or liquor on weekend, but not regularly. Functional Ability and Level of Safety:     Hearing Loss  Hearing is good. Activities of Daily Living  The home contains: no safety equipment; can add grab bars to shower, but hasn't needed yet. See below:    ADL Assessment 11/18/2019   Feeding yourself No Help Needed   Getting from bed to chair No Help Needed   Getting dressed No Help Needed   Bathing or showering No Help Needed   Walk across the room (includes cane/walker) No Help Needed   Using the telphone No Help Needed   Taking your medications No Help Needed   Preparing meals No Help Needed   Managing money (expenses/bills) No Help Needed   Moderately strenuous housework (laundry) No Help Needed   Shopping for personal items (toiletries/medicines) No Help Needed   Shopping for groceries No Help Needed   Driving No Help Needed   Climbing a flight of stairs No Help Needed   Getting to places beyond walking distances No Help Needed       Fall Risk  Fall Risk Assessment, last 12 mths 11/18/2019   Able to walk? Yes   Fall in past 12 months? No       Abuse Screen  Patient is not abused    Abuse Screening Questionnaire 11/18/2019   Do you ever feel afraid of your partner? N   Are you in a relationship with someone who physically or mentally threatens you? N   Is it safe for you to go home? Y       Cognitive Screening   Evaluation of Cognitive Function:  Has your family/caregiver stated any concerns about your memory: no      Physical Exam[de-identified]     Blood pressure 122/73, pulse 71, temperature 97.8 °F (36.6 °C), temperature source Oral, resp. rate 14, height 5' 9\" (1.753 m), weight 178 lb 4 oz (80.9 kg), SpO2 95 %.     General appearance: alert, cooperative, no distress, appears stated age  Head: Normocephalic, without obvious abnormality, atraumatic  Eyes: conjunctivae/corneas clear. PERRL. Wearing glasses. Mild arcus senilis bilat. Ears: normal TM's and external ear canals AU  Nose: Nares normal. No drainage. Throat: Lips, mucosa, and tongue normal. Teeth and gums normal  Neck: supple, symmetrical, trachea midline, no adenopathy, thyroid: not enlarged, symmetric, no tenderness/mass/nodules  Back: symmetric, no curvature. ROM normal  Lungs: clear to auscultation bilaterally  Chest wall: no tenderness  Heart: regular rate and rhythm, S1, S2 normal, no murmur, click, rub or gallop  Abdomen: soft, non-tender. Bowel sounds normal. No masses,  no organomegaly  Extremities: extremities normal, atraumatic, no cyanosis or edema  Pulses: 2+ and symmetric radial.  Skin: Skin color, texture, turgor normal. No rashes. --Right upper chest, near right clavicle, there is an ~3-4mm raised mole, with slightly dry skin, but uniformly pigmented. --Left lateral back--lower thoracic--there is an ~2mm mole--flat, but with some central darkening/increased pigmentation. --right upper back--superior to scapula--there is an ~3-4mm slightly raised/mostly flat mole, with uniform pigmentation. Reviewed based on findings, seeing dermatology for routine skin exam/cancer check advised. Can determine future need for derm follow-up once seen. Lymph nodes: Cervical nodes normal.  Neurologic: Grossly normal    Patient Care Team   Patient Care Team:  Desmond Reynaga MD as PCP - General (Internal Medicine)  Desmond Reynaga MD as PCP - [facility]  Ashley Glaser MD (Cardiology)    Advice/Referrals/Counseling   Education and counseling provided:  Are appropriate based on today's review and evaluation  End-of-Life planning (with patient's consent)  Prostate cancer screening tests (PSA, covered annually)  Colorectal cancer screening tests  Screening for glaucoma    ACPlan current and already scanned to record/chart. PSA normal/stable.   Exam with urology as reviewed above. Colonscopy current. Eye exam Sept 2019--release completed at visit. Dermatology evaluation for screening skin cancer exam reviewed with pt as above, at visit. Assessment/Plan       ICD-10-CM ICD-9-CM    1. Medicare annual wellness visit, subsequent Z00.00 V70.0    2. Screening for alcoholism Z13.39 V79.1    3. Screening for depression Z13.31 V79.0 DEPRESSION SCREEN ANNUAL   4. Numerous skin moles--routine derm evaluation reviewed. D22.9 216.9    5. Coronary artery disease involving native coronary artery of native heart without angina pectoris I25.10 414.01    6. Hyperlipidemia with target LDL less than 70 E78.5 272.4    7. IGT (impaired glucose tolerance) R73.02 790.22        Diagnoses #1-3 for Medicare Wellness/Preventive visit. Due to significant separate problems unrelated to [facility] Wellness Visit, also addressed following diagnoses/problems at visit as below (diagnoses #4-7 above). 1,2,3:  Screenings reviewed at visit. 4.  Plan dermatology evaluation as reviewed above. 5.  Seeing cardiology as below. 6.  Reviewed values above--he will work on exercise more regularly for elevation in TG. 7.  Reviewed values above. Reviewed coordination of repeating A1c with us or with cardiology with follow-up below. Follow-up and Dispositions    · Return in about 1 year (around 11/18/2020), or if symptoms worsen or fail to improve, for Medicare Wellness exam/visit.       lab results and schedule of future lab studies reviewed with patient  reviewed diet, exercise and weight control  reviewed medications and side effects in detail    For additional documentation of information and/or recommendations discussed this visit, please see notes in instructions. Plan and evaluation (above) reviewed with pt at visit  Patient voiced understanding of plan and provided with time to ask/review questions.   After Visit Summary (AVS) provided to pt after visit with additional instructions as needed/reviewed.       Health Maintenance Due   Topic Date Due    GLAUCOMA SCREENING Q2Y  12/05/2018    MEDICARE YEARLY EXAM  11/13/2019       Future Appointments   Date Time Provider Fredo Doyle   4/6/2020 10:00 AM Ofelia Schmitz  [address]

## 2019-11-18 NOTE — PATIENT INSTRUCTIONS
If you need a referral to see Dermatology, please let us know. If you need help finding a dermatologist covered by your insurance    --You can call [facility], [facility], [address] [facility] or [address] [facility], for dermatology evaluation      --You can also contact your insurance to see which providers are covered prior to calling for an appointment. The dermatologist(s) (in the group with [employer]) will only see patients with skin cancer. Medicare Wellness Visit, Male    The best way to live healthy is to have a lifestyle where you eat a well-balanced diet, exercise regularly, limit alcohol use, and quit all forms of tobacco/nicotine, if applicable. Regular preventive services are another way to keep healthy. Preventive services (vaccines, screening tests, monitoring & exams) can help personalize your care plan, which helps you manage your own care. Screening tests can find health problems at the earliest stages, when they are easiest to treat. [facility] follows the current, evidence-based guidelines published by the [facility] Yair Hafsa (USPSTF) when recommending preventive services for our patients. Because we follow these guidelines, sometimes recommendations change over time as research supports it. (For example, a prostate screening blood test is no longer routinely recommended for men with no symptoms). Of course, you and your doctor may decide to screen more often for some diseases, based on your risk and co-morbidities (chronic disease you are already diagnosed with). Preventive services for you include:  - Medicare offers their members a free annual wellness visit, which is time for you and your primary care provider to discuss and plan for your preventive service needs.  Take advantage of this benefit every year!  -All adults over age 72 should receive the recommended pneumonia vaccines. Current USPSTF guidelines recommend a series of two vaccines for the best pneumonia protection.   -All adults should have a flu vaccine yearly and tetanus vaccine every 10 years.  -All adults age 48 and older should receive the shingles vaccines (series of two vaccines). -All adults age 38-68 who are overweight should have a diabetes screening test once every three years.   -Other screening tests & preventive services for persons with diabetes include: an eye exam to screen for diabetic retinopathy, a kidney function test, a foot exam, and stricter control over your cholesterol.   -Cardiovascular screening for adults with routine risk involves an electrocardiogram (ECG) at intervals determined by the provider.   -Colorectal cancer screening should be done for adults age 54-65 with no increased risk factors for colorectal cancer. There are a number of acceptable methods of screening for this type of cancer. Each test has its own benefits and drawbacks. Discuss with your provider what is most appropriate for you during your annual wellness visit. The different tests include: colonoscopy (considered the best screening method), a fecal occult blood test, a fecal DNA test, and sigmoidoscopy.  -All adults born between [facility] should be screened once for Hepatitis C.  -An Abdominal Aortic Aneurysm (AAA) Screening is recommended for men age 73-68 who has ever smoked in their lifetime.      Here is a list of your current Health Maintenance items (your personalized list of preventive services) with a due date:  Health Maintenance Due   Topic Date Due    Glaucoma Screening   12/05/2018    Annual Well Visit  11/13/2019

## 2019-11-18 NOTE — PROGRESS NOTES
RM 17    Patient not fasting. Patient had flu vaccine through pharmacy. Last eye exam done 9/19/19. Chief Complaint   Patient presents with   Zoraida Kapadia Annual Wellness Visit     1. Have you been to the ER, urgent care clinic since your last visit? Hospitalized since your last visit? No    2. Have you seen or consulted any other health care providers outside of the [address] [place] [address] [facility] since your last visit? Include any pap smears or colon screening. No    Health Maintenance Due   Topic Date Due    GLAUCOMA SCREENING Q2Y  12/05/2018    MEDICARE YEARLY EXAM  11/13/2019     Abuse Screening Questionnaire 11/18/2019   Do you ever feel afraid of your partner? N   Are you in a relationship with someone who physically or mentally threatens you? N   Is it safe for you to go home? Y     3 most recent PHQ Screens 11/18/2019   Little interest or pleasure in doing things Not at all   Feeling down, depressed, irritable, or hopeless Not at all   Total Score PHQ 2 0     Fall Risk Assessment, last 12 mths 11/18/2019   Able to walk? Yes   Fall in past 12 months?  No       ADL Assessment 11/18/2019   Feeding yourself No Help Needed   Getting from bed to chair No Help Needed   Getting dressed No Help Needed   Bathing or showering No Help Needed   Walk across the room (includes cane/walker) No Help Needed   Using the telphone No Help Needed   Taking your medications No Help Needed   Preparing meals No Help Needed   Managing money (expenses/bills) No Help Needed   Moderately strenuous housework (laundry) No Help Needed   Shopping for personal items (toiletries/medicines) No Help Needed   Shopping for groceries No Help Needed   Driving No Help Needed   Climbing a flight of stairs No Help Needed   Getting to places beyond walking distances No Help Needed     Learning Assessment 11/18/2019   PRIMARY LEARNER Patient   HIGHEST LEVEL OF EDUCATION - PRIMARY LEARNER  -   BARRIERS PRIMARY LEARNER NONE   CO-LEARNER CAREGIVER -   PRIMARY LANGUAGE ENGLISH   LEARNER PREFERENCE PRIMARY DEMONSTRATION     LISTENING     READING     VIDEOS   ANSWERED BY patient   RELATIONSHIP SELF

## 2020-01-07 ENCOUNTER — TELEPHONE (OUTPATIENT)
Dept: INTERNAL MEDICINE CLINIC | Age: 70
End: 2020-01-07

## 2020-01-07 NOTE — TELEPHONE ENCOUNTER
IRMA for return call. When call is returned please see if he can come in on 1/9 @ 4pm with Dr. Juliet Hurtado.

## 2020-01-07 NOTE — TELEPHONE ENCOUNTER
----- Message from [facility] sent at 1/7/2020  1:13 PM EST -----  Regarding: Dr. Garcia Due: [phone]  Appointment Request From: Isrrael Felipe    With Provider: Jem Miller MD [facility]]    Preferred Date Range: 1/6/2020 - 1/10/2020    Preferred Times: Any time    Reason for visit: Request an Appointment    Comments:  I think I have developed a hernia.  I would like to have this confirmed.  If it is a hernia, I would like to discuss how serious it is and a course of action, what I should/shouldn't do.  If it is not a hernia, I would like to discuss what else it could be and what I need to do to diagnose and address the problem.

## 2020-01-09 ENCOUNTER — OFFICE VISIT (OUTPATIENT)
Dept: INTERNAL MEDICINE CLINIC | Age: 70
End: 2020-01-09

## 2020-01-09 VITALS
RESPIRATION RATE: 16 BRPM | DIASTOLIC BLOOD PRESSURE: 72 MMHG | HEART RATE: 58 BPM | BODY MASS INDEX: 26.57 KG/M2 | HEIGHT: 69 IN | WEIGHT: 179.4 LBS | OXYGEN SATURATION: 96 % | TEMPERATURE: 97.8 F | SYSTOLIC BLOOD PRESSURE: 127 MMHG

## 2020-01-09 DIAGNOSIS — K40.90 RIGHT INGUINAL HERNIA: Primary | ICD-10-CM

## 2020-01-09 NOTE — PROGRESS NOTES
RM 1    Chief Complaint   Patient presents with    Inguinal Hernia     patient believes he has a hernia right side, reports buldge and is not always noticable, first noticed it 1 month ago      1. Have you been to the ER, urgent care clinic since your last visit? Hospitalized since your last visit? No    2. Have you seen or consulted any other health care providers outside of the [address] [place] [address] since your last visit? Include any pap smears or colon screening. No    There are no preventive care reminders to display for this patient.     Learning Assessment 11/18/2019   PRIMARY LEARNER Patient   HIGHEST LEVEL OF EDUCATION - PRIMARY LEARNER  -   BARRIERS PRIMARY LEARNER NONE   CO-LEARNER CAREGIVER -   PRIMARY LANGUAGE ENGLISH   LEARNER PREFERENCE PRIMARY DEMONSTRATION     LISTENING     READING     VIDEOS   ANSWERED BY patient   RELATIONSHIP SELF

## 2020-01-09 NOTE — PROGRESS NOTES
TERESA Williamson is a 78 y.o. male, he presents today for:    New nonpainful bulge, noted in upper inguinal jesenia. Noticed that it seemed hard, first noticed it while washing in the shower. Initially doesn't notice it if he hs been lying down then becomes more notable. Right leg without injury nor problem. PMH/PSH: reviewed and updated  Sochx:  reports that he quit smoking about 39 years ago. He has never used smokeless tobacco. He reports current alcohol use. He reports that he does not use drugs. Famhx: reviewed and updated     All: No Known Allergies  Med:   Current Outpatient Medications   Medication Sig    calcium citrate/vitamin D3 (CITRACAL + D PO) Take  by mouth.  atenolol (TENORMIN) 50 mg tablet TAKE 1 TABLET EVERY DAY    atorvastatin (LIPITOR) 80 mg tablet TAKE 1 TABLET EVERY DAY    VITAMIN B COMPLEX (B COMPLEX PO) Take  by mouth. Takes one po once daily.  MULTIVITAMIN PO Take 1 Tab by mouth daily.  FOLIC ACID PO Take 5 mg by mouth daily.  coenzyme Q-10 (CO Q-10) 200 mg capsule Take 1 Cap by mouth daily.  aspirin (ASPIRIN) 325 mg tablet Take 325 mg by mouth daily. No current facility-administered medications for this visit. ROS    PE:  Blood pressure 127/72, pulse (!) 58, temperature 97.8 °F (36.6 °C), temperature source Oral, resp. rate 16, height 5' 9\" (1.753 m), weight 179 lb 6.4 oz (81.4 kg), SpO2 96 %. Body mass index is 26.49 kg/m². Physical Exam  Vitals signs and nursing note reviewed. Constitutional:       General: He is not in acute distress. Appearance: Normal appearance. He is well-developed. HENT:      Head: Normocephalic and atraumatic. Nose: Nose normal.      Mouth/Throat:      Mouth: Mucous membranes are moist.   Eyes:      Extraocular Movements: Extraocular movements intact. Conjunctiva/sclera: Conjunctivae normal.      Pupils: Pupils are equal, round, and reactive to light.    Neck:      Musculoskeletal: Normal range of motion and neck supple. Cardiovascular:      Rate and Rhythm: Normal rate and regular rhythm. Pulses: Normal pulses. Heart sounds: Normal heart sounds. Pulmonary:      Effort: Pulmonary effort is normal.      Breath sounds: Normal breath sounds. Abdominal:      General: There is no distension. Palpations: Abdomen is soft. Hernia: A hernia (right inguinal crease with soft mass at midpoint, reduces while standing, and more easily while lying flat. no pulsations. clearly hernia. non-tender) is present. Musculoskeletal: Normal range of motion. Skin:     General: Skin is warm. Neurological:      General: No focal deficit present. Mental Status: He is alert and oriented to person, place, and time. Labs:   No results found for any visits on 01/09/20. A/P:  78 y.o. male    ICD-10-CM ICD-9-CM    1. Right inguinal hernia K40.90 550.90 REFERRAL TO GENERAL SURGERY      - agree with patient self assessment of hernia. (likely inguinal and not femoral)   - Discussed evaluation with surgeon.    - Patient wanting to know if surgery absolutely necessary. Advised that this is dependent on type of hernia and patient factors. encouraged him to explore options with surgeon. - He was given AVS and expressed understanding with the diagnosis and plan as discussed.     Future Appointments   Date Time Provider Fredo Doyle   4/6/2020 10:00 AM Nidia Ortiz  [address]

## 2020-01-09 NOTE — PATIENT INSTRUCTIONS
Inguinal Hernia: Care Instructions  Your Care Instructions    An inguinal hernia occurs when tissue bulges through a weak spot in your groin area. You may see or feel a tender bulge in the groin or scrotum. You may also have pain, pressure or burning, or a feeling that something has \"given way. \"  Hernias are caused by a weakness in the belly wall. The bulge or discomfort may occur after heavy lifting, straining, or coughing. Hernias do not heal on their own, and they tend to get worse over time. If your hernia does not bother you, you most likely can wait to have surgery. Your hernia may get worse, but it may not. In some cases, hernias that are small and painless may never need to be repaired. Follow-up care is a key part of your treatment and safety. Be sure to make and go to all appointments, and call your doctor if you are having problems. It's also a good idea to know your test results and keep a list of the medicines you take. How can you care for yourself at home? · Take pain medicines exactly as directed. ? If the doctor gave you a prescription medicine for pain, take it as prescribed. ? If you are not taking a prescription pain medicine, ask your doctor if you can take an over-the-counter medicine. · Use proper lifting techniques, and avoid heavy lifting if you can. To lift things more safely, bend your knees and let your arms and legs do the work. Keep your back straight, and do not bend over at the waist. Keep the load as close to your body as you can. Move your feet instead of turning or twisting your body. · Lose weight if you are overweight. · Include fruits, vegetables, legumes, and whole grains in your diet each day. These foods are high in fiber and will make it easier to avoid straining during bowel movements. · Do not smoke. Smoking can cause coughing, which can cause your hernia to bulge. If you need help quitting, talk to your doctor about stop-smoking programs and medicines. These can increase your chances of quitting for good. When should you call for help? Call your doctor now or seek immediate medical care if:    · You have new or worse belly pain.     · You are vomiting.     · You cannot pass stools or gas.     · You cannot push the hernia back into place with gentle pressure when you are lying down.     · The area over the hernia turns red or becomes tender.    Watch closely for changes in your health, and be sure to contact your doctor if you have any problems. Where can you learn more? Go to http://jose l-dulce maria.info/. Enter C177 in the search box to learn more about \"Inguinal Hernia: Care Instructions. \"  Current as of: November 7, 2018  Content Version: 12.2  © 4089-9754 [employer]. Care instructions adapted under license by [employer] (which disclaims liability or warranty for this information). If you have questions about a medical condition or this instruction, always ask your healthcare professional. [place] [address] any warranty or liability for your use of this information.

## 2020-01-22 ENCOUNTER — OFFICE VISIT (OUTPATIENT)
Dept: SURGERY | Age: 70
End: 2020-01-22

## 2020-01-22 VITALS
WEIGHT: 177.5 LBS | OXYGEN SATURATION: 98 % | HEART RATE: 63 BPM | RESPIRATION RATE: 20 BRPM | HEIGHT: 69 IN | TEMPERATURE: 98.3 F | DIASTOLIC BLOOD PRESSURE: 78 MMHG | SYSTOLIC BLOOD PRESSURE: 133 MMHG | BODY MASS INDEX: 26.29 KG/M2

## 2020-01-22 DIAGNOSIS — K40.90 RIGHT INGUINAL HERNIA: Primary | ICD-10-CM

## 2020-01-22 NOTE — PROGRESS NOTES
1. Have you been to the ER, urgent care clinic since your last visit? Hospitalized since your last visit? new patient    2. Have you seen or consulted any other health care providers outside of the [address] [place] [address] since your last visit? Include any pap smears or colon screening.  New patient

## 2020-01-23 NOTE — PROGRESS NOTES
[facility] at [facility] History and Physical    History of Present Illness:      Caprice Caban is a 78 y.o. male who has a new right inguinal hernia. The hernia bulge has been present a few weeks. He noticed a bulge in the R groin that did not have any pain. He has no dicomfort either. He notices the bulge more with straining and lifitng. It will go away with rest.  He does not have any changes in BM, no N/V. 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 [address] 78, COLON, DIAGNOSTIC      no polyps 2006    HX BLADDER REPAIR  01/2018    bladder resection    HX COLONOSCOPY      HX CORONARY ARTERY BYPASS GRAFT      x 5 arteries     HX OTHER SURGICAL      pilonidal cyst removed    HX TONSILLECTOMY           Current Outpatient Medications:     calcium citrate/vitamin D3 (CITRACAL + D PO), Take  by mouth., Disp: , Rfl:     atenolol (TENORMIN) 50 mg tablet, TAKE 1 TABLET EVERY DAY, Disp: 90 Tab, Rfl: 3    atorvastatin (LIPITOR) 80 mg tablet, TAKE 1 TABLET EVERY DAY, Disp: 90 Tab, Rfl: 3    VITAMIN B COMPLEX (B COMPLEX PO), Take  by mouth. Takes one po once daily. , Disp: , Rfl:     MULTIVITAMIN PO, Take 1 Tab by mouth daily. , Disp: , Rfl:     FOLIC ACID PO, Take 5 mg by mouth daily. , Disp: , Rfl:     coenzyme Q-10 (CO Q-10) 200 mg capsule, Take 1 Cap by mouth daily. , Disp: 30 Cap, Rfl: 11    aspirin (ASPIRIN) 325 mg tablet, Take 325 mg by mouth daily.   , Disp: , Rfl:     No Known Allergies    Social History     Socioeconomic History    Marital status:      Spouse name: Not on file    Number of children: Not on file    Years of education: Not on file    Highest education level: Not on file   Occupational History    Not on file   Social Needs    Financial resource strain: Not on file    Food insecurity:     Worry: Not on file     Inability: Not on file    Transportation needs:     Medical: Not on file     Non-medical: Not on file   Tobacco Use    Smoking status: Former Smoker     Last attempt to quit: 3/31/1980     Years since quittin.8    Smokeless tobacco: Never Used   Substance and Sexual Activity    Alcohol use: Yes     Comment: occasional    Drug use: No    Sexual activity: Yes     Partners: Female   Lifestyle    Physical activity:     Days per week: Not on file     Minutes per session: Not on file    Stress: Not on file   Relationships    Social connections:     Talks on phone: Not on file     Gets together: Not on file     Attends Sikh service: Not on file     Active member of club or organization: Not on file     Attends meetings of clubs or organizations: Not on file     Relationship status: Not on file    Intimate partner violence:     Fear of current or ex partner: Not on file     Emotionally abused: Not on file     Physically abused: Not on file     Forced sexual activity: Not on file   Other Topics Concern    Not on file   Social History Narrative    Not on file       Family History   Problem Relation Age of Onset    Arthritis-osteo Mother     Hypertension Mother     Stroke Mother     High Cholesterol Mother     Cataract Mother     Other Mother         duodenal ulcer    Heart Disease Father     Cataract Father     Thyroid Disease Sister     High Cholesterol Sister     Cataract Sister        ROS   Constitutional: negative  Ears, Nose, Mouth, Throat, and Face: negative  Respiratory: negative  Cardiovascular: negative  Gastrointestinal: right inguinal hernia  Genitourinary:negative  Integument/Breast: negative  Hematologic/Lymphatic: negative  Behavioral/Psychiatric: negative  Allergic/Immunologic: negative      Physical Exam:     Visit Vitals  /78   Pulse 63   Temp 98.3 °F (36.8 °C)   Resp 20   Ht 5' 9\" (1.753 m)   Wt 177 lb 8 oz (80.5 kg)   SpO2 98%   BMI 26.21 kg/m²       General - alert and oriented, no apparent distress  HEENT - no jaundice, no hearing imparement  Pulm - CTAB, no C/W/R  CV - RRR, no M/R/G  Abd - soft, ND, BS present, NTTP, right inguinal hernia present, soft, small and reducible  Ext - pulses intact in UE and LE bilaterally, no edema  Skin - supple, no rashes  Psychiatric - normal affect, good mood    Labs  Lab Results   Component Value Date/Time    Sodium 142 11/12/2019 08:30 AM    Potassium 4.4 11/12/2019 08:30 AM    Chloride 104 11/12/2019 08:30 AM    CO2 25 11/12/2019 08:30 AM    Anion gap 6 02/14/2018 05:06 PM    Glucose 89 11/12/2019 08:30 AM    BUN 13 11/12/2019 08:30 AM    Creatinine 1.08 11/12/2019 08:30 AM    BUN/Creatinine ratio 12 11/12/2019 08:30 AM    GFR est  06/20/2019 08:03 AM    GFR est non-AA 87 06/20/2019 08:03 AM    Calcium 9.0 11/12/2019 08:30 AM    Bilirubin, total 0.5 11/12/2019 08:30 AM    AST (SGOT) 24 11/12/2019 08:30 AM    Alk.  phosphatase 61 11/12/2019 08:30 AM    Protein, total 6.3 11/12/2019 08:30 AM    Albumin 3.9 11/12/2019 08:30 AM    Globulin 3.9 02/14/2018 05:06 PM    A-G Ratio 1.6 11/12/2019 08:30 AM    ALT (SGPT) 20 11/12/2019 08:30 AM     Lab Results   Component Value Date/Time    WBC 7.0 11/12/2019 08:30 AM    HGB 15.9 11/12/2019 08:30 AM    HCT 47.0 11/12/2019 08:30 AM    PLATELET 307 54/71/8170 08:30 AM    MCV 91 11/12/2019 08:30 AM Imaging  none  I have reviewed and agree with all of the pertinent images    Assessment:     Alfonso Barrett is a 78 y.o. male with right inguinal hernia    Recommendations:     1. He does have a small right inguinal hernia but is not having any pain. He would like to avoid surgery for now. The hernia is small, reducible and asymptomatic and should be fine to watch. He will return if the hernia gets bigger or more painful. Follow up PRN. Sarahi Wheeler MD    Mr. Lock has a reminder for a \"due or due soon\" health maintenance. I have asked that he contact his primary care provider for follow-up on this health maintenance.

## 2020-01-25 DIAGNOSIS — E78.5 HYPERLIPIDEMIA, UNSPECIFIED HYPERLIPIDEMIA TYPE: ICD-10-CM

## 2020-01-27 RX ORDER — ATORVASTATIN CALCIUM 80 MG/1
TABLET, FILM COATED ORAL
Qty: 90 TAB | Refills: 3 | Status: SHIPPED | OUTPATIENT
Start: 2020-01-27 | End: 2021-01-14 | Stop reason: SDUPTHER

## 2020-05-04 ENCOUNTER — VIRTUAL VISIT (OUTPATIENT)
Dept: CARDIOLOGY CLINIC | Age: 70
End: 2020-05-04

## 2020-05-04 VITALS
HEART RATE: 57 BPM | SYSTOLIC BLOOD PRESSURE: 117 MMHG | TEMPERATURE: 98.2 F | WEIGHT: 162.8 LBS | DIASTOLIC BLOOD PRESSURE: 62 MMHG | HEIGHT: 69 IN | BODY MASS INDEX: 24.11 KG/M2

## 2020-05-04 DIAGNOSIS — I10 ESSENTIAL HYPERTENSION: ICD-10-CM

## 2020-05-04 DIAGNOSIS — E78.5 HYPERLIPIDEMIA WITH TARGET LDL LESS THAN 70: ICD-10-CM

## 2020-05-04 DIAGNOSIS — I77.9 CAROTID ARTERY DISEASE WITHOUT CEREBRAL INFARCTION (HCC): ICD-10-CM

## 2020-05-04 DIAGNOSIS — I25.10 CORONARY ARTERY DISEASE INVOLVING NATIVE CORONARY ARTERY OF NATIVE HEART WITHOUT ANGINA PECTORIS: Primary | ICD-10-CM

## 2020-05-04 NOTE — PROGRESS NOTES
[facility] Telemedicine Encounter                                                         Pursuant to the emergency declaration under the [facility] waiver authority and the [employer] Act, this Virtual  Visit was conducted, with patient's consent, to reduce the patient's risk of exposure to COVID-19 and provide continuity of care for an established patient. Services were provided through a video synchronous discussion virtually to substitute for in-person clinic visit. 4/11 normal stress echo  6/11 life line screen , mild bilateral carotid disease  4/13 carotid dopplers 10-49% right and 0-9% left stenoses  4/15 carotid dopplers 10-49% right and 0-9% left stenoses  2/18 normal stress echo   Subjective/HPI:   Pramod Wyatt is a 78 y.o. male who was seen by synchronous (real-time) audio-video technology on 5/4/2020. He is doing really well. He has been able to exercise throughout all this and his weight is holding steady and blood pressures under good control. He is not having any problems with his medications. He needs to have an inguinal hernia repair and so we talked about that at length pros versus cons given of the epidemic. They had a trip planned to the [place] by ship in the fall, and it may be canceled.     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      Past Surgical History:   Procedure Laterality Date    CARDIAC SURG PROCEDURE UNLIST      CABG x 5 vessels    COLONOSCOPY N/A 7/19/2016    [facility] ENDOSCOPY    ENDOSCOPY, COLON, DIAGNOSTIC      no polyps 2006    HX BLADDER REPAIR  01/2018    bladder resection    HX COLONOSCOPY      HX CORONARY ARTERY BYPASS GRAFT      x 5 arteries     HX OTHER SURGICAL      pilonidal cyst removed    HX TONSILLECTOMY       No Known Allergies   Family History   Problem Relation Age of Onset    Arthritis-osteo Mother     Hypertension Mother     Stroke Mother     High Cholesterol Mother     Cataract Mother     Other Mother         duodenal ulcer    Heart Disease Father     Cataract Father     Thyroid Disease Sister     High Cholesterol Sister     Cataract Sister       Current Outpatient Medications   Medication Sig    atorvastatin (LIPITOR) 80 mg tablet TAKE 1 TABLET EVERY DAY    calcium citrate/vitamin D3 (CITRACAL + D PO) Take 1 Tab by mouth daily.  atenolol (TENORMIN) 50 mg tablet TAKE 1 TABLET EVERY DAY    VITAMIN B COMPLEX (B COMPLEX PO) Take 1 Tab by mouth daily.  MULTIVITAMIN PO Take 1 Tab by mouth daily.  FOLIC ACID PO Take 5 mg by mouth daily.  coenzyme Q-10 (CO Q-10) 200 mg capsule Take 1 Cap by mouth daily.  aspirin (ASPIRIN) 325 mg tablet Take 325 mg by mouth daily. No current facility-administered medications for this visit.        Vitals:    05/04/20 1421   BP: 117/62   Pulse: (!) 57   Temp: 98.2 °F (36.8 °C)   Weight: 162 lb 12.8 oz (73.8 kg)   Height: 5' 9\" (1.753 m)     Social History     Socioeconomic History    Marital status:      Spouse name: Not on file    Number of children: Not on file    Years of education: Not on file    Highest education level: Not on file   Occupational History    Not on file   Social Needs    Financial resource strain: Not on file    Food insecurity     Worry: Not on file     Inability: Not on file    Transportation needs     Medical: Not on file     Non-medical: Not on file   Tobacco Use    Smoking status: Former Smoker     Last attempt to quit: 3/31/1980     Years since quittin.1    Smokeless tobacco: Never Used   Substance and Sexual Activity    Alcohol use: Yes     Comment: occasional    Drug use: No    Sexual activity: Yes     Partners: Female   Lifestyle    Physical activity     Days per week: Not on file     Minutes per session: Not on file    Stress: Not on file   Relationships    Social connections     Talks on phone: Not on file     Gets together: Not on file     Attends Cheondoism service: Not on file     Active member of club or organization: Not on file     Attends meetings of clubs or organizations: Not on file     Relationship status: Not on file    Intimate partner violence     Fear of current or ex partner: Not on file     Emotionally abused: Not on file     Physically abused: Not on file     Forced sexual activity: Not on file   Other Topics Concern    Not on file   Social History Narrative    Not on file       Review of Symptoms  11 systems reviewed, negative other than as stated in the HPI    Physical Exam:    Due to this being a TeleHealth evaluation, many elements of the physical examination are unable to be assessed. General: Well developed, in no acute distress, cooperative and alert  HEENT: Pupils equal/round. No marked JVD visible on video. Respiratory: No audible wheezing, no signs of respiratory distress, lips non cyanotic  Extremities:  No edema  Neuro: A&Ox3, speech clear, no facial droop, answering questions appropriately  Skin: Skin color is normal. No rashes or lesions.  Non diaphoretic on visible skin during exam      Cardiology Labs:  Lab Results   Component Value Date/Time    Cholesterol, total 145 2019 08:30 AM    HDL Cholesterol 51 2019 08:30 AM    LDL, calculated 66 2019 08:30 AM    Triglyceride 140 11/12/2019 08:30 AM    CHOL/HDL Ratio 2.3 05/19/2010 10:52 AM       Lab Results   Component Value Date/Time    Sodium 142 11/12/2019 08:30 AM    Potassium 4.4 11/12/2019 08:30 AM    Chloride 104 11/12/2019 08:30 AM    CO2 25 11/12/2019 08:30 AM    Anion gap 6 02/14/2018 05:06 PM    Glucose 89 11/12/2019 08:30 AM    BUN 13 11/12/2019 08:30 AM    Creatinine 1.08 11/12/2019 08:30 AM    BUN/Creatinine ratio 12 11/12/2019 08:30 AM    GFR est  06/20/2019 08:03 AM    GFR est non-AA 87 06/20/2019 08:03 AM    Calcium 9.0 11/12/2019 08:30 AM    Bilirubin, total 0.5 11/12/2019 08:30 AM    AST (SGOT) 24 11/12/2019 08:30 AM    Alk. phosphatase 61 11/12/2019 08:30 AM    Protein, total 6.3 11/12/2019 08:30 AM    Albumin 3.9 11/12/2019 08:30 AM    Globulin 3.9 02/14/2018 05:06 PM    A-G Ratio 1.6 11/12/2019 08:30 AM    ALT (SGPT) 20 11/12/2019 08:30 AM         Assessment:     Diagnoses and all orders for this visit:    1. Coronary artery disease involving native coronary artery of native heart without angina pectoris    2. Carotid artery disease without cerebral infarction ([facility] [address]    3. Essential hypertension    4. Hyperlipidemia with target LDL less than 70        ICD-10-CM ICD-9-CM    1. Coronary artery disease involving native coronary artery of native heart without angina pectoris I25.10 414.01    2. Carotid artery disease without cerebral infarction (HCC) I73.9 447.9    3. Essential hypertension I10 401.9    4. Hyperlipidemia with target LDL less than 70 E78.5 272.4      No orders of the defined types were placed in this encounter. Plan:   He is stable and asymptomatic, well compensated on a good medical regimen and needs no cardiac testing at this time. We will send him for a fasting lipid profile. He is an excellent candidate for hernia repair from a cardiac perspective and I reminded him that he will need to hold his aspirin for 7 days prior to the surgery.         current treatment plan is effective, no change in therapy  lab results and schedule of future lab studies reviewed with patient  reviewed diet, exercise and weight control  We discussed the expected course, resolution and complications of the diagnosis(es) in detail. Medication risks, benefits, costs, interactions, and alternatives were discussed as indicated. I advised him to contact the office if his condition worsens, changes or fails to improve as anticipated. He expressed understanding with the diagnosis(es) and plan    [address] [facility] [address], MD    Greater than 20 minutes was spent in direct video patient care, planning and chart review. This visit was conducted using [employer] telemedicine services.

## 2020-05-05 ENCOUNTER — OFFICE VISIT (OUTPATIENT)
Dept: SURGERY | Age: 70
End: 2020-05-05

## 2020-05-05 VITALS
TEMPERATURE: 98 F | HEART RATE: 60 BPM | HEIGHT: 69 IN | SYSTOLIC BLOOD PRESSURE: 144 MMHG | BODY MASS INDEX: 25.18 KG/M2 | OXYGEN SATURATION: 98 % | RESPIRATION RATE: 18 BRPM | DIASTOLIC BLOOD PRESSURE: 92 MMHG | WEIGHT: 170 LBS

## 2020-05-05 DIAGNOSIS — K40.90 RIGHT INGUINAL HERNIA: Primary | ICD-10-CM

## 2020-05-05 NOTE — PROGRESS NOTES
[employer] [facility] at [facility] Surgery History and Physical    History of Present Illness:      Emily Andrews is a 78 y.o. male who I saw a few months ago for a right inguinal hernia. He still has the same hernia but thinks is gotten a little bit bigger. The hernia is still not causing any pain or discomfort. But the bulge is a little bit larger currently. He is worried about having the hernia get bigger and causing more problems and is now interested in having surgery. 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 Tony 78, COLON, DIAGNOSTIC      no polyps 2006    HX BLADDER REPAIR  01/2018    bladder resection    HX COLONOSCOPY      HX CORONARY ARTERY BYPASS GRAFT      x 5 arteries     HX OTHER SURGICAL      pilonidal cyst removed    HX TONSILLECTOMY           Current Outpatient Medications:     atorvastatin (LIPITOR) 80 mg tablet, TAKE 1 TABLET EVERY DAY, Disp: 90 Tab, Rfl: 3    calcium citrate/vitamin D3 (CITRACAL + D PO), Take 1 Tab by mouth daily. , Disp: , Rfl:     atenolol (TENORMIN) 50 mg tablet, TAKE 1 TABLET EVERY DAY, Disp: 90 Tab, Rfl: 3    VITAMIN B COMPLEX (B COMPLEX PO), Take 1 Tab by mouth daily. , Disp: , Rfl:     MULTIVITAMIN PO, Take 1 Tab by mouth daily. , Disp: , Rfl:     FOLIC ACID PO, Take 5 mg by mouth daily. , Disp: , Rfl:     coenzyme Q-10 (CO Q-10) 200 mg capsule, Take 1 Cap by mouth daily. , Disp: 30 Cap, Rfl: 11    aspirin (ASPIRIN) 325 mg tablet, Take 325 mg by mouth daily.   , Disp: , Rfl:     No Known Allergies    Social History     Socioeconomic History    Marital status:      Spouse name: Not on file    Number of children: Not on file    Years of education: Not on file    Highest education level: Not on file   Occupational History    Not on file   Social Needs    Financial resource strain: Not on file    Food insecurity     Worry: Not on file     Inability: Not on file    Transportation needs     Medical: Not on file     Non-medical: Not on file   Tobacco Use    Smoking status: Former Smoker     Last attempt to quit: 3/31/1980     Years since quittin.1    Smokeless tobacco: Never Used   Substance and Sexual Activity    Alcohol use: Yes     Comment: occasional    Drug use: No    Sexual activity: Yes     Partners: Female   Lifestyle    Physical activity     Days per week: Not on file     Minutes per session: Not on file    Stress: Not on file   Relationships    Social connections     Talks on phone: Not on file     Gets together: Not on file     Attends Muslim service: Not on file     Active member of club or organization: Not on file     Attends meetings of clubs or organizations: Not on file     Relationship status: Not on file    Intimate partner violence     Fear of current or ex partner: Not on file     Emotionally abused: Not on file     Physically abused: Not on file     Forced sexual activity: Not on file   Other Topics Concern    Not on file   Social History Narrative    Not on file       Family History   Problem Relation Age of Onset    Arthritis-osteo Mother     Hypertension Mother     Stroke Mother     High Cholesterol Mother     Cataract Mother     Other Mother         duodenal ulcer    Heart Disease Father     Cataract Father     Thyroid Disease Sister     High Cholesterol Sister     Cataract Sister        ROS   Constitutional: negative  Ears, Nose, Mouth, Throat, and Face: negative  Respiratory: negative  Cardiovascular: negative  Gastrointestinal: Right groin bulge  Genitourinary:negative  Integument/Breast: negative  Hematologic/Lymphatic: negative  Behavioral/Psychiatric: negative  Allergic/Immunologic: negative      Physical Exam:     Visit Vitals  BP (!) 144/92 (BP 1 Location: Left arm, BP Patient Position: Sitting)   Pulse 60   Temp 98 °F (36.7 °C) (Oral)   Resp 18   Ht 5' 9\" (1.753 m)   Wt 170 lb (77.1 kg)   SpO2 98%   BMI 25.10 kg/m²       General - alert and oriented, no apparent distress  HEENT - no jaundice, no hearing imparement  Pulm - CTAB, no C/W/R  CV - RRR, no M/R/G  Abd -soft, nondistended, bowel sounds present, right inguinal hernia present soft small and reducible, possible weakness in left groin no obvious hernia but could have a small developing left inguinal hernia  Ext - pulses intact in UE and LE bilaterally, no edema  Skin - supple, no rashes  Psychiatric - normal affect, good mood    Labs  Lab Results   Component Value Date/Time    Sodium 142 11/12/2019 08:30 AM    Potassium 4.4 11/12/2019 08:30 AM    Chloride 104 11/12/2019 08:30 AM    CO2 25 11/12/2019 08:30 AM    Anion gap 6 02/14/2018 05:06 PM    Glucose 89 11/12/2019 08:30 AM    BUN 13 11/12/2019 08:30 AM    Creatinine 1.08 11/12/2019 08:30 AM    BUN/Creatinine ratio 12 11/12/2019 08:30 AM    GFR est  06/20/2019 08:03 AM    GFR est non-AA 87 06/20/2019 08:03 AM    Calcium 9.0 11/12/2019 08:30 AM    Bilirubin, total 0.5 11/12/2019 08:30 AM    AST (SGOT) 24 11/12/2019 08:30 AM    Alk.  phosphatase 61 11/12/2019 08:30 AM    Protein, total 6.3 11/12/2019 08:30 AM    Albumin 3.9 11/12/2019 08:30 AM    Globulin 3.9 02/14/2018 05:06 PM    A-G Ratio 1.6 11/12/2019 08:30 AM    ALT (SGPT) 20 11/12/2019 08:30 AM Lab Results   Component Value Date/Time    WBC 7.0 11/12/2019 08:30 AM    HGB 15.9 11/12/2019 08:30 AM    HCT 47.0 11/12/2019 08:30 AM    PLATELET 981 88/42/0622 08:30 AM    MCV 91 11/12/2019 08:30 AM         Imaging  none  I have reviewed and agree with all of the pertinent images    Assessment:     Citlali Sorenson is a 78 y.o. male with right inguinal hernia    Recommendations:     1. He does have a small right inguinal hernia and will need laparoscopic right inguinal hernia repair with mesh. He has a slight weakness possibly on the left side could be a small hernia but we can determine at the time of surgery. He is not quite sure even if there was a tiny hernia there that he would want to have it fixed I will talk with him again prior to surgery. He has a history of a cystoscopy and bladder biopsy but has not had any major bladder surgery. We will proceed forward with laparoscopic right inguinal hernia repair with mesh. He is also been cleared by his cardiologist for surgery. I have discussed the above procedure with the patient in detail. We reviewed the benefits and possible complications of the surgery which include bleeding, infection, damage to adjacent organs, venous thromboembolism, need for repeat surgery, death and other unforseen complications. The patient agreed to proceed with the surgery. Julia White MD    Mr. Lock has a reminder for a \"due or due soon\" health maintenance. I have asked that he contact his primary care provider for follow-up on this health maintenance.

## 2020-05-05 NOTE — H&P (VIEW-ONLY)
[facility] at [facility] Surgery History and Physical 
 
History of Present Illness:  
  
Avani Ortiz is a 78 y.o. male who I saw a few months ago for a right inguinal hernia. He still has the same hernia but thinks is gotten a little bit bigger. The hernia is still not causing any pain or discomfort. But the bulge is a little bit larger currently. He is worried about having the hernia get bigger and causing more problems and is now interested in having surgery. Past Medical History:  
Diagnosis Date  Benign bladder papilloma  CAD (coronary artery disease) 10/7/2009  
 s/p CABG x 5, radial artery and internal mammary, nl stress test 3/08  Carotid artery plaque   
 mild on life line screening 6/11  Cataract  Diverticulosis  ED (erectile dysfunction) 10/7/2009  Hyperlipidemia LDL goal < 70 10/7/2009  Hypertension 10/7/2009  IGT (impaired glucose tolerance)  Keratitis  Low back pain 10/7/2009  Urinary bladder papilloma   
 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  
 
 
Past Surgical History:  
Procedure Laterality Date  CARDIAC SURG PROCEDURE UNLIST CABG x 5 vessels  COLONOSCOPY N/A 7/19/2016 COL[facility]COPY performed by Carroll Kwan MD at [facility] ENDOSCOPY  ENDOSCOPY, COLON, DIAGNOSTIC    
 no polyps 2006  HX BLADDER REPAIR  01/2018  
 bladder resection  HX COLONOSCOPY    
 HX CORONARY ARTERY BYPASS GRAFT    
 x 5 arteries  HX OTHER SURGICAL    
 pilonidal cyst removed  HX TONSILLECTOMY Current Outpatient Medications:  
  atorvastatin (LIPITOR) 80 mg tablet, TAKE 1 TABLET EVERY DAY, Disp: 90 Tab, Rfl: 3 
  calcium citrate/vitamin D3 (CITRACAL + D PO), Take 1 Tab by mouth daily. , Disp: , Rfl:  
  atenolol (TENORMIN) 50 mg tablet, TAKE 1 TABLET EVERY DAY, Disp: 90 Tab, Rfl: 3 
  VITAMIN B COMPLEX (B COMPLEX PO), Take 1 Tab by mouth daily. , Disp: , Rfl:  
   MULTIVITAMIN PO, Take 1 Tab by mouth daily. , Disp: , Rfl:  
  FOLIC ACID PO, Take 5 mg by mouth daily. , Disp: , Rfl:  
  coenzyme Q-10 (CO Q-10) 200 mg capsule, Take 1 Cap by mouth daily. , Disp: 30 Cap, Rfl: 11 
  aspirin (ASPIRIN) 325 mg tablet, Take 325 mg by mouth daily. , Disp: , Rfl:  
 
No Known Allergies Social History Socioeconomic History  Marital status:  Spouse name: Not on file  Number of children: Not on file  Years of education: Not on file  Highest education level: Not on file Occupational History  Not on file Social Needs  Financial resource strain: Not on file  Food insecurity Worry: Not on file Inability: Not on file  Transportation needs Medical: Not on file Non-medical: Not on file Tobacco Use  Smoking status: Former Smoker Last attempt to quit: 3/31/1980 Years since quittin.1  Smokeless tobacco: Never Used Substance and Sexual Activity  Alcohol use: Yes Comment: occasional  
 Drug use: No  
 Sexual activity: Yes  
  Partners: Female Lifestyle  Physical activity Days per week: Not on file Minutes per session: Not on file  Stress: Not on file Relationships  Social connections Talks on phone: Not on file Gets together: Not on file Attends Evangelical service: Not on file Active member of club or organization: Not on file Attends meetings of clubs or organizations: Not on file Relationship status: Not on file  Intimate partner violence Fear of current or ex partner: Not on file Emotionally abused: Not on file Physically abused: Not on file Forced sexual activity: Not on file Other Topics Concern  Not on file Social History Narrative  Not on file Family History Problem Relation Age of Onset Rupinder Me Arthritis-osteo Mother  Hypertension Mother  Stroke Mother  High Cholesterol Mother Rupinder Me Cataract Mother  Other Mother   
     duodenal ulcer  Heart Disease Father  Cataract Father  Thyroid Disease Sister  High Cholesterol Sister  Cataract Sister ROS Constitutional: negative Ears, Nose, Mouth, Throat, and Face: negative Respiratory: negative Cardiovascular: negative Gastrointestinal: Right groin bulge Genitourinary:negative Integument/Breast: negative Hematologic/Lymphatic: negative Behavioral/Psychiatric: negative Allergic/Immunologic: negative Physical Exam:  
 
Visit Vitals BP (!) 144/92 (BP 1 Location: Left arm, BP Patient Position: Sitting) Pulse 60 Temp 98 °F (36.7 °C) (Oral) Resp 18 Ht 5' 9\" (1.753 m) Wt 170 lb (77.1 kg) SpO2 98% BMI 25.10 kg/m² General - alert and oriented, no apparent distress HEENT - no jaundice, no hearing imparement Pulm - CTAB, no C/W/R 
CV - RRR, no M/R/G Abd -soft, nondistended, bowel sounds present, right inguinal hernia present soft small and reducible, possible weakness in left groin no obvious hernia but could have a small developing left inguinal hernia Ext - pulses intact in UE and LE bilaterally, no edema Skin - supple, no rashes Psychiatric - normal affect, good mood Labs Lab Results Component Value Date/Time Sodium 142 11/12/2019 08:30 AM  
 Potassium 4.4 11/12/2019 08:30 AM  
 Chloride 104 11/12/2019 08:30 AM  
 CO2 25 11/12/2019 08:30 AM  
 Anion gap 6 02/14/2018 05:06 PM  
 Glucose 89 11/12/2019 08:30 AM  
 BUN 13 11/12/2019 08:30 AM  
 Creatinine 1.08 11/12/2019 08:30 AM  
 BUN/Creatinine ratio 12 11/12/2019 08:30 AM  
 GFR est  06/20/2019 08:03 AM  
 GFR est non-AA 87 06/20/2019 08:03 AM  
 Calcium 9.0 11/12/2019 08:30 AM  
 Bilirubin, total 0.5 11/12/2019 08:30 AM  
 AST (SGOT) 24 11/12/2019 08:30 AM  
 Alk.  phosphatase 61 11/12/2019 08:30 AM  
 Protein, total 6.3 11/12/2019 08:30 AM  
 Albumin 3.9 11/12/2019 08:30 AM  
 Globulin 3.9 02/14/2018 05:06 PM  
 A-G Ratio 1.6 11/12/2019 08:30 AM  
 ALT (SGPT) 20 11/12/2019 08:30 AM  
 
Lab Results Component Value Date/Time WBC 7.0 11/12/2019 08:30 AM  
 HGB 15.9 11/12/2019 08:30 AM  
 HCT 47.0 11/12/2019 08:30 AM  
 PLATELET 679 97/64/2217 08:30 AM  
 MCV 91 11/12/2019 08:30 AM  
 
 
 
Imaging 
none I have reviewed and agree with all of the pertinent images Assessment:  
 
Melanie Mcdaniel is a 78 y.o. male with right inguinal hernia Recommendations: 1. He does have a small right inguinal hernia and will need laparoscopic right inguinal hernia repair with mesh. He has a slight weakness possibly on the left side could be a small hernia but we can determine at the time of surgery. He is not quite sure even if there was a tiny hernia there that he would want to have it fixed I will talk with him again prior to surgery. He has a history of a cystoscopy and bladder biopsy but has not had any major bladder surgery. We will proceed forward with laparoscopic right inguinal hernia repair with mesh. He is also been cleared by his cardiologist for surgery. I have discussed the above procedure with the patient in detail. We reviewed the benefits and possible complications of the surgery which include bleeding, infection, damage to adjacent organs, venous thromboembolism, need for repeat surgery, death and other unforseen complications. The patient agreed to proceed with the surgery. Ant Nieto MD 
 
Mr. Lock has a reminder for a \"due or due soon\" health maintenance. I have asked that he contact his primary care provider for follow-up on this health maintenance.

## 2020-05-05 NOTE — LETTER
5/5/20 Patient: Pramod Wyatt YOB: 1950 Date of Visit: 5/5/2020 Pj Shepard MD 
[address] [address] VIA In Basket Dear Pj Shepard MD, Thank you for referring Mr. Yolis Melendez to [facility] for evaluation. My notes for this consultation are attached. If you have questions, please do not hesitate to call me. I look forward to following your patient along with you. Sincerely, Satish Uribe MD

## 2020-05-05 NOTE — PROGRESS NOTES
1. Have you been to the ER, urgent care clinic since your last visit? Hospitalized since your last visit? No    2. Have you seen or consulted any other health care providers outside of the [address] [place] [address] since your last visit? Include any pap smears or colon screening.  No

## 2020-05-08 ENCOUNTER — HOSPITAL ENCOUNTER (OUTPATIENT)
Dept: LAB | Age: 70
Discharge: HOME | End: 2020-05-08
Payer: MEDICARE

## 2020-05-08 PROCEDURE — 36415 COLL VENOUS BLD VENIPUNCTURE: CPT

## 2020-05-08 PROCEDURE — 80061 LIPID PANEL: CPT

## 2020-05-08 PROCEDURE — 80053 COMPREHEN METABOLIC PANEL: CPT

## 2020-05-09 LAB
ALBUMIN SERPL-MCNC: 4.2 G/DL (ref 3.8–4.8)
ALBUMIN/GLOB SERPL: 1.8 {RATIO} (ref 1.2–2.2)
ALP SERPL-CCNC: 63 IU/L (ref 39–117)
ALT SERPL-CCNC: 21 IU/L (ref 0–44)
AST SERPL-CCNC: 27 IU/L (ref 0–40)
BILIRUB SERPL-MCNC: 0.5 MG/DL (ref 0–1.2)
BUN SERPL-MCNC: 13 MG/DL (ref 8–27)
BUN/CREAT SERPL: 14 (ref 10–24)
CALCIUM SERPL-MCNC: 9.6 MG/DL (ref 8.6–10.2)
CHLORIDE SERPL-SCNC: 103 MMOL/L (ref 96–106)
CHOLEST SERPL-MCNC: 128 MG/DL (ref 100–199)
CO2 SERPL-SCNC: 24 MMOL/L (ref 20–29)
CREAT SERPL-MCNC: 0.95 MG/DL (ref 0.76–1.27)
GLOBULIN SER CALC-MCNC: 2.4 G/DL (ref 1.5–4.5)
GLUCOSE SERPL-MCNC: 90 MG/DL (ref 65–99)
HDLC SERPL-MCNC: 49 MG/DL
INTERPRETATION, 910389: NORMAL
LDLC SERPL CALC-MCNC: 63 MG/DL (ref 0–99)
POTASSIUM SERPL-SCNC: 4.2 MMOL/L (ref 3.5–5.2)
PROT SERPL-MCNC: 6.6 G/DL (ref 6–8.5)
SODIUM SERPL-SCNC: 144 MMOL/L (ref 134–144)
TRIGL SERPL-MCNC: 78 MG/DL (ref 0–149)
VLDLC SERPL CALC-MCNC: 16 MG/DL (ref 5–40)

## 2020-05-11 DIAGNOSIS — E78.5 HYPERLIPIDEMIA WITH TARGET LDL LESS THAN 70: Primary | ICD-10-CM

## 2020-05-21 ENCOUNTER — HOSPITAL ENCOUNTER (OUTPATIENT)
Dept: PREADMISSION TESTING | Age: 70
Discharge: HOME | End: 2020-05-21
Payer: MEDICARE

## 2020-05-21 VITALS
DIASTOLIC BLOOD PRESSURE: 77 MMHG | TEMPERATURE: 98 F | SYSTOLIC BLOOD PRESSURE: 147 MMHG | HEART RATE: 65 BPM | BODY MASS INDEX: 25.03 KG/M2 | RESPIRATION RATE: 16 BRPM | WEIGHT: 169 LBS | HEIGHT: 69 IN

## 2020-05-21 LAB
BASOPHILS # BLD: 0.1 K/UL (ref 0–0.1)
BASOPHILS NFR BLD: 1 % (ref 0–1)
DIFFERENTIAL METHOD BLD: ABNORMAL
EOSINOPHIL # BLD: 0.1 K/UL (ref 0–0.4)
EOSINOPHIL NFR BLD: 2 % (ref 0–7)
ERYTHROCYTE [DISTWIDTH] IN BLOOD BY AUTOMATED COUNT: 12.9 % (ref 11.5–14.5)
HCT VFR BLD AUTO: 45 % (ref 36.6–50.3)
HGB BLD-MCNC: 14.4 G/DL (ref 12.1–17)
IMM GRANULOCYTES # BLD AUTO: 0 K/UL (ref 0–0.04)
IMM GRANULOCYTES NFR BLD AUTO: 0 % (ref 0–0.5)
LYMPHOCYTES # BLD: 1.5 K/UL (ref 0.8–3.5)
LYMPHOCYTES NFR BLD: 26 % (ref 12–49)
MCH RBC QN AUTO: 30.1 PG (ref 26–34)
MCHC RBC AUTO-ENTMCNC: 32 G/DL (ref 30–36.5)
MCV RBC AUTO: 93.9 FL (ref 80–99)
MONOCYTES # BLD: 0.8 K/UL (ref 0–1)
MONOCYTES NFR BLD: 14 % (ref 5–13)
NEUTS SEG # BLD: 3.4 K/UL (ref 1.8–8)
NEUTS SEG NFR BLD: 57 % (ref 32–75)
NRBC # BLD: 0 K/UL (ref 0–0.01)
NRBC BLD-RTO: 0 PER 100 WBC
PLATELET # BLD AUTO: 167 K/UL (ref 150–400)
PMV BLD AUTO: 10.9 FL (ref 8.9–12.9)
RBC # BLD AUTO: 4.79 M/UL (ref 4.1–5.7)
WBC # BLD AUTO: 5.8 K/UL (ref 4.1–11.1)

## 2020-05-21 PROCEDURE — 36415 COLL VENOUS BLD VENIPUNCTURE: CPT

## 2020-05-21 PROCEDURE — 85025 COMPLETE CBC W/AUTO DIFF WBC: CPT

## 2020-05-21 PROCEDURE — 93005 ELECTROCARDIOGRAM TRACING: CPT

## 2020-05-21 RX ORDER — IBUPROFEN 200 MG
600 CAPSULE ORAL
COMMUNITY

## 2020-05-21 RX ORDER — PHENOL/SODIUM PHENOLATE
20 AEROSOL, SPRAY (ML) MUCOUS MEMBRANE DAILY
COMMUNITY
End: 2021-05-03 | Stop reason: ALTCHOICE

## 2020-05-21 NOTE — PERIOP NOTES
DO NOT EAT OR DRINK ANYTHING AFTER MIDNIGHT, except as instructed THE NIGHT BEFORE SURGERY. PT GIVEN OPPORTUNITY TO ASK ADDITIONAL QUESTIONS. Patient given two bottles CHG soap and instruction sheet, instructions for use reviewed with patient. Patient given surgical site infection information FAQs handout and hand hygiene tips sheet. Pre-operative instructions reviewed and patient verbalizes understanding of instructions. Patient has been given the opportunity to ask additional questions. [facility] APPOINTMENTS REVIEWED AND GIVEN TO PATIENT, INCLUDING PHONE NUMBER TO REGISTRATION. COVID-19 INSTRUCTION SHEET ALSO REVIEWED AND GIVEN TO PATIENT.

## 2020-05-22 ENCOUNTER — TELEPHONE (OUTPATIENT)
Dept: CARDIOLOGY CLINIC | Age: 70
End: 2020-05-22

## 2020-05-22 LAB
ATRIAL RATE: 48 BPM
CALCULATED P AXIS, ECG09: 1 DEGREES
CALCULATED R AXIS, ECG10: 3 DEGREES
CALCULATED T AXIS, ECG11: 28 DEGREES
DIAGNOSIS, 93000: NORMAL
P-R INTERVAL, ECG05: 110 MS
Q-T INTERVAL, ECG07: 416 MS
QRS DURATION, ECG06: 96 MS
QTC CALCULATION (BEZET), ECG08: 371 MS
VENTRICULAR RATE, ECG03: 48 BPM

## 2020-05-22 NOTE — TELEPHONE ENCOUNTER
----- Message from Dorothea Sandy RN sent at 5/22/2020 11:54 AM EDT -----  Regarding: FW: Non-Urgent Medical Question  Contact: [phone]    ----- Message -----  From: Harry Perez  Sent: 5/22/2020  11:34 AM EDT  To: Maranda Gerber Pool  Subject: Non-Urgent Medical Question                      I received pre-admission instructions for the hernia repair procedure that is scheduled for June 4. I was advised to stop taking my daily aspirin 7 days before the procedure. However, I was instructed to check with you to verify that this was okay. Do you have any concern or objection to me stopping the aspirin for a week?   Thanks,  Alena Bales

## 2020-05-31 ENCOUNTER — HOSPITAL ENCOUNTER (OUTPATIENT)
Dept: PREADMISSION TESTING | Age: 70
Discharge: HOME | End: 2020-05-31
Payer: MEDICARE

## 2020-05-31 DIAGNOSIS — Z20.822 ENCOUNTER FOR LABORATORY TESTING FOR COVID-19 VIRUS: ICD-10-CM

## 2020-05-31 PROCEDURE — 87635 SARS-COV-2 COVID-19 AMP PRB: CPT

## 2020-06-01 LAB — SARS-COV-2, COV2NT: NOT DETECTED

## 2020-06-04 ENCOUNTER — ANESTHESIA (OUTPATIENT)
Dept: SURGERY | Age: 70
End: 2020-06-04
Payer: MEDICARE

## 2020-06-04 ENCOUNTER — HOSPITAL ENCOUNTER (OUTPATIENT)
Age: 70
Setting detail: OUTPATIENT SURGERY
Discharge: HOME | End: 2020-06-04
Attending: SURGERY | Admitting: SURGERY
Payer: MEDICARE

## 2020-06-04 ENCOUNTER — ANESTHESIA EVENT (OUTPATIENT)
Dept: SURGERY | Age: 70
End: 2020-06-04
Payer: MEDICARE

## 2020-06-04 VITALS
RESPIRATION RATE: 16 BRPM | BODY MASS INDEX: 25.03 KG/M2 | WEIGHT: 169 LBS | HEIGHT: 69 IN | DIASTOLIC BLOOD PRESSURE: 67 MMHG | HEART RATE: 58 BPM | SYSTOLIC BLOOD PRESSURE: 122 MMHG | TEMPERATURE: 97 F | OXYGEN SATURATION: 96 %

## 2020-06-04 DIAGNOSIS — K40.90 RIGHT INGUINAL HERNIA: Primary | ICD-10-CM

## 2020-06-04 PROCEDURE — 77030008756 HC TU IRR SUC STRY -B: Performed by: SURGERY

## 2020-06-04 PROCEDURE — 74011000258 HC RX REV CODE- 258: Performed by: NURSE ANESTHETIST, CERTIFIED REGISTERED

## 2020-06-04 PROCEDURE — 77030012770 HC TRCR OPT FX AMR -B: Performed by: SURGERY

## 2020-06-04 PROCEDURE — 77030031139 HC SUT VCRL2 J&J -A: Performed by: SURGERY

## 2020-06-04 PROCEDURE — 77030018836 HC SOL IRR NACL ICUM -A: Performed by: SURGERY

## 2020-06-04 PROCEDURE — 77030009964 HC RELD STPLR ENDOS COVD -B: Performed by: SURGERY

## 2020-06-04 PROCEDURE — 74011250636 HC RX REV CODE- 250/636: Performed by: SURGERY

## 2020-06-04 PROCEDURE — 76210000006 HC OR PH I REC 0.5 TO 1 HR: Performed by: SURGERY

## 2020-06-04 PROCEDURE — 74011250637 HC RX REV CODE- 250/637: Performed by: ANESTHESIOLOGY

## 2020-06-04 PROCEDURE — 77030002895 HC DEV VASC CLOSR COVD -B: Performed by: SURGERY

## 2020-06-04 PROCEDURE — 77030002933 HC SUT MCRYL J&J -A: Performed by: SURGERY

## 2020-06-04 PROCEDURE — 74011250636 HC RX REV CODE- 250/636: Performed by: NURSE ANESTHETIST, CERTIFIED REGISTERED

## 2020-06-04 PROCEDURE — 76210000020 HC REC RM PH II FIRST 0.5 HR: Performed by: SURGERY

## 2020-06-04 PROCEDURE — 77030010507 HC ADH SKN DERMBND J&J -B: Performed by: SURGERY

## 2020-06-04 PROCEDURE — 77030011640 HC PAD GRND REM COVD -A: Performed by: SURGERY

## 2020-06-04 PROCEDURE — 76010000149 HC OR TIME 1 TO 1.5 HR: Performed by: SURGERY

## 2020-06-04 PROCEDURE — 77030026438 HC STYL ET INTUB CARD -A: Performed by: NURSE ANESTHETIST, CERTIFIED REGISTERED

## 2020-06-04 PROCEDURE — 77030037032 HC INSRT SCIS CLICKLLINE DISP STOR -B: Performed by: SURGERY

## 2020-06-04 PROCEDURE — 77030020829: Performed by: SURGERY

## 2020-06-04 PROCEDURE — 77030040922 HC BLNKT HYPOTHRM STRY -A

## 2020-06-04 PROCEDURE — 74011250636 HC RX REV CODE- 250/636: Performed by: ANESTHESIOLOGY

## 2020-06-04 PROCEDURE — 77030008608 HC TRCR ENDOSC SMTH AMR -B: Performed by: SURGERY

## 2020-06-04 PROCEDURE — C1781 MESH (IMPLANTABLE): HCPCS | Performed by: SURGERY

## 2020-06-04 PROCEDURE — 74011000250 HC RX REV CODE- 250: Performed by: NURSE ANESTHETIST, CERTIFIED REGISTERED

## 2020-06-04 PROCEDURE — 76060000034 HC ANESTHESIA 1.5 TO 2 HR: Performed by: SURGERY

## 2020-06-04 PROCEDURE — 74011000250 HC RX REV CODE- 250: Performed by: SURGERY

## 2020-06-04 PROCEDURE — 77030008684 HC TU ET CUF COVD -B: Performed by: NURSE ANESTHETIST, CERTIFIED REGISTERED

## 2020-06-04 PROCEDURE — 77030040361 HC SLV COMPR DVT MDII -B: Performed by: SURGERY

## 2020-06-04 DEVICE — LAPAROSCOPIC SELF-FIXATING MESH, RIGHT ANATOMICAL
Type: IMPLANTABLE DEVICE | Site: ABDOMEN | Status: FUNCTIONAL
Brand: PROGRIP

## 2020-06-04 RX ORDER — LIDOCAINE HYDROCHLORIDE 20 MG/ML
INJECTION, SOLUTION EPIDURAL; INFILTRATION; INTRACAUDAL; PERINEURAL AS NEEDED
Status: DISCONTINUED | OUTPATIENT
Start: 2020-06-04 | End: 2020-06-04 | Stop reason: HOSPADM

## 2020-06-04 RX ORDER — GLYCOPYRROLATE 0.2 MG/ML
INJECTION INTRAMUSCULAR; INTRAVENOUS AS NEEDED
Status: DISCONTINUED | OUTPATIENT
Start: 2020-06-04 | End: 2020-06-04 | Stop reason: HOSPADM

## 2020-06-04 RX ORDER — CEFAZOLIN SODIUM/WATER 2 G/20 ML
2 SYRINGE (ML) INTRAVENOUS ONCE
Status: COMPLETED | OUTPATIENT
Start: 2020-06-04 | End: 2020-06-04

## 2020-06-04 RX ORDER — ROCURONIUM BROMIDE 10 MG/ML
INJECTION, SOLUTION INTRAVENOUS AS NEEDED
Status: DISCONTINUED | OUTPATIENT
Start: 2020-06-04 | End: 2020-06-04 | Stop reason: HOSPADM

## 2020-06-04 RX ORDER — MORPHINE SULFATE 4 MG/ML
INJECTION INTRAVENOUS AS NEEDED
Status: DISCONTINUED | OUTPATIENT
Start: 2020-06-04 | End: 2020-06-04 | Stop reason: HOSPADM

## 2020-06-04 RX ORDER — FENTANYL CITRATE 50 UG/ML
25 INJECTION, SOLUTION INTRAMUSCULAR; INTRAVENOUS
Status: DISCONTINUED | OUTPATIENT
Start: 2020-06-04 | End: 2020-06-04 | Stop reason: HOSPADM

## 2020-06-04 RX ORDER — SODIUM CHLORIDE 0.9 % (FLUSH) 0.9 %
5-40 SYRINGE (ML) INJECTION EVERY 8 HOURS
Status: DISCONTINUED | OUTPATIENT
Start: 2020-06-04 | End: 2020-06-04 | Stop reason: HOSPADM

## 2020-06-04 RX ORDER — SODIUM CHLORIDE 0.9 % (FLUSH) 0.9 %
5-40 SYRINGE (ML) INJECTION AS NEEDED
Status: DISCONTINUED | OUTPATIENT
Start: 2020-06-04 | End: 2020-06-04 | Stop reason: HOSPADM

## 2020-06-04 RX ORDER — EPHEDRINE SULFATE/0.9% NACL/PF 50 MG/5 ML
SYRINGE (ML) INTRAVENOUS AS NEEDED
Status: DISCONTINUED | OUTPATIENT
Start: 2020-06-04 | End: 2020-06-04 | Stop reason: HOSPADM

## 2020-06-04 RX ORDER — OXYCODONE AND ACETAMINOPHEN 5; 325 MG/1; MG/1
1 TABLET ORAL
Status: DISCONTINUED | OUTPATIENT
Start: 2020-06-04 | End: 2020-06-04 | Stop reason: HOSPADM

## 2020-06-04 RX ORDER — ONDANSETRON 2 MG/ML
4 INJECTION INTRAMUSCULAR; INTRAVENOUS AS NEEDED
Status: DISCONTINUED | OUTPATIENT
Start: 2020-06-04 | End: 2020-06-04 | Stop reason: HOSPADM

## 2020-06-04 RX ORDER — FENTANYL CITRATE 50 UG/ML
INJECTION, SOLUTION INTRAMUSCULAR; INTRAVENOUS AS NEEDED
Status: DISCONTINUED | OUTPATIENT
Start: 2020-06-04 | End: 2020-06-04 | Stop reason: HOSPADM

## 2020-06-04 RX ORDER — MIDAZOLAM HYDROCHLORIDE 1 MG/ML
0.5 INJECTION, SOLUTION INTRAMUSCULAR; INTRAVENOUS
Status: DISCONTINUED | OUTPATIENT
Start: 2020-06-04 | End: 2020-06-04 | Stop reason: HOSPADM

## 2020-06-04 RX ORDER — MIDAZOLAM HYDROCHLORIDE 1 MG/ML
1 INJECTION, SOLUTION INTRAMUSCULAR; INTRAVENOUS AS NEEDED
Status: DISCONTINUED | OUTPATIENT
Start: 2020-06-04 | End: 2020-06-04 | Stop reason: HOSPADM

## 2020-06-04 RX ORDER — PHENYLEPHRINE HCL IN 0.9% NACL 0.4MG/10ML
SYRINGE (ML) INTRAVENOUS AS NEEDED
Status: DISCONTINUED | OUTPATIENT
Start: 2020-06-04 | End: 2020-06-04 | Stop reason: HOSPADM

## 2020-06-04 RX ORDER — MORPHINE SULFATE 10 MG/ML
2 INJECTION, SOLUTION INTRAMUSCULAR; INTRAVENOUS
Status: DISCONTINUED | OUTPATIENT
Start: 2020-06-04 | End: 2020-06-04 | Stop reason: HOSPADM

## 2020-06-04 RX ORDER — BUPIVACAINE HYDROCHLORIDE AND EPINEPHRINE 5; 5 MG/ML; UG/ML
INJECTION, SOLUTION EPIDURAL; INTRACAUDAL; PERINEURAL AS NEEDED
Status: DISCONTINUED | OUTPATIENT
Start: 2020-06-04 | End: 2020-06-04 | Stop reason: HOSPADM

## 2020-06-04 RX ORDER — SODIUM CHLORIDE, SODIUM LACTATE, POTASSIUM CHLORIDE, CALCIUM CHLORIDE 600; 310; 30; 20 MG/100ML; MG/100ML; MG/100ML; MG/100ML
25 INJECTION, SOLUTION INTRAVENOUS CONTINUOUS
Status: DISCONTINUED | OUTPATIENT
Start: 2020-06-04 | End: 2020-06-04 | Stop reason: HOSPADM

## 2020-06-04 RX ORDER — HYDROMORPHONE HYDROCHLORIDE 1 MG/ML
0.2 INJECTION, SOLUTION INTRAMUSCULAR; INTRAVENOUS; SUBCUTANEOUS
Status: DISCONTINUED | OUTPATIENT
Start: 2020-06-04 | End: 2020-06-04 | Stop reason: HOSPADM

## 2020-06-04 RX ORDER — SODIUM CHLORIDE 9 MG/ML
25 INJECTION, SOLUTION INTRAVENOUS CONTINUOUS
Status: DISCONTINUED | OUTPATIENT
Start: 2020-06-04 | End: 2020-06-04 | Stop reason: HOSPADM

## 2020-06-04 RX ORDER — ACETAMINOPHEN 325 MG/1
650 TABLET ORAL ONCE
Status: COMPLETED | OUTPATIENT
Start: 2020-06-04 | End: 2020-06-04

## 2020-06-04 RX ORDER — DIPHENHYDRAMINE HYDROCHLORIDE 50 MG/ML
12.5 INJECTION, SOLUTION INTRAMUSCULAR; INTRAVENOUS AS NEEDED
Status: DISCONTINUED | OUTPATIENT
Start: 2020-06-04 | End: 2020-06-04 | Stop reason: HOSPADM

## 2020-06-04 RX ORDER — SUCCINYLCHOLINE CHLORIDE 20 MG/ML
INJECTION INTRAMUSCULAR; INTRAVENOUS AS NEEDED
Status: DISCONTINUED | OUTPATIENT
Start: 2020-06-04 | End: 2020-06-04 | Stop reason: HOSPADM

## 2020-06-04 RX ORDER — DEXAMETHASONE SODIUM PHOSPHATE 4 MG/ML
INJECTION, SOLUTION INTRA-ARTICULAR; INTRALESIONAL; INTRAMUSCULAR; INTRAVENOUS; SOFT TISSUE AS NEEDED
Status: DISCONTINUED | OUTPATIENT
Start: 2020-06-04 | End: 2020-06-04 | Stop reason: HOSPADM

## 2020-06-04 RX ORDER — NEOSTIGMINE METHYLSULFATE 1 MG/ML
INJECTION, SOLUTION INTRAVENOUS AS NEEDED
Status: DISCONTINUED | OUTPATIENT
Start: 2020-06-04 | End: 2020-06-04 | Stop reason: HOSPADM

## 2020-06-04 RX ORDER — ONDANSETRON 2 MG/ML
INJECTION INTRAMUSCULAR; INTRAVENOUS AS NEEDED
Status: DISCONTINUED | OUTPATIENT
Start: 2020-06-04 | End: 2020-06-04 | Stop reason: HOSPADM

## 2020-06-04 RX ORDER — KETOROLAC TROMETHAMINE 30 MG/ML
INJECTION, SOLUTION INTRAMUSCULAR; INTRAVENOUS AS NEEDED
Status: DISCONTINUED | OUTPATIENT
Start: 2020-06-04 | End: 2020-06-04 | Stop reason: HOSPADM

## 2020-06-04 RX ORDER — OXYCODONE HYDROCHLORIDE 5 MG/1
5 TABLET ORAL AS NEEDED
Status: DISCONTINUED | OUTPATIENT
Start: 2020-06-04 | End: 2020-06-04 | Stop reason: HOSPADM

## 2020-06-04 RX ORDER — OXYCODONE AND ACETAMINOPHEN 5; 325 MG/1; MG/1
1-2 TABLET ORAL
Qty: 30 TAB | Refills: 0 | Status: SHIPPED | OUTPATIENT
Start: 2020-06-04 | End: 2020-06-07

## 2020-06-04 RX ORDER — SODIUM CHLORIDE, SODIUM LACTATE, POTASSIUM CHLORIDE, CALCIUM CHLORIDE 600; 310; 30; 20 MG/100ML; MG/100ML; MG/100ML; MG/100ML
125 INJECTION, SOLUTION INTRAVENOUS CONTINUOUS
Status: DISCONTINUED | OUTPATIENT
Start: 2020-06-04 | End: 2020-06-04 | Stop reason: HOSPADM

## 2020-06-04 RX ORDER — FENTANYL CITRATE 50 UG/ML
50 INJECTION, SOLUTION INTRAMUSCULAR; INTRAVENOUS AS NEEDED
Status: DISCONTINUED | OUTPATIENT
Start: 2020-06-04 | End: 2020-06-04 | Stop reason: HOSPADM

## 2020-06-04 RX ORDER — LIDOCAINE HYDROCHLORIDE 10 MG/ML
0.1 INJECTION, SOLUTION EPIDURAL; INFILTRATION; INTRACAUDAL; PERINEURAL AS NEEDED
Status: DISCONTINUED | OUTPATIENT
Start: 2020-06-04 | End: 2020-06-04 | Stop reason: HOSPADM

## 2020-06-04 RX ORDER — PROPOFOL 10 MG/ML
INJECTION, EMULSION INTRAVENOUS AS NEEDED
Status: DISCONTINUED | OUTPATIENT
Start: 2020-06-04 | End: 2020-06-04 | Stop reason: HOSPADM

## 2020-06-04 RX ADMIN — LIDOCAINE HYDROCHLORIDE 100 MG: 20 INJECTION, SOLUTION EPIDURAL; INFILTRATION; INTRACAUDAL; PERINEURAL at 14:46

## 2020-06-04 RX ADMIN — Medication 2 G: at 14:58

## 2020-06-04 RX ADMIN — PHENYLEPHRINE HYDROCHLORIDE 10 MCG/MIN: 10 INJECTION INTRAVENOUS at 14:48

## 2020-06-04 RX ADMIN — ROCURONIUM BROMIDE 5 MG: 10 SOLUTION INTRAVENOUS at 14:46

## 2020-06-04 RX ADMIN — GLYCOPYRROLATE 0.4 MG: 0.2 INJECTION, SOLUTION INTRAMUSCULAR; INTRAVENOUS at 15:49

## 2020-06-04 RX ADMIN — FENTANYL CITRATE 25 MCG: 50 INJECTION, SOLUTION INTRAMUSCULAR; INTRAVENOUS at 14:39

## 2020-06-04 RX ADMIN — SUCCINYLCHOLINE CHLORIDE 160 MG: 20 INJECTION, SOLUTION INTRAMUSCULAR; INTRAVENOUS at 14:46

## 2020-06-04 RX ADMIN — Medication 3 MG: at 15:49

## 2020-06-04 RX ADMIN — Medication 10 MG: at 14:56

## 2020-06-04 RX ADMIN — PROPOFOL 125 MG: 10 INJECTION, EMULSION INTRAVENOUS at 14:46

## 2020-06-04 RX ADMIN — ROCURONIUM BROMIDE 35 MG: 10 SOLUTION INTRAVENOUS at 14:57

## 2020-06-04 RX ADMIN — Medication 40 MCG: at 14:47

## 2020-06-04 RX ADMIN — KETOROLAC TROMETHAMINE 15 MG: 30 INJECTION, SOLUTION INTRAMUSCULAR; INTRAVENOUS at 15:49

## 2020-06-04 RX ADMIN — SODIUM CHLORIDE, SODIUM LACTATE, POTASSIUM CHLORIDE, AND CALCIUM CHLORIDE 25 ML/HR: 600; 310; 30; 20 INJECTION, SOLUTION INTRAVENOUS at 13:34

## 2020-06-04 RX ADMIN — Medication 40 MCG: at 14:46

## 2020-06-04 RX ADMIN — DEXAMETHASONE SODIUM PHOSPHATE 4 MG: 4 INJECTION, SOLUTION INTRAMUSCULAR; INTRAVENOUS at 14:55

## 2020-06-04 RX ADMIN — ONDANSETRON HYDROCHLORIDE 4 MG: 2 INJECTION, SOLUTION INTRAMUSCULAR; INTRAVENOUS at 15:49

## 2020-06-04 RX ADMIN — OXYCODONE 5 MG: 5 TABLET ORAL at 16:45

## 2020-06-04 RX ADMIN — MORPHINE SULFATE 4 MG: 4 INJECTION INTRAVENOUS at 16:04

## 2020-06-04 RX ADMIN — FENTANYL CITRATE 50 MCG: 50 INJECTION, SOLUTION INTRAMUSCULAR; INTRAVENOUS at 15:16

## 2020-06-04 RX ADMIN — Medication 40 MCG: at 14:48

## 2020-06-04 RX ADMIN — ACETAMINOPHEN 650 MG: 325 TABLET, FILM COATED ORAL at 13:34

## 2020-06-04 RX ADMIN — FENTANYL CITRATE 25 MCG: 50 INJECTION, SOLUTION INTRAMUSCULAR; INTRAVENOUS at 14:46

## 2020-06-04 NOTE — INTERVAL H&P NOTE
Update History & Physical 
 
 
The plan was reviewed with the patient and I examined the patient. There was no change. The surgical site was confirmed by the patient and me. Plan:  The risk, benefits, expected outcome, and alternative to the recommended procedure have been discussed with the patient. Patient understands and wants to proceed with the procedure.  
 
Electronically signed by Jayna Perez MD on 6/4/2020 at 2:06 PM

## 2020-06-04 NOTE — PERIOP NOTES
Discharge instructions reviewed with patient and WIFE Shirley Blunt. Opportunity was given for patient and responsible party to ask questions BOTH VERBALIZE UNDERSTANDING. No further questions at this time. Responsible party verbalizes understanding and receipt of copy of discharge instructions.

## 2020-06-04 NOTE — ANESTHESIA PREPROCEDURE EVALUATION
Anesthetic History   No history of anesthetic complications            Review of Systems / Medical History  Patient summary reviewed, nursing notes reviewed and pertinent labs reviewed    Pulmonary  Within defined limits                 Neuro/Psych   Within defined limits           Cardiovascular    Hypertension          CAD, CABG and hyperlipidemia    Exercise tolerance: >4 METS     GI/Hepatic/Renal  Within defined limits              Endo/Other  Within defined limits           Other Findings              Physical Exam    Airway  Mallampati: II  TM Distance: > 6 cm  Neck ROM: normal range of motion   Mouth opening: Normal     Cardiovascular  Regular rate and rhythm,  S1 and S2 normal,  no murmur, click, rub, or gallop             Dental  No notable dental hx       Pulmonary  Breath sounds clear to auscultation               Abdominal  GI exam deferred       Other Findings            Anesthetic Plan    ASA: 3  Anesthesia type: general          Induction: Intravenous  Anesthetic plan and risks discussed with: Patient

## 2020-06-04 NOTE — ROUTINE PROCESS
Patient: Fareed Mendez MRN: [id number]  SSN: [id number] YOB: 1950  Age: 78 y.o. Sex: male Patient is status post Procedure(s): LAPAROSCOPIC RIGHT INGUINAL HERNIA REPAIR WITH MESH. Surgeon(s) and Role: 
   Gabby Adkins MD - Primary Local/Dose/Irrigation:  See [facility] Peripheral IV 06/04/20 Left Forearm (Active) Site Assessment Clean, dry, & intact 6/4/2020  1:32 PM  
Phlebitis Assessment 0 6/4/2020  1:32 PM  
Infiltration Assessment 0 6/4/2020  1:32 PM  
Dressing Status Clean, dry, & intact 6/4/2020  1:32 PM  
Dressing Type Transparent 6/4/2020  1:32 PM  
Hub Color/Line Status Green 6/4/2020  1:32 PM  
Alcohol Cap Used Yes 6/4/2020  1:32 PM  
                 
 
 
 
Dressing/Packing:  Wound Abdomen Trocar sites x3-Dressing Type: Topical skin adhesive/glue (06/04/20 1554) Splint/Cast:  ] Other:

## 2020-06-04 NOTE — BRIEF OP NOTE
Brief Postoperative Note    Patient: Wendy Mann  YOB: 1950  MRN: [id number]    Date of Procedure: 6/4/2020     Pre-Op Diagnosis: RIGHT INGUINAL HERNIA    Post-Op Diagnosis: Same as preoperative diagnosis. Procedure(s):  LAPAROSCOPIC RIGHT INGUINAL HERNIA REPAIR WITH MESH    Surgeon(s):  Claudina Osler, MD    Surgical Assistant: Yayo Edward    Anesthesia: General     Estimated Blood Loss (mL): Minimal    Complications: None    Specimens: * No specimens in log *     Implants:   Implant Name Type Inv.  Item Serial No.  Lot No. LRB No. Used Action   MESH SLF-FLT PROGRIP RT -- PROGRIP - SN/A  MESH SLF-FLT PROGRIP RT -- PROGRIP N/A [facility] SURGICAL [id number] Right 1 Implanted       Drains: * No LDAs found *    Findings: direct right inguinal hernia repaired with a [id number] Parietex Progrip mesh preperitoneal    Electronically Signed by Floyd Primrose, MD on 6/4/2020 at 3:53 PM

## 2020-06-04 NOTE — DISCHARGE INSTRUCTIONS
YOU HAD ONE PAIN PILL (OXYCODONE) IN THE RECOVERY AREA AT 4:45PM    Inguinal Hernia Repair      Patient Discharge Instructions    Ezra Dubose / [id number] : 1950    Admitted 2020 Discharged: 2020     · It is important that you take the medication exactly as they are prescribed. · Keep your medication in the bottles provided by the pharmacist and keep a list of the medication names, dosages, and times to be taken in your wallet. · Do not take other medications without consulting your doctor. · Wound care: You may shower starting tomorrow. Do not remove the dermabond on the incision, it will fall of on its own in a few weeks. · No heavy lifting or strenuous activity for 2wks after the operation    Take ibuprofen (Motrin) as scheduled then combine with oxycodone/acetaminophen (Percocet, Roxicet, Tylox) as needed for severe pain. What to do at Home    See instructions below. Follow-up with Dr. Karla Linton in 2 week(s). Call the office ([phone]) to schedule your appointment. Information obtained by :  I understand that if any problems occur once I am at home I am to contact my physician. I understand and acknowledge receipt of the instructions indicated above. [de-identified] or R.N.'s Signature                                                                  Date/Time                                                                                                                                              Patient or Representative Signature                                                          Date/Time     Inguinal Hernia Repair: What to Expect at [address] are likely to have pain for the next few days. You may also feel like you have the flu, and you may have a low fever and feel tired and nauseated. This is common.   You should feel better after a few days and will probably feel much better in 7 days. For several weeks you may feel twinges or pulling in the hernia repair when you move. You may have some bruising on the scrotum and along the penis. This is normal. Men will need to wear a jockstrap or briefs, not boxers, for scrotal support for several days after a groin (inguinal) hernia repair. [employer] bicycle shorts may provide good support. This care sheet gives you a general idea about how long it will take for you to recover. But each person recovers at a different pace. Follow the steps below to get better as quickly as possible. How can you care for yourself at home? Activity  · Rest when you feel tired. Getting enough sleep will help you recover. Sleep with your head up by using three or four pillows. You can also try to sleep with your head up in a recliner chair. Do not sleep on your side or stomach. · Try to walk each day. Start by walking a little more than you did the day before. Bit by bit, increase the amount you walk. Walking boosts blood flow and helps prevent pneumonia and constipation. · Put ice or a cold pack on the area of your hernia repair for 10 to 20 minutes at a time. Try to do this every 1 to 2 hours for the first 24 hours (when you are awake) or until the swelling goes down. Put a thin cloth between the ice and your skin. · Avoid strenuous activities, such as biking, jogging, weight lifting, or aerobic exercise, until your doctor says it is okay. · Avoid lifting anything that would make you strain. This may include heavy grocery bags and milk containers, a heavy briefcase or backpack, cat litter or dog food bags, a vacuum , or a child. · You may drive when you are no longer taking pain medicine and can quickly move your foot from the gas pedal to the brake. You must also be able to sit comfortably for a long period of time, even if you do not plan to go far.  You might get caught in traffic. · Most people are able to return to work within 1 to 2 weeks after surgery. · You may shower 24 to 48 hours after surgery, if your doctor okays it. Pat the cut (incision) dry. Do not take a bath for the first 2 weeks, or until your doctor tells you it is okay. · Your doctor will tell you when you can have sex again. Diet  · You can eat your normal diet. If your stomach is upset, try bland, low-fat foods like plain rice, broiled chicken, toast, and yogurt. · Drink plenty of fluids (unless your doctor tells you not to). · You may notice that your bowel movements are not regular right after your surgery. This is common. Avoid constipation and straining with bowel movements. You may want to take a fiber supplement every day. If you have not had a bowel movement after a couple of days, ask your doctor about taking a mild laxative. Medicines  · Take pain medicines exactly as directed. ¨ If the doctor gave you a prescription medicine for pain, take it as prescribed. ¨ If you are not taking a prescription pain medicine, take an over-the-counter medicine such as acetaminophen (Tylenol), ibuprofen (Advil, Motrin), or naproxen (Aleve). Read and follow all instructions on the label. ¨ Do not take two or more pain medicines at the same time unless the doctor told you to. Many pain medicines have acetaminophen, which is Tylenol. Too much acetaminophen (Tylenol) can be harmful. · If your doctor prescribed antibiotics, take them as directed. Do not stop taking them just because you feel better. You need to take the full course of antibiotics. · If you think your pain medicine is making you sick to your stomach:  ¨ Take your medicine after meals (unless your doctor has told you not to). ¨ Ask your doctor for a different pain medicine. Incision care  · Wash the area daily with warm, soapy water and pat it dry. Follow-up care is a key part of your treatment and safety.  Be sure to make and go to all appointments, and call your doctor if you are having problems. It's also a good idea to know your test results and keep a list of the medicines you take. When should you call for help? Call 911 anytime you think you may need emergency care. For example, call if:  · You passed out (lost consciousness). · You have sudden chest pain and shortness of breath, or you cough up blood. · You have severe pain in your belly. Call your doctor now or seek immediate medical care if:  · You are sick to your stomach and cannot keep fluids down. · You have signs of a blood clot, such as:  ¨ Pain in your calf, back of the knee, thigh, or groin. ¨ Redness and swelling in your leg or groin. · You have trouble passing urine or stool, especially if you have mild pain or swelling in your lower belly. · Bright red blood has soaked through the bandage over your incision. Watch closely for any changes in your health, and be sure to contact your doctor if:  · Your swelling is getting worse. · Your swelling is not going down. Where can you learn more? Go to [employer]  Enter A599 in the search box to learn more about \"Hernia Repair: What to Expect at Home. \"   © 3286-6542 Healthwise, Incorporated. Care instructions adapted under license by [employer] (which disclaims liability or warranty for this information). This care instruction is for use with your licensed healthcare professional. If you have questions about a medical condition or this instruction, always ask your healthcare professional. [place] [address] any warranty or liability for your use of this information. Content Version: 3.4.51826;  Last Revised: January 21, 2011    ______________________________________________________________________    Anesthesia Discharge Instructions    After general anesthesia or intervenous sedation, for 24 hours or while taking prescription Narcotics:  · Limit your activities  · Do not drive or operate hazardous machinery  · If you have not urinated within 8 hours after discharge, please contact your surgeon on call. · Do not make important personal or business decisions  · Do not drink alcoholic beverages    Report the following to your surgeon:  · Excessive pain, swelling, redness or odor of or around the surgical area  · Temperature over 100.5 degrees  · Nausea and vomiting lasting longer than 4 hours or if unable to take medication  · Any signs of decreased circulation or nerve impairment to extremity:  Change in color, persistent numbness, tingling, coldness or increased pain. · Any questions      Patient Education   Learning About Coronavirus [phone])  Coronavirus [phone]): Overview  What is coronavirus (ESGOX-46)? The coronavirus disease (COVID-19) is caused by a virus. It is an illness that was first found in [place], [place], in December 2019. It has since spread worldwide. The virus can cause fever, cough, and trouble breathing. In severe cases, it can cause pneumonia and make it hard to breathe without help. It can cause death. Coronaviruses are a large group of viruses. They cause the common cold. They also cause more serious illnesses like Middle East respiratory syndrome (MERS) and severe acute respiratory syndrome (SARS). COVID-19 is caused by a novel coronavirus. That means it's a new type that has not been seen in people before. This virus spreads person-to-person through droplets from coughing and sneezing. It can also spread when you are close to someone who is infected. And it can spread when you touch something that has the virus on it, such as a doorknob or a tabletop. What can you do to protect yourself from coronavirus (COVID-19)? The best way to protect yourself from getting sick is to:  · Avoid areas where there is an outbreak. · Avoid contact with people who may be infected. · Wash your hands often with soap or alcohol-based hand sanitizers.   · Avoid crowds and try to stay at least 6 feet away from other people. · Wash your hands often, especially after you cough or sneeze. Use soap and water, and scrub for at least 20 seconds. If soap and water aren't available, use an alcohol-based hand . · Avoid touching your mouth, nose, and eyes. What can you do to avoid spreading the virus to others? To help avoid spreading the virus to others:  · Cover your mouth with a tissue when you cough or sneeze. Then throw the tissue in the trash. · Use a disinfectant to clean things that you touch often. · Stay home if you are sick or have been exposed to the virus. Don't go to school, work, or public areas. And don't use public transportation. · If you are sick:  ? Leave your home only if you need to get medical care. But call the doctor's office first so they know you're coming. And wear a face mask, if you have one.  ? If you have a face mask, wear it whenever you're around other people. It can help stop the spread of the virus when you cough or sneeze. ? Clean and disinfect your home every day. Use household  and disinfectant wipes or sprays. Take special care to clean things that you grab with your hands. These include doorknobs, remote controls, phones, and handles on your refrigerator and microwave. And don't forget countertops, tabletops, bathrooms, and computer keyboards. When to call for help  Call 911 anytime you think you may need emergency care. For example, call if:  · You have severe trouble breathing. (You can't talk at all.)  · You have constant chest pain or pressure. · You are severely dizzy or lightheaded. · You are confused or can't think clearly. · Your face and lips have a blue color. · You pass out (lose consciousness) or are very hard to wake up. Call your doctor now if you develop symptoms such as:  · Shortness of breath. · Fever. · Cough. If you need to get care, call ahead to the doctor's office for instructions before you go.  Make sure you wear a face mask, if you have one, to prevent exposing other people to the virus. Where can you get the latest information? The following health organizations are tracking and studying this virus. Their websites contain the most up-to-date information. Jamir Barros also learn what to do if you think you may have been exposed to the virus. · U.S. Centers for Disease Control and Prevention (CDC): The CDC provides updated news about the disease and travel advice. The website also tells you how to prevent the spread of infection. www.cdc.gov  · World Health Organization [facility]): WHO offers information about the virus outbreaks. WHO also has travel advice. www.who.int  Current as of: April 1, 2020               Content Version: 12.4  © 2006-2020 Healthwise, Incorporated. Care instructions adapted under license by your healthcare professional. If you have questions about a medical condition or this instruction, always ask your healthcare professional. [address] any warranty or liability for your use of this information.

## 2020-06-05 NOTE — ANESTHESIA POSTPROCEDURE EVALUATION
Post-Anesthesia Evaluation and Assessment    Patient: Bang Torres MRN: [id number]  SSN: [id number]    YOB: 1950  Age: 78 y.o. Sex: male       Cardiovascular Function/Vital Signs  Visit Vitals  /67   Pulse (!) 58   Temp 36.1 °C (97 °F)   Resp 16   Ht 5' 9\" (1.753 m)   Wt 76.7 kg (169 lb)   SpO2 96%   BMI 24.96 kg/m²       Patient is status post General anesthesia for Procedure(s):  LAPAROSCOPIC RIGHT INGUINAL HERNIA REPAIR WITH MESH. Nausea/Vomiting: None    Postoperative hydration reviewed and adequate. Pain:  Pain Scale 1: Numeric (0 - 10) (06/04/20 1630)  Pain Intensity 1: 0 (06/04/20 1630)   Managed    Neurological Status:   Neuro (WDL): Exceptions to WDL (06/04/20 1630)  Neuro  Neurologic State: Alert (06/04/20 1630)  Orientation Level: Oriented X4 (06/04/20 1630)  Cognition: Follows commands (06/04/20 1630)  LUE Motor Response: Purposeful (06/04/20 1630)  LLE Motor Response: Purposeful (06/04/20 1630)  RUE Motor Response: Purposeful (06/04/20 1630)  RLE Motor Response: Purposeful (06/04/20 1630)   At baseline    Mental Status and Level of Consciousness: Alert and oriented to person, place, and time    Pulmonary Status:   O2 Device: Room air (06/04/20 1655)   Adequate oxygenation and airway patent    Complications related to anesthesia: None    Post-anesthesia assessment completed. No concerns    Signed By: Camille Opitz, MD     June 5, 2020              Procedure(s):  LAPAROSCOPIC RIGHT INGUINAL HERNIA REPAIR WITH MESH. general    <BSHSIANPOST>    INITIAL Post-op Vital signs:   Vitals Value Taken Time   /67 6/4/2020  4:45 PM   Temp 36.1 °C (97 °F) 6/4/2020  4:08 PM   Pulse 56 6/4/2020  5:00 PM   Resp 18 6/4/2020  5:00 PM   SpO2 96 % 6/4/2020  5:00 PM   Vitals shown include unvalidated device data.

## 2020-06-05 NOTE — OP NOTES
[address]   OPERATIVE REPORT    Name:  Kristie Calvin  MR#:  [id number]  :  1950  ACCOUNT #:  [de-identified]  DATE OF SERVICE:  2020    PREOPERATIVE DIAGNOSIS:  Right inguinal hernia. POSTOPERATIVE DIAGNOSIS:  Right inguinal hernia. PROCEDURE PERFORMED:  Laparoscopic right inguinal hernia repair with mesh. SURGEON:  Sarah Willis MD    ASSISTANT:  Jonny May SA    ANESTHESIA:  General.    COMPLICATIONS:  None. SPECIMENS REMOVED:  None. IMPLANTS:  15 x 10 cm Parietex ProGrip mesh, right-sided. ESTIMATED BLOOD LOSS:  Minimal.    INDICATION FOR THE OPERATION:  The patient is a 65-year-old male who has a symptomatic right inguinal hernia that is needing repair with mesh in the operating room. DESCRIPTION OF THE OPERATION:  The patient was met in the preop holding area. The H and P was updated. Consent was signed. All risks and benefits were explained to the patient prior to start of the operation. He was taken back to the operating room. He was lying in a supine position. The abdomen was prepped and draped in standard sterile fashion. Time-out was called. The antibiotics were given. The SCDs were on lower extremities. We started the operation by making an infraumbilical incision dissecting down the umbilical stalk, grasping with the Kocher clamp, making a small incision into the infraumbilical fascia and then bluntly dissecting into the peritoneal cavity, placing a 5-mm trocar, and insufflating to 15 mmHg. We then placed a 5-mm right-sided trocar, 5-mm left-sided trocar and then switched out to a 58-ZC periumbilical trocar. We then looked down into the right lower quadrant finding a right-sided inguinal hernia. The left side had no hernia, so we started our dissection into the preperitoneal plane going from medial to lateral in the right lower quadrant.   We dissected down to the pubic bone inferiorly in the preperitoneal space, down to the hernia sac and into the lateral abdominal wall. We then reduced the hernia sac from the direct space. The patient had a small direct inguinal hernia. We dissected down below the pubic bone, and we dissected the peritoneum down from the testicular vessels and vas deferens, which were all identified and preserved. We dissected down to allow for our mesh to unfold completely and go up against the abdominal wall. We then used a 15 x 10 cm Parietex ProGrip mesh placing into the preperitoneal plane, centering it over our hernia defect and then tacking it down to the pubic bone inferiorly with a tacking device, to the anterior abdominal wall superiorly and laterally. We then had the mesh adherent to the anterior abdominal wall. We then tacked the peritoneum back up to the anterior abdominal wall with a tacking device, and then closed a small hole in the peritoneum with the stapler device. We then had desufflated the abdominal cavity, removed the trocars and closed the skin with 4-0 Monocryl and Dermabond to complete the operation. Dr. Bridgette Carrera was present and scrubbed during the entire operation. The counts were correct.       MD YOAV [email or web address]  D:  06/04/2020 15:59  T:  06/04/2020 23:23  JOB #:  [id number]

## 2020-06-08 ENCOUNTER — TELEPHONE (OUTPATIENT)
Dept: SURGERY | Age: 70
End: 2020-06-08

## 2020-06-22 ENCOUNTER — OFFICE VISIT (OUTPATIENT)
Dept: SURGERY | Age: 70
End: 2020-06-22

## 2020-06-22 VITALS
HEIGHT: 69 IN | HEART RATE: 65 BPM | WEIGHT: 168.2 LBS | OXYGEN SATURATION: 96 % | TEMPERATURE: 98.6 F | BODY MASS INDEX: 24.91 KG/M2 | DIASTOLIC BLOOD PRESSURE: 72 MMHG | SYSTOLIC BLOOD PRESSURE: 119 MMHG | RESPIRATION RATE: 17 BRPM

## 2020-06-22 DIAGNOSIS — Z98.890 S/P RIGHT INGUINAL HERNIORRHAPHY: ICD-10-CM

## 2020-06-22 DIAGNOSIS — Z09 FOLLOW-UP EXAMINATION AFTER ABDOMINAL SURGERY: Primary | ICD-10-CM

## 2020-06-22 DIAGNOSIS — Z87.19 S/P RIGHT INGUINAL HERNIORRHAPHY: ICD-10-CM

## 2020-06-22 NOTE — PROGRESS NOTES
1. Have you been to the ER, urgent care clinic since your last visit? Hospitalized since your last visit? No    2. Have you seen or consulted any other health care providers outside of the [address] [place] [address] since your last visit? Include any pap smears or colon screening.  No

## 2020-06-22 NOTE — PROGRESS NOTES
Chief Complaint   Patient presents with    Post OP Follow Up     2 weeks s/p hernia      Jane Barroso presents 2 weeks s/p laparoscopic right inguinal hernia repair with mesh. He is doing well     Review of Systems   Constitutional: Negative for chills, fever and malaise/fatigue. Respiratory: Negative for cough and shortness of breath. Cardiovascular: Negative for chest pain, palpitations and leg swelling. Gastrointestinal: Positive for abdominal pain (had episode of sharp pain \"like I pulled something\" with sneezing ). Negative for blood in stool, constipation, diarrhea, heartburn, nausea and vomiting. Genitourinary: Negative for dysuria, frequency and urgency. Musculoskeletal:        Walking every day for exercise    Skin: Negative for itching and rash. Visit Vitals  /72 (BP 1 Location: Left arm, BP Patient Position: Sitting)   Pulse 65   Temp 98.6 °F (37 °C) (Oral)   Resp 17   Ht 5' 9\" (1.753 m)   Wt 168 lb 3.2 oz (76.3 kg)   SpO2 96%   BMI 24.84 kg/m²     A + O x 3, appears well   Chest  Clear, unlabored   COR  Regular   ABD Soft, lap sites C/D/I and no erythema or induration, NT/ND, +BS, no masses or hernias. EXT No edema; ambulating independently      ICD-10-CM ICD-9-CM    1. Follow-up examination after abdominal surgery Z09 V67.09    2. S/P right inguinal herniorrhaphy Z98.890 V45.89     Z87.19       Doing well 2 weeks s/p laparoscopic right inguinal hernia repair   Diet as desired   Activity reviewed and continue to avoid lifting and straining .  15 lbs  for another month   No abdominal exercises x another 4 weeks   Walking, pool are ok   Doing well and discharged from surgical care with PRN follow up

## 2020-06-22 NOTE — PATIENT INSTRUCTIONS
Recommendations after abdominal surgery:    -  Avoid heavy lifting and or straining anything > about 15 lbs for another 4 weeks     -  Avoid abdominal exercises for another 4 weeks     -  Wait another 2 weeks before you bike and then see how you feel     -  Daily walking and resuming your normal day to day activities is encouraged and as tolerated      -  Ok to bath as normal and you may get in a swimming pool     -  Use sun block on exposed skin and ok to moisturize the incisions as desired

## 2020-11-04 DIAGNOSIS — I25.10 CORONARY ARTERY DISEASE INVOLVING NATIVE CORONARY ARTERY OF NATIVE HEART WITHOUT ANGINA PECTORIS: ICD-10-CM

## 2020-11-04 DIAGNOSIS — I10 ESSENTIAL HYPERTENSION: ICD-10-CM

## 2020-11-04 RX ORDER — ATENOLOL 50 MG/1
TABLET ORAL
Qty: 90 TAB | Refills: 3 | Status: SHIPPED | OUTPATIENT
Start: 2020-11-04 | End: 2021-01-14 | Stop reason: SDUPTHER

## 2020-11-13 ENCOUNTER — OFFICE VISIT (OUTPATIENT)
Dept: INTERNAL MEDICINE CLINIC | Age: 70
End: 2020-11-13
Payer: MEDICARE

## 2020-11-13 ENCOUNTER — HOSPITAL ENCOUNTER (OUTPATIENT)
Dept: LAB | Age: 70
Discharge: HOME | End: 2020-11-13
Payer: MEDICARE

## 2020-11-13 VITALS
TEMPERATURE: 99 F | RESPIRATION RATE: 18 BRPM | HEIGHT: 69 IN | HEART RATE: 68 BPM | BODY MASS INDEX: 25.42 KG/M2 | SYSTOLIC BLOOD PRESSURE: 130 MMHG | OXYGEN SATURATION: 96 % | WEIGHT: 171.6 LBS | DIASTOLIC BLOOD PRESSURE: 90 MMHG

## 2020-11-13 DIAGNOSIS — Z13.6 SCREENING FOR AAA (ABDOMINAL AORTIC ANEURYSM): ICD-10-CM

## 2020-11-13 DIAGNOSIS — Z00.00 MEDICARE ANNUAL WELLNESS VISIT, SUBSEQUENT: Primary | ICD-10-CM

## 2020-11-13 DIAGNOSIS — E55.9 VITAMIN D DEFICIENCY: ICD-10-CM

## 2020-11-13 DIAGNOSIS — I25.10 CORONARY ARTERY DISEASE INVOLVING NATIVE CORONARY ARTERY OF NATIVE HEART WITHOUT ANGINA PECTORIS: ICD-10-CM

## 2020-11-13 DIAGNOSIS — Z12.5 ENCOUNTER FOR SCREENING FOR MALIGNANT NEOPLASM OF PROSTATE: ICD-10-CM

## 2020-11-13 DIAGNOSIS — E78.5 HYPERLIPIDEMIA, UNSPECIFIED HYPERLIPIDEMIA TYPE: ICD-10-CM

## 2020-11-13 DIAGNOSIS — I10 ESSENTIAL HYPERTENSION: ICD-10-CM

## 2020-11-13 DIAGNOSIS — R73.02 IGT (IMPAIRED GLUCOSE TOLERANCE): ICD-10-CM

## 2020-11-13 DIAGNOSIS — N40.0 BENIGN PROSTATIC HYPERPLASIA WITHOUT LOWER URINARY TRACT SYMPTOMS: ICD-10-CM

## 2020-11-13 PROCEDURE — 3017F COLORECTAL CA SCREEN DOC REV: CPT | Performed by: INTERNAL MEDICINE

## 2020-11-13 PROCEDURE — 83036 HEMOGLOBIN GLYCOSYLATED A1C: CPT

## 2020-11-13 PROCEDURE — G8755 DIAS BP > OR = 90: HCPCS | Performed by: INTERNAL MEDICINE

## 2020-11-13 PROCEDURE — G0439 PPPS, SUBSEQ VISIT: HCPCS | Performed by: INTERNAL MEDICINE

## 2020-11-13 PROCEDURE — G8752 SYS BP LESS 140: HCPCS | Performed by: INTERNAL MEDICINE

## 2020-11-13 PROCEDURE — 99214 OFFICE O/P EST MOD 30 MIN: CPT | Performed by: INTERNAL MEDICINE

## 2020-11-13 PROCEDURE — G8536 NO DOC ELDER MAL SCRN: HCPCS | Performed by: INTERNAL MEDICINE

## 2020-11-13 PROCEDURE — 82550 ASSAY OF CK (CPK): CPT

## 2020-11-13 PROCEDURE — G8419 CALC BMI OUT NRM PARAM NOF/U: HCPCS | Performed by: INTERNAL MEDICINE

## 2020-11-13 PROCEDURE — 82306 VITAMIN D 25 HYDROXY: CPT

## 2020-11-13 PROCEDURE — G8510 SCR DEP NEG, NO PLAN REQD: HCPCS | Performed by: INTERNAL MEDICINE

## 2020-11-13 PROCEDURE — 84153 ASSAY OF PSA TOTAL: CPT

## 2020-11-13 PROCEDURE — 85025 COMPLETE CBC W/AUTO DIFF WBC: CPT

## 2020-11-13 PROCEDURE — 80053 COMPREHEN METABOLIC PANEL: CPT

## 2020-11-13 PROCEDURE — 1101F PT FALLS ASSESS-DOCD LE1/YR: CPT | Performed by: INTERNAL MEDICINE

## 2020-11-13 PROCEDURE — G8427 DOCREV CUR MEDS BY ELIG CLIN: HCPCS | Performed by: INTERNAL MEDICINE

## 2020-11-13 PROCEDURE — 80061 LIPID PANEL: CPT

## 2020-11-13 PROCEDURE — G0463 HOSPITAL OUTPT CLINIC VISIT: HCPCS | Performed by: INTERNAL MEDICINE

## 2020-11-13 NOTE — PATIENT INSTRUCTIONS
Medicare Wellness Visit, Male The best way to live healthy is to have a lifestyle where you eat a well-balanced diet, exercise regularly, limit alcohol use, and quit all forms of tobacco/nicotine, if applicable. Regular preventive services are another way to keep healthy. Preventive services (vaccines, screening tests, monitoring & exams) can help personalize your care plan, which helps you manage your own care. Screening tests can find health problems at the earliest stages, when they are easiest to treat. [facility] follows the current, evidence-based guidelines published by the [facility] Yair Hafsa ([facility]) when recommending preventive services for our patients. Because we follow these guidelines, sometimes recommendations change over time as research supports it. (For example, a prostate screening blood test is no longer routinely recommended for men with no symptoms). Of course, you and your doctor may decide to screen more often for some diseases, based on your risk and co-morbidities (chronic disease you are already diagnosed with). Preventive services for you include: - Medicare offers their members a free annual wellness visit, which is time for you and your primary care provider to discuss and plan for your preventive service needs. Take advantage of this benefit every year! 
-All adults over age 72 should receive the recommended pneumonia vaccines. Current USPSTF guidelines recommend a series of two vaccines for the best pneumonia protection.  
-All adults should have a flu vaccine yearly and tetanus vaccine every 10 years. 
-All adults age 48 and older should receive the shingles vaccines (series of two vaccines).       
-All adults age 38-68 who are overweight should have a diabetes screening test once every three years.  
-Other screening tests & preventive services for persons with diabetes include: an eye exam to screen for diabetic retinopathy, a kidney function test, a foot exam, and stricter control over your cholesterol.  
-Cardiovascular screening for adults with routine risk involves an electrocardiogram (ECG) at intervals determined by the provider.  
-Colorectal cancer screening should be done for adults age 54-65 with no increased risk factors for colorectal cancer. There are a number of acceptable methods of screening for this type of cancer. Each test has its own benefits and drawbacks. Discuss with your provider what is most appropriate for you during your annual wellness visit. The different tests include: colonoscopy (considered the best screening method), a fecal occult blood test, a fecal DNA test, and sigmoidoscopy. 
-All adults born between [facility] should be screened once for Hepatitis C. 
-An Abdominal Aortic Aneurysm (AAA) Screening is recommended for men age 73-68 who has ever smoked in their lifetime. Here is a list of your current Health Maintenance items (your personalized list of preventive services) with a due date: 
Health Maintenance Due Topic Date Due  
 AAA Screening  09/05/2014  Yearly Flu Vaccine (1) 09/01/2020 [facility] Annual Well Visit  11/18/2020

## 2020-11-13 NOTE — PROGRESS NOTES
HPI:  Presents for f/u lipids, IGT, HTN    Taking and tolerating meds    No CP, SOB, neuro sx    Pt acknowledges diet and exercise not as good through pandemic  Pt had usually gone to the gym regularly. Pt more inclined to have drink - 5 nights per week rather than prior 2 nights per week. Past medical, Social, and Family history reviewed    Prior to Admission medications    Medication Sig Start Date End Date Taking? Authorizing Provider   atenoloL (TENORMIN) 50 mg tablet TAKE 1 TABLET EVERY DAY 11/4/20  Yes Indira Baltazar MD   ibuprofen 200 mg cap Take 600 mg by mouth daily as needed for Pain. Yes Provider, Historical   atorvastatin (LIPITOR) 80 mg tablet TAKE 1 TABLET EVERY DAY 1/27/20  Yes Radha Sheriff MD   calcium citrate/vitamin D3 (CITRACAL + D PO) Take 1 Tab by mouth daily. Yes Provider, Historical   VITAMIN B COMPLEX (B COMPLEX PO) Take 1 Tab by mouth daily. Yes Provider, Historical   MULTIVITAMIN PO Take 1 Tab by mouth daily. Yes Provider, Historical   FOLIC ACID PO Take 5 mg by mouth daily. Yes Provider, Historical   coenzyme Q-10 (CO Q-10) 200 mg capsule Take 1 Cap by mouth daily. 10/10/12  Yes Indira Baltazar MD   aspirin (ASPIRIN) 325 mg tablet Take 325 mg by mouth daily. Yes Provider, Historical   Omeprazole delayed release (PRILOSEC D/R) 20 mg tablet Take 20 mg by mouth daily. Provider, Historical          ROS  Complete ROS reviewed and negative or stable except as noted in HPI. Physical Exam  Vitals signs and nursing note reviewed. Constitutional:       General: He is not in acute distress. HENT:      Head: Normocephalic and atraumatic. Mouth/Throat:      Pharynx: No oropharyngeal exudate. Eyes:      General: No scleral icterus. Pupils: Pupils are equal, round, and reactive to light. Neck:      Musculoskeletal: Normal range of motion and neck supple. Thyroid: No thyromegaly. Vascular: No JVD.    Cardiovascular:      Rate and Rhythm: Normal rate and regular rhythm. Heart sounds: Normal heart sounds. No murmur. No friction rub. No gallop. Pulmonary:      Effort: Pulmonary effort is normal. No respiratory distress. Breath sounds: Normal breath sounds. No wheezing or rales. Abdominal:      General: Bowel sounds are normal. There is no distension. Palpations: Abdomen is soft. Tenderness: There is no abdominal tenderness. Genitourinary:     Prostate: Normal.      Rectum: Normal.   Musculoskeletal: Normal range of motion. Lymphadenopathy:      Cervical: No cervical adenopathy. Skin:     General: Skin is warm. Findings: No rash. Neurological:      Mental Status: He is alert and oriented to person, place, and time. Motor: No abnormal muscle tone. Coordination: Coordination normal.           Prior labs reviewed. Assessment/Plan:    ICD-10-CM ICD-9-CM    1. Hyperlipidemia, unspecified hyperlipidemia type  E78.5 899.6 CK      METABOLIC PANEL, COMPREHENSIVE      LIPID PANEL      LIPID PANEL      METABOLIC PANEL, COMPREHENSIVE      CK   2. Medicare annual wellness visit, subsequent  Z00.00 V70.0    3. Essential hypertension  I10 401.9 CBC WITH AUTOMATED DIFF      CBC WITH AUTOMATED DIFF   4. Coronary artery disease involving native coronary artery of native heart without angina pectoris  I25.10 414.01    5. IGT (impaired glucose tolerance)  R73.02 790.22 HEMOGLOBIN A1C WITH EAG      HEMOGLOBIN A1C WITH EAG   6. Benign prostatic hyperplasia without lower urinary tract symptoms  N40.0 600.00 PSA SCREENING (SCREENING)      PSA SCREENING (SCREENING)   7. Vitamin D deficiency  E55.9 268.9 VITAMIN D, 25 HYDROXY      VITAMIN D, 25 HYDROXY   8.  Encounter for screening for malignant neoplasm of prostate   Z12.5 V76.44 PSA SCREENING (SCREENING)      PSA SCREENING (SCREENING)   9. Screening for AAA (abdominal aortic aneurysm)  Z13.6 V81.2 US EXAM SCREENING AAA      US EXAM SCREENING AAA     Follow-up and Dispositions · Return in about 3 months (around 2/13/2021), or if symptoms worsen or fail to improve, for blood pressure.         results and schedule of future studies reviewed with patient  reviewed diet, exercise and weight    cardiovascular risk and specific lipid/LDL goals reviewed  reviewed medications and side effects in detail    Continue current medications   Labs

## 2020-11-13 NOTE — PROGRESS NOTES
This is the Subsequent Medicare Annual Wellness Exam, performed 12 months or more after the Initial AWV or the last Subsequent AWV    I have reviewed the patient's medical history in detail and updated the computerized patient record. Depression Risk Factor Screening:     3 most recent PHQ Screens 11/13/2020   Little interest or pleasure in doing things Not at all   Feeling down, depressed, irritable, or hopeless Not at all   Total Score PHQ 2 0       Alcohol Risk Screen   Do you average more than 1 drink per night or more than 7 drinks a week: No    In the past three months have you have had more than 4 drinks containing alcohol on one occasion: No        Functional Ability and Level of Safety:   Hearing: Hearing is good. Activities of Daily Living: The home contains: no safety equipment. Patient does total self care     Ambulation: with no difficulty     Fall Risk:  Fall Risk Assessment, last 12 mths 11/13/2020   Able to walk? No   Fall in past 12 months? No   Fall with injury? No     Abuse Screen:  Patient is not abused       Cognitive Screening   Has your family/caregiver stated any concerns about your memory:          Assessment/Plan   Education and counseling provided:  Are appropriate based on today's review and evaluation    ICD-10-CM ICD-9-CM    1. Medicare annual wellness visit, subsequent  Z00.00 V70.0    2. Essential hypertension  I10 401.9 CBC WITH AUTOMATED DIFF      CBC WITH AUTOMATED DIFF   3. Coronary artery disease involving native coronary artery of native heart without angina pectoris  I25.10 414.01    4. Hyperlipidemia, unspecified hyperlipidemia type  E78.5 554.3 CK      METABOLIC PANEL, COMPREHENSIVE      LIPID PANEL      LIPID PANEL      METABOLIC PANEL, COMPREHENSIVE      CK   5. IGT (impaired glucose tolerance)  R73.02 790.22 HEMOGLOBIN A1C WITH EAG      HEMOGLOBIN A1C WITH EAG   6.  Benign prostatic hyperplasia without lower urinary tract symptoms  N40.0 600.00 PSA SCREENING (SCREENING)      PSA SCREENING (SCREENING)   7. Vitamin D deficiency  E55.9 268.9 VITAMIN D, 25 HYDROXY      VITAMIN D, 25 HYDROXY   8. Encounter for screening for malignant neoplasm of prostate   Z12.5 V76.44 PSA SCREENING (SCREENING)      PSA SCREENING (SCREENING)   9. Screening for AAA (abdominal aortic aneurysm)  Z13.6 V81.2 US EXAM SCREENING AAA      US EXAM SCREENING AAA     Follow-up and Dispositions    · Return in about 3 months (around 2/13/2021), or if symptoms worsen or fail to improve, for blood pressure.         results and schedule of future  studies reviewed with patient  reviewed diet, exercise and weight    cardiovascular risk and specific lipid/LDL goals reviewed  reviewed medications and side effects in detail    AAA  Screening  PSA    Health Maintenance Due     Health Maintenance Due   Topic Date Due    AAA Screening 73-69 YO Male Smoking Patients  09/05/2014    Flu Vaccine (1) 09/01/2020    Medicare Yearly Exam  11/18/2020       Patient Care Team   Patient Care Team:  Jersey Lucas MD as PCP - General (Internal Medicine)  Jersey Lucas MD as PCP - [address] [place] [address] Provider  Jasen Mckeon MD (Cardiology)    History     Patient Active Problem List   Diagnosis Code    Hypertension I10    Hyperlipidemia with target LDL less than 70 E78.5    CAD (coronary artery disease) I25.10    ED (erectile dysfunction) N52.9    Low back pain M54.5    Cataract H26.9    Benign bladder papilloma D30.3    Carotid artery disease without cerebral infarction ([facility] [address] I77.9    Keratitis H16.9    Advanced directives, counseling/discussion Z71.89    IGT (impaired glucose tolerance) R73.02    Urinary bladder papilloma D41.4    Vitamin D deficiency E55.9     Past Medical History:   Diagnosis Date    Benign bladder papilloma     CAD (coronary artery disease) 10/7/2009    s/p CABG x 5, radial artery and internal mammary, nl stress test 3/08    Carotid artery plaque     mild on life line screening 6/11    Cataract     BA, BEING WATCHED CURRENTLY    Diverticulosis     ED (erectile dysfunction) 10/7/2009    GERD (gastroesophageal reflux disease)     Hyperlipidemia LDL goal < 70 10/7/2009    Hypertension 10/7/2009    IGT (impaired glucose tolerance)     Keratitis     Low back pain 10/7/2009    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 [address], COLON, DIAGNOSTIC      no polyps 2006    HX BLADDER REPAIR  01/2018    bladder resection    HX COLONOSCOPY      HX CORONARY ARTERY BYPASS GRAFT      x 5 arteries     HX CYST REMOVAL  1975    HX HERNIA REPAIR Left 06/04/2020    HX OTHER SURGICAL      pilonidal cyst removed    HX TONSILLECTOMY      HX TONSILLECTOMY      AS A YOUNG CHILD    HX UROLOGICAL      CYSTOSCOPY, HAD BLADDER \"POLYP\" REMOVED     Current Outpatient Medications   Medication Sig Dispense Refill    atenoloL (TENORMIN) 50 mg tablet TAKE 1 TABLET EVERY DAY 90 Tab 3    ibuprofen 200 mg cap Take 600 mg by mouth daily as needed for Pain.  atorvastatin (LIPITOR) 80 mg tablet TAKE 1 TABLET EVERY DAY 90 Tab 3    calcium citrate/vitamin D3 (CITRACAL + D PO) Take 1 Tab by mouth daily.  VITAMIN B COMPLEX (B COMPLEX PO) Take 1 Tab by mouth daily.  MULTIVITAMIN PO Take 1 Tab by mouth daily.  FOLIC ACID PO Take 5 mg by mouth daily.  coenzyme Q-10 (CO Q-10) 200 mg capsule Take 1 Cap by mouth daily. 30 Cap 11    aspirin (ASPIRIN) 325 mg tablet Take 325 mg by mouth daily.  Omeprazole delayed release (PRILOSEC D/R) 20 mg tablet Take 20 mg by mouth daily.        No Known Allergies    Family History   Problem Relation Age of Onset   [facility] Arthritis-osteo Mother     Hypertension Mother     Stroke Mother    [facility] High Cholesterol Mother     Cataract Mother     Other Mother         duodenal ulcer    Heart Disease Father     Cataract Father     Thyroid Disease Sister     High Cholesterol Sister     Cataract Sister     No Known Problems Daughter     No Known Problems Daughter     Anesth Problems Neg Hx      Social History     Tobacco Use    Smoking status: Former Smoker     Packs/day: 1.00     Years: 12.00     Pack years: 12.00     Last attempt to quit: 3/31/1980     Years since quittin.6    Smokeless tobacco: Never Used   Substance Use Topics    Alcohol use:  Yes     Alcohol/week: 5.0 standard drinks     Types: 5 Glasses of wine per week     Comment: occasional

## 2020-11-13 NOTE — PROGRESS NOTES
Chief Complaint   Patient presents with    Complete Physical     Visit Vitals  BP (!) 130/90 (BP 1 Location: Left arm, BP Patient Position: Sitting)   Pulse 68   Temp 99 °F (37.2 °C) (Oral)   Resp 18   Ht 5' 9\" (1.753 m)   Wt 171 lb 9.6 oz (77.8 kg)   SpO2 96%   BMI 25.34 kg/m²       1. Have you been to the ER, urgent care clinic since your last visit? Hospitalized since your last visit? No    2. Have you seen or consulted any other health care providers outside of the [address] [place] [address] since your last visit? Include any pap smears or colon screening.  No     Health Maintenance Due   Topic Date Due    AAA Screening 73-67 YO Male Smoking Patients  09/05/2014    Flu Vaccine (1) 09/01/2020    Medicare Yearly Exam  11/18/2020       3 most recent PHQ Screens 11/13/2020 11/13/2020 6/22/2020   Little interest or pleasure in doing things Not at all Not at all Not at all   Feeling down, depressed, irritable, or hopeless Not at all Not at all Not at all   Total Score PHQ 2 0 0 0

## 2020-11-14 LAB
25(OH)D3+25(OH)D2 SERPL-MCNC: 27.8 NG/ML (ref 30–100)
ALBUMIN SERPL-MCNC: 4.4 G/DL (ref 3.7–4.7)
ALBUMIN/GLOB SERPL: 2.1 {RATIO} (ref 1.2–2.2)
ALP SERPL-CCNC: 65 IU/L (ref 39–117)
ALT SERPL-CCNC: 21 IU/L (ref 0–44)
AST SERPL-CCNC: 28 IU/L (ref 0–40)
BASOPHILS # BLD AUTO: 0.1 X10E3/UL (ref 0–0.2)
BASOPHILS NFR BLD AUTO: 1 %
BILIRUB SERPL-MCNC: 0.4 MG/DL (ref 0–1.2)
BUN SERPL-MCNC: 15 MG/DL (ref 8–27)
BUN/CREAT SERPL: 16 (ref 10–24)
CALCIUM SERPL-MCNC: 9.2 MG/DL (ref 8.6–10.2)
CHLORIDE SERPL-SCNC: 105 MMOL/L (ref 96–106)
CHOLEST SERPL-MCNC: 149 MG/DL (ref 100–199)
CK SERPL-CCNC: 250 U/L (ref 41–331)
CO2 SERPL-SCNC: 21 MMOL/L (ref 20–29)
CREAT SERPL-MCNC: 0.92 MG/DL (ref 0.76–1.27)
EOSINOPHIL # BLD AUTO: 0.2 X10E3/UL (ref 0–0.4)
EOSINOPHIL NFR BLD AUTO: 3 %
ERYTHROCYTE [DISTWIDTH] IN BLOOD BY AUTOMATED COUNT: 13 % (ref 11.6–15.4)
EST. AVERAGE GLUCOSE BLD GHB EST-MCNC: 117 MG/DL
GLOBULIN SER CALC-MCNC: 2.1 G/DL (ref 1.5–4.5)
GLUCOSE SERPL-MCNC: 93 MG/DL (ref 65–99)
HBA1C MFR BLD: 5.7 % (ref 4.8–5.6)
HCT VFR BLD AUTO: 45.9 % (ref 37.5–51)
HDLC SERPL-MCNC: 56 MG/DL
HGB BLD-MCNC: 16 G/DL (ref 13–17.7)
IMM GRANULOCYTES # BLD AUTO: 0 X10E3/UL (ref 0–0.1)
IMM GRANULOCYTES NFR BLD AUTO: 0 %
LDLC SERPL CALC-MCNC: 75 MG/DL (ref 0–99)
LYMPHOCYTES # BLD AUTO: 1.7 X10E3/UL (ref 0.7–3.1)
LYMPHOCYTES NFR BLD AUTO: 25 %
MCH RBC QN AUTO: 31.3 PG (ref 26.6–33)
MCHC RBC AUTO-ENTMCNC: 34.9 G/DL (ref 31.5–35.7)
MCV RBC AUTO: 90 FL (ref 79–97)
MONOCYTES # BLD AUTO: 0.8 X10E3/UL (ref 0.1–0.9)
MONOCYTES NFR BLD AUTO: 12 %
NEUTROPHILS # BLD AUTO: 4.3 X10E3/UL (ref 1.4–7)
NEUTROPHILS NFR BLD AUTO: 59 %
PLATELET # BLD AUTO: 168 X10E3/UL (ref 150–450)
POTASSIUM SERPL-SCNC: 4.6 MMOL/L (ref 3.5–5.2)
PROT SERPL-MCNC: 6.5 G/DL (ref 6–8.5)
PSA SERPL-MCNC: 0.6 NG/ML (ref 0–4)
RBC # BLD AUTO: 5.11 X10E6/UL (ref 4.14–5.8)
SODIUM SERPL-SCNC: 142 MMOL/L (ref 134–144)
TRIGL SERPL-MCNC: 95 MG/DL (ref 0–149)
VLDLC SERPL CALC-MCNC: 18 MG/DL (ref 5–40)
WBC # BLD AUTO: 7.1 X10E3/UL (ref 3.4–10.8)

## 2020-11-15 NOTE — PROGRESS NOTES
Labs are either normal or stable and at goal except for mildly low vitamin D. Take an additional 1000 units per day vitamin D.   Continue your other current medications

## 2020-12-14 ENCOUNTER — HOSPITAL ENCOUNTER (OUTPATIENT)
Dept: ULTRASOUND IMAGING | Age: 70
Discharge: HOME | End: 2020-12-14
Payer: MEDICARE

## 2020-12-14 PROCEDURE — 76706 US ABDL AORTA SCREEN AAA: CPT | Performed by: RADIOLOGY

## 2021-01-14 DIAGNOSIS — E78.5 HYPERLIPIDEMIA, UNSPECIFIED HYPERLIPIDEMIA TYPE: ICD-10-CM

## 2021-01-14 DIAGNOSIS — I25.10 CORONARY ARTERY DISEASE INVOLVING NATIVE CORONARY ARTERY OF NATIVE HEART WITHOUT ANGINA PECTORIS: ICD-10-CM

## 2021-01-14 DIAGNOSIS — I10 ESSENTIAL HYPERTENSION: ICD-10-CM

## 2021-01-14 RX ORDER — ATENOLOL 50 MG/1
50 TABLET ORAL DAILY
Qty: 90 TAB | Refills: 1 | Status: SHIPPED | OUTPATIENT
Start: 2021-01-14 | End: 2021-06-25

## 2021-01-14 RX ORDER — ATORVASTATIN CALCIUM 80 MG/1
80 TABLET, FILM COATED ORAL DAILY
Qty: 90 TAB | Refills: 1 | Status: SHIPPED | OUTPATIENT
Start: 2021-01-14 | End: 2021-06-25

## 2021-01-14 NOTE — TELEPHONE ENCOUNTER
Per patient note on 01/12/2021 patient has new insurance. [facility] is requesting new prescriptions on behalf of the patient. [place] [facility] visit 11/13/2020 MD Sachin Rebolledo   Next appointment 3 months (02/2021)   Previous refill encounter(s)   01/27/2020 Lipitor #90 with 3 refills,  11/04/2020 Tenormin #90 with 3 refills. Requested Prescriptions     Pending Prescriptions Disp Refills    atorvastatin (LIPITOR) 80 mg tablet 90 Tab 1     Sig: Take 1 Tab by mouth daily.  atenoloL (TENORMIN) 50 mg tablet 90 Tab 1     Sig: Take 1 Tab by mouth daily.

## 2021-05-03 ENCOUNTER — OFFICE VISIT (OUTPATIENT)
Dept: CARDIOLOGY CLINIC | Age: 71
End: 2021-05-03
Payer: MEDICARE

## 2021-05-03 VITALS
SYSTOLIC BLOOD PRESSURE: 134 MMHG | HEART RATE: 68 BPM | HEIGHT: 69 IN | DIASTOLIC BLOOD PRESSURE: 92 MMHG | OXYGEN SATURATION: 98 % | WEIGHT: 172.8 LBS | RESPIRATION RATE: 13 BRPM | BODY MASS INDEX: 25.59 KG/M2

## 2021-05-03 DIAGNOSIS — E78.5 HYPERLIPIDEMIA WITH TARGET LDL LESS THAN 70: ICD-10-CM

## 2021-05-03 DIAGNOSIS — I25.10 CORONARY ARTERY DISEASE INVOLVING NATIVE CORONARY ARTERY OF NATIVE HEART WITHOUT ANGINA PECTORIS: ICD-10-CM

## 2021-05-03 DIAGNOSIS — I10 ESSENTIAL HYPERTENSION: Primary | ICD-10-CM

## 2021-05-03 PROCEDURE — 3017F COLORECTAL CA SCREEN DOC REV: CPT | Performed by: SPECIALIST

## 2021-05-03 PROCEDURE — 99213 OFFICE O/P EST LOW 20 MIN: CPT | Performed by: SPECIALIST

## 2021-05-03 PROCEDURE — 1101F PT FALLS ASSESS-DOCD LE1/YR: CPT | Performed by: SPECIALIST

## 2021-05-03 PROCEDURE — G8510 SCR DEP NEG, NO PLAN REQD: HCPCS | Performed by: SPECIALIST

## 2021-05-03 PROCEDURE — G8427 DOCREV CUR MEDS BY ELIG CLIN: HCPCS | Performed by: SPECIALIST

## 2021-05-03 PROCEDURE — G8752 SYS BP LESS 140: HCPCS | Performed by: SPECIALIST

## 2021-05-03 PROCEDURE — G8419 CALC BMI OUT NRM PARAM NOF/U: HCPCS | Performed by: SPECIALIST

## 2021-05-03 PROCEDURE — G8755 DIAS BP > OR = 90: HCPCS | Performed by: SPECIALIST

## 2021-05-03 PROCEDURE — G8536 NO DOC ELDER MAL SCRN: HCPCS | Performed by: SPECIALIST

## 2021-05-03 PROCEDURE — G0463 HOSPITAL OUTPT CLINIC VISIT: HCPCS | Performed by: SPECIALIST

## 2021-05-03 NOTE — PROGRESS NOTES
HISTORY OF PRESENT ILLNESS  Teresa Bernal is a 69 y.o. 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 [address] ICD-10-CM: I77.9  ICD-9-CM: 447.9  4/9/2015        Benign bladder papilloma ICD-10-CM: D30.3  ICD-9-CM: 223.3  Unknown        Cataract ICD-10-CM: H26.9  ICD-9-CM: 366.9  Unknown        Hypertension ICD-10-CM: I10  ICD-9-CM: 401.9  10/7/2009        Hyperlipidemia with target LDL less than 70 ICD-10-CM: E78.5  ICD-9-CM: 272.4  10/7/2009        CAD (coronary artery disease) ICD-10-CM: I25.10  ICD-9-CM: 414.00  10/7/2009    Overview Addendum 3/31/2011  8:24 AM by Tripp Mehta MD     Mr. Lock's cardiac history dates back to September 2004 when he presented with progressive angina, had an abnormal stress test and then cardiac catheterization, which showed significant left main and three-vessel coronary artery disease. He then underwent five-vessel bypass using LIMA and a free radial artery.  LV function was normal.             ED (erectile dysfunction) ICD-10-CM: N52.9  ICD-9-CM: 607.84  10/7/2009        Low back pain ICD-10-CM: M54.5  ICD-9-CM: 724.2  10/7/2009              Current Outpatient Medications on File Prior to Visit   Medication Sig    atorvastatin (LIPITOR) 80 mg tablet Take 1 Tab by mouth daily.    atenoloL (TENORMIN) 50 mg tablet Take 1 Tab by mouth daily.  ibuprofen 200 mg cap Take 600 mg by mouth daily as needed for Pain.  calcium citrate/vitamin D3 (CITRACAL + D PO) Take 1 Tab by mouth daily.  VITAMIN B COMPLEX (B COMPLEX PO) Take 1 Tab by mouth daily.  MULTIVITAMIN PO Take 1 Tab by mouth daily.  FOLIC ACID PO Take 5 mg by mouth daily.  coenzyme Q-10 (CO Q-10) 200 mg capsule Take 1 Cap by mouth daily.  aspirin (ASPIRIN) 325 mg tablet Take 325 mg by mouth daily. No current facility-administered medications on file prior to visit. 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 His blood pressure is little borderline today initially. Apparently his primary care was concerned about the same thing and so he has been monitoring it at home and its been fine. His lipid profile in November looked great.   CARDIAC ROS:   negative for chest pain, dyspnea, palpitations, syncope, orthopnea, paroxysmal nocturnal dyspnea, exertional chest pressure/discomfort, claudication, lower extremity edema    Family History   Problem Relation Age of Onset    Arthritis-osteo Mother     Hypertension Mother     Stroke Mother     High Cholesterol Mother     Cataract Mother     Other Mother         duodenal ulcer    Heart Disease Father     Cataract Father     Thyroid Disease Sister     High Cholesterol Sister     Cataract Sister     No Known Problems Daughter     No Known Problems Daughter     Anesth Problems Neg Hx        Past Medical History:   Diagnosis Date    Benign bladder papilloma     CAD (coronary artery disease) 10/7/2009    s/p CABG x 5, radial artery and internal mammary, nl stress test 3/08    Carotid artery plaque     mild on life line screening 6/11    Cataract     BA, BEING WATCHED CURRENTLY    Diverticulosis     ED (erectile dysfunction) 10/7/2009    GERD (gastroesophageal reflux disease)     Hyperlipidemia LDL goal < 70 10/7/2009    Hypertension 10/7/2009    IGT (impaired glucose tolerance)     Keratitis     Low back pain 10/7/2009    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     Visit Vitals  BP (!) 130/90 (BP 1 Location: Right arm, BP Patient Position: Sitting, BP Cuff Size: Adult)   Pulse 68   Resp 13   Ht 5' 9\" (1.753 m)   Wt 172 lb 12.8 oz (78.4 kg)   SpO2 98%   BMI 25.52 kg/m²       Wt Readings from Last 3 Encounters:   05/03/21 172 lb 12.8 oz (78.4 kg)   11/13/20 171 lb 9.6 oz (77.8 kg)   06/22/20 168 lb 3.2 oz (76.3 kg)            BP Readings from Last 3 Encounters:   05/03/21 (!) 130/90   11/13/20 (!) 130/90   06/22/20 119/72       PHYSICAL EXAM  General appearance: alert, cooperative, no distress, appears stated age  Neurologic: Alert and oriented X 3  Neck: supple, symmetrical, trachea midline, no adenopathy, no carotid bruit and no JVD  Lungs: clear to auscultation bilaterally  Heart: regular rate and rhythm, S1, S2 normal, no murmur, click, rub or gallop  Extremities: extremities normal, atraumatic, no cyanosis or edema    Lab Results   Component Value Date/Time    Cholesterol, total 149 11/13/2020 12:00 AM    Cholesterol, total 128 05/08/2020 09:48 AM    Cholesterol, total 145 11/12/2019 08:30 AM    Cholesterol, total 137 06/20/2019 08:03 AM    Cholesterol, total 134 11/06/2018 08:27 AM    HDL Cholesterol 56 11/13/2020 12:00 AM    HDL Cholesterol 49 05/08/2020 09:48 AM    HDL Cholesterol 51 11/12/2019 08:30 AM    HDL Cholesterol 51 06/20/2019 08:03 AM    HDL Cholesterol 52 11/06/2018 08:27 AM    LDL, calculated 75 11/13/2020 12:00 AM    LDL, calculated 63 05/08/2020 09:48 AM    LDL, calculated 66 11/12/2019 08:30 AM    LDL, calculated 70 06/20/2019 08:03 AM    LDL, calculated 64 11/06/2018 08:27 AM    LDL, calculated 59 05/16/2018 09:39 AM    Triglyceride 95 11/13/2020 12:00 AM    Triglyceride 78 05/08/2020 09:48 AM    Triglyceride 140 11/12/2019 08:30 AM    Triglyceride 81 06/20/2019 08:03 AM    Triglyceride 90 11/06/2018 08:27 AM    CHOL/HDL Ratio 2.3 05/19/2010 10:52 AM    CHOL/HDL Ratio 2.5 11/09/2009 11:49 AM    CHOL/HDL Ratio 2.8 01/23/2009 09:41 AM     ASSESSMENT :      He is stable and asymptomatic, well compensated on a good medical regimen and needs no cardiac testing at this time. If his repeat blood pressure is still elevated he is going to bring his cuff in and will make sure it is properly calibrated. current treatment plan is effective, no change in therapy  lab results and schedule of future lab studies reviewed with patient  reviewed diet, exercise and weight control    Encounter Diagnoses   Name Primary?  Essential hypertension Yes    Coronary artery disease involving native coronary artery of native heart without angina pectoris     Hyperlipidemia with target LDL less than 70      No orders of the defined types were placed in this encounter. Follow-up and Dispositions    · Return in about 1 year (around 5/3/2022). Vishal Simpson MD  5/3/2021  Please note that this dictation was completed with [employer], the computer voice recognition software. Quite often unanticipated grammatical, syntax, homophones, and other interpretive errors are inadvertently transcribed by the computer software. Please disregard these errors.   Please excuse any errors that have escaped final proofreading. Thank you.

## 2021-05-04 ENCOUNTER — TELEPHONE (OUTPATIENT)
Dept: CARDIOLOGY CLINIC | Age: 71
End: 2021-05-04

## 2021-05-04 DIAGNOSIS — E78.5 HYPERLIPIDEMIA WITH TARGET LDL LESS THAN 70: Primary | ICD-10-CM

## 2021-05-04 NOTE — TELEPHONE ENCOUNTER
----- Message from Kathya Vidal MD sent at 5/4/2021  7:28 AM EDT -----  Looks great.  Stay on meds and repeat 6mos

## 2021-05-10 ENCOUNTER — CLINICAL SUPPORT (OUTPATIENT)
Dept: CARDIOLOGY CLINIC | Age: 71
End: 2021-05-10

## 2021-05-10 VITALS — SYSTOLIC BLOOD PRESSURE: 150 MMHG | DIASTOLIC BLOOD PRESSURE: 90 MMHG

## 2021-05-10 DIAGNOSIS — I10 ESSENTIAL HYPERTENSION: Primary | ICD-10-CM

## 2021-05-10 NOTE — PROGRESS NOTES
Patient brought BP readings from home (copy sent to scanning) that looked okay. In office his monitor read higher BP than manual BP. Per Dr. Yolis LUONG, patient does not need to start new medication. He can continue to monitor BP at home and notify us if anything changes. Patient verbalized understanding and denied further questions or concerns.

## 2021-06-24 DIAGNOSIS — I25.10 CORONARY ARTERY DISEASE INVOLVING NATIVE CORONARY ARTERY OF NATIVE HEART WITHOUT ANGINA PECTORIS: ICD-10-CM

## 2021-06-24 DIAGNOSIS — I10 ESSENTIAL HYPERTENSION: ICD-10-CM

## 2021-06-24 DIAGNOSIS — E78.5 HYPERLIPIDEMIA, UNSPECIFIED HYPERLIPIDEMIA TYPE: ICD-10-CM

## 2021-06-25 RX ORDER — ATENOLOL 50 MG/1
TABLET ORAL
Qty: 90 TABLET | Refills: 1 | Status: SHIPPED | OUTPATIENT
Start: 2021-06-25 | End: 2021-12-06

## 2021-06-25 RX ORDER — ATORVASTATIN CALCIUM 80 MG/1
TABLET, FILM COATED ORAL
Qty: 90 TABLET | Refills: 1 | Status: SHIPPED | OUTPATIENT
Start: 2021-06-25 | End: 2021-12-06

## 2021-11-15 ENCOUNTER — OFFICE VISIT (OUTPATIENT)
Dept: INTERNAL MEDICINE CLINIC | Age: 71
End: 2021-11-15
Payer: MEDICARE

## 2021-11-15 VITALS
OXYGEN SATURATION: 97 % | HEIGHT: 69 IN | RESPIRATION RATE: 14 BRPM | SYSTOLIC BLOOD PRESSURE: 124 MMHG | TEMPERATURE: 98.4 F | BODY MASS INDEX: 25.53 KG/M2 | DIASTOLIC BLOOD PRESSURE: 80 MMHG | WEIGHT: 172.4 LBS | HEART RATE: 69 BPM

## 2021-11-15 DIAGNOSIS — H26.9 CATARACT OF BOTH EYES, UNSPECIFIED CATARACT TYPE: ICD-10-CM

## 2021-11-15 DIAGNOSIS — E55.9 VITAMIN D DEFICIENCY: ICD-10-CM

## 2021-11-15 DIAGNOSIS — N40.0 BENIGN PROSTATIC HYPERPLASIA WITHOUT LOWER URINARY TRACT SYMPTOMS: ICD-10-CM

## 2021-11-15 DIAGNOSIS — E78.5 HYPERLIPIDEMIA, UNSPECIFIED HYPERLIPIDEMIA TYPE: ICD-10-CM

## 2021-11-15 DIAGNOSIS — Z00.00 MEDICARE ANNUAL WELLNESS VISIT, SUBSEQUENT: Primary | ICD-10-CM

## 2021-11-15 DIAGNOSIS — I77.9 CAROTID ARTERY DISEASE WITHOUT CEREBRAL INFARCTION (HCC): ICD-10-CM

## 2021-11-15 DIAGNOSIS — I10 ESSENTIAL HYPERTENSION: ICD-10-CM

## 2021-11-15 DIAGNOSIS — R73.02 IGT (IMPAIRED GLUCOSE TOLERANCE): ICD-10-CM

## 2021-11-15 PROCEDURE — G8419 CALC BMI OUT NRM PARAM NOF/U: HCPCS | Performed by: INTERNAL MEDICINE

## 2021-11-15 PROCEDURE — G8536 NO DOC ELDER MAL SCRN: HCPCS | Performed by: INTERNAL MEDICINE

## 2021-11-15 PROCEDURE — G0463 HOSPITAL OUTPT CLINIC VISIT: HCPCS | Performed by: INTERNAL MEDICINE

## 2021-11-15 PROCEDURE — G8754 DIAS BP LESS 90: HCPCS | Performed by: INTERNAL MEDICINE

## 2021-11-15 PROCEDURE — 3017F COLORECTAL CA SCREEN DOC REV: CPT | Performed by: INTERNAL MEDICINE

## 2021-11-15 PROCEDURE — G8510 SCR DEP NEG, NO PLAN REQD: HCPCS | Performed by: INTERNAL MEDICINE

## 2021-11-15 PROCEDURE — G8752 SYS BP LESS 140: HCPCS | Performed by: INTERNAL MEDICINE

## 2021-11-15 PROCEDURE — 99214 OFFICE O/P EST MOD 30 MIN: CPT | Performed by: INTERNAL MEDICINE

## 2021-11-15 PROCEDURE — G8427 DOCREV CUR MEDS BY ELIG CLIN: HCPCS | Performed by: INTERNAL MEDICINE

## 2021-11-15 PROCEDURE — 1101F PT FALLS ASSESS-DOCD LE1/YR: CPT | Performed by: INTERNAL MEDICINE

## 2021-11-15 PROCEDURE — G0439 PPPS, SUBSEQ VISIT: HCPCS | Performed by: INTERNAL MEDICINE

## 2021-11-15 NOTE — PROGRESS NOTES
This is the Subsequent Medicare Annual Wellness Exam, performed 12 months or more after the Initial AWV or the last Subsequent AWV    I have reviewed the patient's medical history in detail and updated the computerized patient record. Assessment/Plan   Education and counseling provided:  Are appropriate based on today's review and evaluation    ICD-10-CM ICD-9-CM    1. Medicare annual wellness visit, subsequent  Z00.00 V70.0    2. Essential hypertension  I10 401.9 CBC WITH AUTOMATED DIFF   3. Hyperlipidemia, unspecified hyperlipidemia type  E78.5 180.8 CK      METABOLIC PANEL, COMPREHENSIVE      LIPID PANEL   4. IGT (impaired glucose tolerance)  R73.02 790.22 HEMOGLOBIN A1C WITH EAG   5. Vitamin D deficiency  E55.9 268.9 VITAMIN D, 25 HYDROXY   6. Benign prostatic hyperplasia without lower urinary tract symptoms  N40.0 600.00 PSA, DIAGNOSTIC (PROSTATE SPECIFIC AG)   7. Carotid artery disease without cerebral infarction (HCC)  I77.9 447.9    8. Cataract of both eyes, unspecified cataract type  H26.9 366.9      Follow-up and Dispositions    · Return in about 1 year (around 11/15/2022), or if symptoms worsen or fail to improve, for Medicare Wellness Visit. results and schedule of future studies reviewed with patient  reviewed diet, exercise and weight   cardiovascular risk and specific lipid/LDL goals reviewed  reviewed medications and side effects in detail      Depression Risk Factor Screening     3 most recent PHQ Screens 11/15/2021   Little interest or pleasure in doing things Not at all   Feeling down, depressed, irritable, or hopeless Not at all   Total Score PHQ 2 0       Alcohol Risk Screen    Do you average more than 1 drink per night or more than 7 drinks a week: No    In the past three months have you have had more than 4 drinks containing alcohol on one occasion: No        Functional Ability and Level of Safety    Hearing: Hearing is good. Activities of Daily Living:   The home contains: no safety equipment. Patient does total self care      Ambulation: with no difficulty     Fall Risk:  Fall Risk Assessment, last 12 mths 11/15/2021   Able to walk? Yes   Fall in past 12 months? 0   Do you feel unsteady? 0   Are you worried about falling 0   Fall with injury?  -      Abuse Screen:  Patient is not abused       Cognitive Screening    Has your family/caregiver stated any concerns about your memory: no       Health Maintenance Due     Health Maintenance Due   Topic Date Due    COVID-19 Vaccine (1) Never done       Patient Care Team   Patient Care Team:  Tanisha Hinojosa MD as PCP - General (Internal Medicine)  Tanisha Hinojosa MD as PCP - [address] [place] [address] [facility]  Gibson Ngo MD (Cardiology)    History     Patient Active Problem List   Diagnosis Code    Hypertension I10    Hyperlipidemia with target LDL less than 70 E78.5    CAD (coronary artery disease) I25.10    ED (erectile dysfunction) N52.9    Low back pain M54.50    Cataract H26.9    Benign bladder papilloma D30.3    Carotid artery disease without cerebral infarction ([facility] [address] I77.9    Keratitis H16.9    Advanced directives, counseling/discussion Z71.89    IGT (impaired glucose tolerance) R73.02    Urinary bladder papilloma D41.4    Vitamin D deficiency E55.9     Past Medical History:   Diagnosis Date    Benign bladder papilloma     CAD (coronary artery disease) 10/7/2009    s/p CABG x 5, radial artery and internal mammary, nl stress test 3/08    Carotid artery plaque     mild on life line screening 6/11    Cataract     BA, BEING WATCHED CURRENTLY    Diverticulosis     ED (erectile dysfunction) 10/7/2009    GERD (gastroesophageal reflux disease)     Hyperlipidemia LDL goal < 70 10/7/2009    Hypertension 10/7/2009    IGT (impaired glucose tolerance)     Keratitis     Low back pain 10/7/2009    Urinary bladder papilloma     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 ENDOSCOPY, COLON, DIAGNOSTIC      no polyps     HX BLADDER REPAIR  2018    bladder resection    HX COLONOSCOPY      HX CORONARY ARTERY BYPASS GRAFT      x 5 arteries     HX CYST REMOVAL      HX HERNIA REPAIR Left 2020    HX OTHER SURGICAL      pilonidal cyst removed    HX TONSILLECTOMY      HX TONSILLECTOMY      AS A YOUNG CHILD    HX UROLOGICAL      CYSTOSCOPY, HAD BLADDER \"POLYP\" REMOVED    NV CARDIAC SURG PROCEDURE UNLIST      CABG x 5 vessels     Current Outpatient Medications   Medication Sig Dispense Refill    atorvastatin (LIPITOR) 80 mg tablet TAKE 1 TABLET DAILY 90 Tablet 1    atenoloL (TENORMIN) 50 mg tablet TAKE 1 TABLET DAILY 90 Tablet 1    ibuprofen 200 mg cap Take 600 mg by mouth daily as needed for Pain.  calcium citrate/vitamin D3 (CITRACAL + D PO) Take 1 Tab by mouth daily.  VITAMIN B COMPLEX (B COMPLEX PO) Take 1 Tab by mouth daily.  MULTIVITAMIN PO Take 1 Tab by mouth daily.  FOLIC ACID PO Take 5 mg by mouth daily.  coenzyme Q-10 (CO Q-10) 200 mg capsule Take 1 Cap by mouth daily. 30 Cap 11    aspirin (ASPIRIN) 325 mg tablet Take 325 mg by mouth daily.          No Known Allergies    Family History   Problem Relation Age of Onset   [facility] Arthritis-osteo Mother     Hypertension Mother     Stroke Mother     High Cholesterol Mother     Cataract Mother     Other Mother         duodenal ulcer    Heart Disease Father     Cataract Father     Thyroid Disease Sister     High Cholesterol Sister     Cataract Sister     No Known Problems Daughter     No Known Problems Daughter     Anesth Problems Neg Hx      Social History     Tobacco Use    Smoking status: Former Smoker     Packs/day: 1.00     Years: 12.00     Pack years: 12.00     Quit date: 3/31/1980     Years since quittin.6    Smokeless tobacco: Never Used Substance Use Topics    Alcohol use:  Yes     Alcohol/week: 5.0 standard drinks     Types: 5 Glasses of wine per week     Comment: occasional         Tommy Ryder MD

## 2021-11-15 NOTE — PROGRESS NOTES
Chief Complaint   Patient presents with    Physical     rm 13    1. Have you been to the ER, urgent care clinic since your last visit? Hospitalized since your last visit? No    2. Have you seen or consulted any other health care providers outside of the [address] [place] [address] since your last visit? Include any pap smears or colon screening.  Yes [place] [facility] Dr. Junior Cage    Visit Vitals  BP (!) 147/84   Pulse 69   Temp 98.4 °F (36.9 °C) (Oral)   Resp 14   Ht 5' 9\" (1.753 m)   Wt 172 lb 6.4 oz (78.2 kg)   SpO2 97%   BMI 25.46 kg/m²

## 2021-11-15 NOTE — PROGRESS NOTES
HPI:  established patient  Presents for f/u HTN, lipids, IGT, etc    Home BP monitoring - 120-130's/70-80's    No CP, SOB, neuro sx    Doing well. Cataract surgeries planned    Past medical, Social, and Family history reviewed    Prior to Admission medications    Medication Sig Start Date End Date Taking? Authorizing Provider   atorvastatin (LIPITOR) 80 mg tablet TAKE 1 TABLET DAILY 6/25/21  Yes Jorge Hathaway MD   atenoloL (TENORMIN) 50 mg tablet TAKE 1 TABLET DAILY 6/25/21  Yes Jorge Hathaway MD   ibuprofen 200 mg cap Take 600 mg by mouth daily as needed for Pain. Yes Provider, Historical   calcium citrate/vitamin D3 (CITRACAL + D PO) Take 1 Tab by mouth daily. Yes Provider, Historical   VITAMIN B COMPLEX (B COMPLEX PO) Take 1 Tab by mouth daily. Yes Provider, Historical   MULTIVITAMIN PO Take 1 Tab by mouth daily. Yes Provider, Historical   FOLIC ACID PO Take 5 mg by mouth daily. Yes Provider, Historical   coenzyme Q-10 (CO Q-10) 200 mg capsule Take 1 Cap by mouth daily. 10/10/12  Yes Jorge Hathaway MD   aspirin (ASPIRIN) 325 mg tablet Take 325 mg by mouth daily. Yes Provider, Historical          ROS  Complete ROS reviewed and negative or stable except as noted in HPI. Physical Exam  Vitals and nursing note reviewed. Constitutional:       General: He is not in acute distress. HENT:      Head: Normocephalic and atraumatic. Mouth/Throat:      Pharynx: No oropharyngeal exudate. Eyes:      General: No scleral icterus. Pupils: Pupils are equal, round, and reactive to light. Neck:      Thyroid: No thyromegaly. Vascular: No JVD. Cardiovascular:      Rate and Rhythm: Normal rate and regular rhythm. Heart sounds: Normal heart sounds. No murmur heard. No friction rub. No gallop. Pulmonary:      Effort: Pulmonary effort is normal. No respiratory distress. Breath sounds: Normal breath sounds. No wheezing or rales.    Abdominal:      General: Bowel sounds are normal. There is no distension. Palpations: Abdomen is soft. Tenderness: There is no abdominal tenderness. Genitourinary:     Prostate: Normal.      Rectum: Normal.   Musculoskeletal:         General: Normal range of motion. Cervical back: Normal range of motion and neck supple. Lymphadenopathy:      Cervical: No cervical adenopathy. Skin:     General: Skin is warm. Findings: No rash. Neurological:      Mental Status: He is alert and oriented to person, place, and time. Motor: No abnormal muscle tone. Coordination: Coordination normal.           Prior labs reviewed. Assessment/Plan:    ICD-10-CM ICD-9-CM    1. Essential hypertension  I10 401.9 CBC WITH AUTOMATED DIFF   2. Medicare annual wellness visit, subsequent  Z00.00 V70.0    3. Hyperlipidemia, unspecified hyperlipidemia type  E78.5 215.4 CK      METABOLIC PANEL, COMPREHENSIVE      LIPID PANEL   4. IGT (impaired glucose tolerance)  R73.02 790.22 HEMOGLOBIN A1C WITH EAG   5. Vitamin D deficiency  E55.9 268.9 VITAMIN D, 25 HYDROXY   6. Benign prostatic hyperplasia without lower urinary tract symptoms  N40.0 600.00 PSA, DIAGNOSTIC (PROSTATE SPECIFIC AG)   7. Carotid artery disease without cerebral infarction (HCC)  I77.9 447.9    8. Cataract of both eyes, unspecified cataract type  H26.9 366.9      Follow-up and Dispositions    · Return in about 1 year (around 11/15/2022), or if symptoms worsen or fail to improve, for Medicare Wellness Visit. results and schedule of future studies reviewed with patient  reviewed diet, exercise and weight  cardiovascular risk and specific lipid/LDL goals reviewed  reviewed medications and side effects in detail    Stable for cataract procedures  Continue current medications   Labs      An electronic signature was used to authenticate this note.   -- Elvis Grove MD

## 2021-11-15 NOTE — PATIENT INSTRUCTIONS
Medicare Wellness Visit, Male    The best way to live healthy is to have a lifestyle where you eat a well-balanced diet, exercise regularly, limit alcohol use, and quit all forms of tobacco/nicotine, if applicable. Regular preventive services are another way to keep healthy. Preventive services (vaccines, screening tests, monitoring & exams) can help personalize your care plan, which helps you manage your own care. Screening tests can find health problems at the earliest stages, when they are easiest to treat. [facility] follows the current, evidence-based guidelines published by the [facility] ([facility]) when recommending preventive services for our patients. Because we follow these guidelines, sometimes recommendations change over time as research supports it. (For example, a prostate screening blood test is no longer routinely recommended for men with no symptoms). Of course, you and your doctor may decide to screen more often for some diseases, based on your risk and co-morbidities (chronic disease you are already diagnosed with). Preventive services for you include:  - Medicare offers their members a free annual wellness visit, which is time for you and your primary care provider to discuss and plan for your preventive service needs. Take advantage of this benefit every year!  -All adults over age 72 should receive the recommended pneumonia vaccines. Current USPSTF guidelines recommend a series of two vaccines for the best pneumonia protection.   -All adults should have a flu vaccine yearly and tetanus vaccine every 10 years.  -All adults age 48 and older should receive the shingles vaccines (series of two vaccines).        -All adults age 38-68 who are overweight should have a diabetes screening test once every three years.   -Other screening tests & preventive services for persons with diabetes include: an eye exam to screen for diabetic retinopathy, a kidney function test, a foot exam, and stricter control over your cholesterol.   -Cardiovascular screening for adults with routine risk involves an electrocardiogram (ECG) at intervals determined by the provider.   -Colorectal cancer screening should be done for adults age 54-65 with no increased risk factors for colorectal cancer. There are a number of acceptable methods of screening for this type of cancer. Each test has its own benefits and drawbacks. Discuss with your provider what is most appropriate for you during your annual wellness visit. The different tests include: colonoscopy (considered the best screening method), a fecal occult blood test, a fecal DNA test, and sigmoidoscopy.  -All adults born between [facility] should be screened once for Hepatitis C.  -An Abdominal Aortic Aneurysm (AAA) Screening is recommended for men age 73-68 who has ever smoked in their lifetime.      Here is a list of your current Health Maintenance items (your personalized list of preventive services) with a due date:  Health Maintenance Due   Topic Date Due    COVID-19 Vaccine (1) Never done

## 2021-11-18 LAB
25(OH)D3+25(OH)D2 SERPL-MCNC: 36.3 NG/ML (ref 30–100)
ALBUMIN SERPL-MCNC: 4.2 G/DL (ref 3.7–4.7)
ALBUMIN/GLOB SERPL: 2 {RATIO} (ref 1.2–2.2)
ALP SERPL-CCNC: 63 IU/L (ref 44–121)
ALT SERPL-CCNC: 22 IU/L (ref 0–44)
AST SERPL-CCNC: 26 IU/L (ref 0–40)
BASOPHILS # BLD AUTO: 0.1 X10E3/UL (ref 0–0.2)
BASOPHILS NFR BLD AUTO: 1 %
BILIRUB SERPL-MCNC: 0.5 MG/DL (ref 0–1.2)
BUN SERPL-MCNC: 15 MG/DL (ref 8–27)
BUN/CREAT SERPL: 15 (ref 10–24)
CALCIUM SERPL-MCNC: 9 MG/DL (ref 8.6–10.2)
CHLORIDE SERPL-SCNC: 104 MMOL/L (ref 96–106)
CHOLEST SERPL-MCNC: 129 MG/DL (ref 100–199)
CK SERPL-CCNC: 132 U/L (ref 41–331)
CO2 SERPL-SCNC: 24 MMOL/L (ref 20–29)
CREAT SERPL-MCNC: 0.99 MG/DL (ref 0.76–1.27)
EOSINOPHIL # BLD AUTO: 0.3 X10E3/UL (ref 0–0.4)
EOSINOPHIL NFR BLD AUTO: 4 %
ERYTHROCYTE [DISTWIDTH] IN BLOOD BY AUTOMATED COUNT: 12.7 % (ref 11.6–15.4)
EST. AVERAGE GLUCOSE BLD GHB EST-MCNC: 126 MG/DL
GLOBULIN SER CALC-MCNC: 2.1 G/DL (ref 1.5–4.5)
GLUCOSE SERPL-MCNC: 87 MG/DL (ref 65–99)
HBA1C MFR BLD: 6 % (ref 4.8–5.6)
HCT VFR BLD AUTO: 47.4 % (ref 37.5–51)
HDLC SERPL-MCNC: 45 MG/DL
HGB BLD-MCNC: 15.8 G/DL (ref 13–17.7)
IMM GRANULOCYTES # BLD AUTO: 0 X10E3/UL (ref 0–0.1)
IMM GRANULOCYTES NFR BLD AUTO: 0 %
LDLC SERPL CALC-MCNC: 66 MG/DL (ref 0–99)
LYMPHOCYTES # BLD AUTO: 2 X10E3/UL (ref 0.7–3.1)
LYMPHOCYTES NFR BLD AUTO: 30 %
MCH RBC QN AUTO: 30.6 PG (ref 26.6–33)
MCHC RBC AUTO-ENTMCNC: 33.3 G/DL (ref 31.5–35.7)
MCV RBC AUTO: 92 FL (ref 79–97)
MONOCYTES # BLD AUTO: 0.9 X10E3/UL (ref 0.1–0.9)
MONOCYTES NFR BLD AUTO: 14 %
NEUTROPHILS # BLD AUTO: 3.4 X10E3/UL (ref 1.4–7)
NEUTROPHILS NFR BLD AUTO: 51 %
PLATELET # BLD AUTO: 172 X10E3/UL (ref 150–450)
POTASSIUM SERPL-SCNC: 4.4 MMOL/L (ref 3.5–5.2)
PROT SERPL-MCNC: 6.3 G/DL (ref 6–8.5)
PSA SERPL-MCNC: 0.6 NG/ML (ref 0–4)
RBC # BLD AUTO: 5.16 X10E6/UL (ref 4.14–5.8)
SODIUM SERPL-SCNC: 142 MMOL/L (ref 134–144)
TRIGL SERPL-MCNC: 97 MG/DL (ref 0–149)
VLDLC SERPL CALC-MCNC: 18 MG/DL (ref 5–40)
WBC # BLD AUTO: 6.6 X10E3/UL (ref 3.4–10.8)

## 2021-12-05 DIAGNOSIS — I25.10 CORONARY ARTERY DISEASE INVOLVING NATIVE CORONARY ARTERY OF NATIVE HEART WITHOUT ANGINA PECTORIS: ICD-10-CM

## 2021-12-05 DIAGNOSIS — I10 ESSENTIAL HYPERTENSION: ICD-10-CM

## 2021-12-05 DIAGNOSIS — E78.5 HYPERLIPIDEMIA, UNSPECIFIED HYPERLIPIDEMIA TYPE: ICD-10-CM

## 2021-12-06 RX ORDER — ATENOLOL 50 MG/1
TABLET ORAL
Qty: 90 TABLET | Refills: 1 | Status: SHIPPED | OUTPATIENT
Start: 2021-12-06 | End: 2022-06-16

## 2021-12-06 RX ORDER — ATORVASTATIN CALCIUM 80 MG/1
TABLET, FILM COATED ORAL
Qty: 90 TABLET | Refills: 1 | Status: SHIPPED | OUTPATIENT
Start: 2021-12-06 | End: 2022-06-16

## 2022-03-19 PROBLEM — E55.9 VITAMIN D DEFICIENCY: Status: ACTIVE | Noted: 2018-05-15

## 2022-04-14 PROBLEM — H20.9 UVEITIS OF RIGHT EYE: Status: ACTIVE | Noted: 2022-04-14

## 2022-05-03 ENCOUNTER — OFFICE VISIT (OUTPATIENT)
Dept: CARDIOLOGY CLINIC | Age: 72
End: 2022-05-03
Payer: MEDICARE

## 2022-05-03 VITALS
HEIGHT: 69 IN | WEIGHT: 170 LBS | DIASTOLIC BLOOD PRESSURE: 80 MMHG | RESPIRATION RATE: 16 BRPM | OXYGEN SATURATION: 97 % | BODY MASS INDEX: 25.18 KG/M2 | HEART RATE: 88 BPM | SYSTOLIC BLOOD PRESSURE: 110 MMHG

## 2022-05-03 DIAGNOSIS — I77.9 CAROTID ARTERY DISEASE WITHOUT CEREBRAL INFARCTION (HCC): ICD-10-CM

## 2022-05-03 DIAGNOSIS — I25.10 CORONARY ARTERY DISEASE INVOLVING NATIVE CORONARY ARTERY OF NATIVE HEART WITHOUT ANGINA PECTORIS: Primary | ICD-10-CM

## 2022-05-03 DIAGNOSIS — E78.5 HYPERLIPIDEMIA WITH TARGET LDL LESS THAN 70: ICD-10-CM

## 2022-05-03 DIAGNOSIS — I10 PRIMARY HYPERTENSION: ICD-10-CM

## 2022-05-03 PROCEDURE — G8432 DEP SCR NOT DOC, RNG: HCPCS | Performed by: SPECIALIST

## 2022-05-03 PROCEDURE — G0463 HOSPITAL OUTPT CLINIC VISIT: HCPCS | Performed by: SPECIALIST

## 2022-05-03 PROCEDURE — 3017F COLORECTAL CA SCREEN DOC REV: CPT | Performed by: SPECIALIST

## 2022-05-03 PROCEDURE — G8752 SYS BP LESS 140: HCPCS | Performed by: SPECIALIST

## 2022-05-03 PROCEDURE — 99213 OFFICE O/P EST LOW 20 MIN: CPT | Performed by: SPECIALIST

## 2022-05-03 PROCEDURE — G8536 NO DOC ELDER MAL SCRN: HCPCS | Performed by: SPECIALIST

## 2022-05-03 PROCEDURE — G8427 DOCREV CUR MEDS BY ELIG CLIN: HCPCS | Performed by: SPECIALIST

## 2022-05-03 PROCEDURE — 1101F PT FALLS ASSESS-DOCD LE1/YR: CPT | Performed by: SPECIALIST

## 2022-05-03 PROCEDURE — G8754 DIAS BP LESS 90: HCPCS | Performed by: SPECIALIST

## 2022-05-03 PROCEDURE — G8419 CALC BMI OUT NRM PARAM NOF/U: HCPCS | Performed by: SPECIALIST

## 2022-05-03 RX ORDER — PREDNISOLONE ACETATE 10 MG/ML
SUSPENSION/ DROPS OPHTHALMIC
COMMUNITY
Start: 2022-04-14

## 2022-05-03 NOTE — PROGRESS NOTES
HISTORY OF PRESENT ILLNESS  [employer] is a 66 y.o. 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             IGT (impaired glucose tolerance) ICD-10-CM: R73.02  ICD-9-CM: 790.22  Unknown        Advanced directives, counseling/discussion ICD-10-CM: Z71.89  ICD-9-CM: V65.49  11/21/2016    Overview Signed 11/21/2016  9:43 AM by Puneet Shell MD     On file             Keratitis ICD-10-CM: H16.9  ICD-9-CM: 370.9  Unknown        Carotid artery disease without cerebral infarction ([facility] [address] ICD-10-CM: I77.9  ICD-9-CM: 447.9  4/9/2015        Benign bladder papilloma ICD-10-CM: D30.3  ICD-9-CM: 223.3  Unknown        Cataract ICD-10-CM: H26.9  ICD-9-CM: 366.9  Unknown        Hypertension ICD-10-CM: I10  ICD-9-CM: 401.9  10/7/2009        Hyperlipidemia with target LDL less than 70 ICD-10-CM: E78.5  ICD-9-CM: 272.4  10/7/2009        CAD (coronary artery disease) ICD-10-CM: I25.10  ICD-9-CM: 414.00  10/7/2009    Overview Addendum 3/31/2011  8:24 AM by Shailesh Mora MD     Mr. Lock's cardiac history dates back to September 2004 when he presented with progressive angina, had an abnormal stress test and then cardiac catheterization, which showed significant left main and three-vessel coronary artery disease. He then underwent five-vessel bypass using LIMA and a free radial artery.  LV function was normal.             ED (erectile dysfunction) ICD-10-CM: N52.9  ICD-9-CM: 607.84  10/7/2009        Low back pain ICD-10-CM: M54.50  ICD-9-CM: 724.2  10/7/2009              Current Outpatient Medications on File Prior to Visit Medication Sig    prednisoLONE acetate (PRED FORTE) 1 % ophthalmic suspension INSTILL 1 DROP 4 TIMES A DAY INTO EYE AFTER SURGERY FOR 1 WEEK THEN TAPER AS DIRECTED    atorvastatin (LIPITOR) 80 mg tablet TAKE 1 TABLET DAILY    atenoloL (TENORMIN) 50 mg tablet TAKE 1 TABLET DAILY    ibuprofen 200 mg cap Take 600 mg by mouth daily as needed for Pain.  calcium citrate/vitamin D3 (CITRACAL + D PO) Take 1 Tab by mouth daily.  VITAMIN B COMPLEX (B COMPLEX PO) Take 1 Tab by mouth daily.  MULTIVITAMIN PO Take 1 Tab by mouth daily.  FOLIC ACID PO Take 5 mg by mouth daily.  coenzyme Q-10 (CO Q-10) 200 mg capsule Take 1 Cap by mouth daily.  aspirin (ASPIRIN) 325 mg tablet Take 325 mg by mouth daily. No current facility-administered medications on file prior to visit. CARDIOLOGY STUDIES TO DATE:  4/11 normal stress echo   6/11 life line screen , mild bilateral carotid disease   4/13 carotid dopplers 10-49% right and 0-9% left stenoses   4/15 carotid dopplers 10-49% right and 0-9% left stenoses   2/18 normal stress echo   4/19 carotid dopplers, mild bilateral stenosis, no change  12/20 neg AAA screening    Chief Complaint   Patient presents with    Follow-up     HPI :  He is doing great with no cardiac complaints or concerns. Blood pressures well controlled and he is having no problems with his medications. His lipid profile in November looked great. They are hoping they are going be able to go on a cruise later this month through the [facility].   He is exercising some though not as much as he would like  CARDIAC ROS:   negative for chest pain, dyspnea, palpitations, syncope, orthopnea, paroxysmal nocturnal dyspnea, exertional chest pressure/discomfort, claudication, lower extremity edema    Family History   Problem Relation Age of Onset    OSTEOARTHRITIS Mother     Hypertension Mother     Stroke Mother     High Cholesterol Mother     Cataract Mother    [facility] Other Mother duodenal ulcer    Heart Disease Father     Cataract Father     Thyroid Disease Sister     High Cholesterol Sister    [place] Cataract Sister     No Known Problems Daughter     No Known Problems Daughter     Anesth Problems Neg Hx        Past Medical History:   Diagnosis Date    Benign bladder papilloma     CAD (coronary artery disease) 10/7/2009    s/p CABG x 5, radial artery and internal mammary, nl stress test 3/08    Carotid artery plaque     mild on life line screening 6/11    Cataract     BA, BEING WATCHED CURRENTLY    Diverticulosis     ED (erectile dysfunction) 10/7/2009    GERD (gastroesophageal reflux disease)     Hyperlipidemia LDL goal < 70 10/7/2009    Hypertension 10/7/2009    IGT (impaired glucose tolerance)     Keratitis     Low back pain 10/7/2009    Urinary bladder papilloma     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     Visit Vitals  /80   Pulse 88   Resp 16   Ht 5' 9\" (1.753 m)   Wt 170 lb (77.1 kg)   SpO2 97%   BMI 25.10 kg/m²       Wt Readings from Last 3 Encounters:   05/03/22 170 lb (77.1 kg)   11/15/21 172 lb 6.4 oz (78.2 kg)   05/03/21 172 lb 12.8 oz (78.4 kg)            BP Readings from Last 3 Encounters:   05/03/22 110/80   11/15/21 124/80   05/10/21 (!) 150/90       PHYSICAL EXAM  General appearance: alert, cooperative, no distress, appears stated age  Neurologic: Alert and oriented X 3  Neck: supple, symmetrical, trachea midline, no adenopathy, no carotid bruit and no JVD  Lungs: clear to auscultation bilaterally  Heart: regular rate and rhythm, S1, S2 normal, no murmur, click, rub or gallop  Extremities: extremities normal, atraumatic, no cyanosis or edema    Lab Results   Component Value Date/Time    Cholesterol, total 129 11/17/2021 09:39 AM    Cholesterol, total 158 05/03/2021 10:52 AM    Cholesterol, total 149 11/13/2020 12:00 AM    Cholesterol, total 128 05/08/2020 09:48 AM    Cholesterol, total 145 11/12/2019 08:30 AM    HDL Cholesterol 45 11/17/2021 09:39 AM    HDL Cholesterol 54 05/03/2021 10:52 AM    HDL Cholesterol 56 11/13/2020 12:00 AM    HDL Cholesterol 49 05/08/2020 09:48 AM    HDL Cholesterol 51 11/12/2019 08:30 AM    LDL, calculated 66 11/17/2021 09:39 AM    LDL, calculated 81.6 05/03/2021 10:52 AM    LDL, calculated 75 11/13/2020 12:00 AM    LDL, calculated 63 05/08/2020 09:48 AM    LDL, calculated 66 11/12/2019 08:30 AM    LDL, calculated 70 06/20/2019 08:03 AM    LDL, calculated 64 11/06/2018 08:27 AM    Triglyceride 97 11/17/2021 09:39 AM    Triglyceride 112 05/03/2021 10:52 AM    Triglyceride 95 11/13/2020 12:00 AM    Triglyceride 78 05/08/2020 09:48 AM    Triglyceride 140 11/12/2019 08:30 AM    CHOL/HDL Ratio 2.9 05/03/2021 10:52 AM    CHOL/HDL Ratio 2.3 05/19/2010 10:52 AM    CHOL/HDL Ratio 2.5 11/09/2009 11:49 AM    CHOL/HDL Ratio 2.8 01/23/2009 09:41 AM     ASSESSMENT :      He is stable and asymptomatic, well compensated on a good medical regimen and needs no cardiac testing at this time. We will repeat his carotid Dopplers at follow-up  current treatment plan is effective, no change in therapy  lab results and schedule of future lab studies reviewed with patient  reviewed diet, exercise and weight control    Encounter Diagnoses   Name Primary?  Coronary artery disease involving native coronary artery of native heart without angina pectoris Yes    Primary hypertension     Hyperlipidemia with target LDL less than 70     Carotid artery disease without cerebral infarction ([address]      Orders Placed This Encounter    prednisoLONE acetate (PRED FORTE) 1 % ophthalmic suspension       Follow-up and Dispositions    · Return in about 1 year (around 5/3/2023).          Juanjose Atwood MD  5/3/2022  Please note that this dictation was completed with [employer], the [employer] voice recognition software. Quite often unanticipated grammatical, syntax, homophones, and other interpretive errors are inadvertently transcribed by the computer software. Please disregard these errors. Please excuse any errors that have escaped final proofreading. Thank you.

## 2022-06-16 DIAGNOSIS — E78.5 HYPERLIPIDEMIA, UNSPECIFIED HYPERLIPIDEMIA TYPE: ICD-10-CM

## 2022-06-16 DIAGNOSIS — I10 ESSENTIAL HYPERTENSION: ICD-10-CM

## 2022-06-16 DIAGNOSIS — I25.10 CORONARY ARTERY DISEASE INVOLVING NATIVE CORONARY ARTERY OF NATIVE HEART WITHOUT ANGINA PECTORIS: ICD-10-CM

## 2022-06-16 RX ORDER — ATORVASTATIN CALCIUM 80 MG/1
TABLET, FILM COATED ORAL
Qty: 90 TABLET | Refills: 1 | Status: SHIPPED | OUTPATIENT
Start: 2022-06-16

## 2022-06-16 RX ORDER — ATENOLOL 50 MG/1
TABLET ORAL
Qty: 90 TABLET | Refills: 1 | Status: SHIPPED | OUTPATIENT
Start: 2022-06-16

## 2022-11-15 ENCOUNTER — OFFICE VISIT (OUTPATIENT)
Dept: PRIMARY CARE CLINIC | Age: 72
End: 2022-11-15
Payer: MEDICARE

## 2022-11-15 VITALS
OXYGEN SATURATION: 98 % | HEART RATE: 64 BPM | DIASTOLIC BLOOD PRESSURE: 82 MMHG | HEIGHT: 69 IN | SYSTOLIC BLOOD PRESSURE: 153 MMHG | RESPIRATION RATE: 12 BRPM | WEIGHT: 178 LBS | BODY MASS INDEX: 26.36 KG/M2 | TEMPERATURE: 97.1 F

## 2022-11-15 DIAGNOSIS — E66.3 OVERWEIGHT (BMI 25.0-29.9): ICD-10-CM

## 2022-11-15 DIAGNOSIS — Z79.899 MEDICATION MANAGEMENT: ICD-10-CM

## 2022-11-15 DIAGNOSIS — I25.810 CORONARY ARTERY DISEASE INVOLVING CORONARY BYPASS GRAFT OF NATIVE HEART WITHOUT ANGINA PECTORIS: ICD-10-CM

## 2022-11-15 DIAGNOSIS — I10 ESSENTIAL HYPERTENSION: ICD-10-CM

## 2022-11-15 DIAGNOSIS — R73.03 PREDIABETES: ICD-10-CM

## 2022-11-15 DIAGNOSIS — Z86.69 HISTORY OF CATARACT: ICD-10-CM

## 2022-11-15 DIAGNOSIS — Z76.89 ENCOUNTER TO ESTABLISH CARE: ICD-10-CM

## 2022-11-15 DIAGNOSIS — Z98.890 HISTORY OF INGUINAL HERNIA REPAIR: ICD-10-CM

## 2022-11-15 DIAGNOSIS — N40.0 BENIGN PROSTATIC HYPERPLASIA WITHOUT LOWER URINARY TRACT SYMPTOMS: ICD-10-CM

## 2022-11-15 DIAGNOSIS — I10 ESSENTIAL HYPERTENSION: Primary | ICD-10-CM

## 2022-11-15 DIAGNOSIS — Z87.19 HISTORY OF INGUINAL HERNIA REPAIR: ICD-10-CM

## 2022-11-15 DIAGNOSIS — E78.5 HYPERLIPIDEMIA, UNSPECIFIED HYPERLIPIDEMIA TYPE: ICD-10-CM

## 2022-11-15 PROCEDURE — 3074F SYST BP LT 130 MM HG: CPT | Performed by: NURSE PRACTITIONER

## 2022-11-15 PROCEDURE — 3078F DIAST BP <80 MM HG: CPT | Performed by: NURSE PRACTITIONER

## 2022-11-15 PROCEDURE — 1123F ACP DISCUSS/DSCN MKR DOCD: CPT | Performed by: NURSE PRACTITIONER

## 2022-11-15 PROCEDURE — 99204 OFFICE O/P NEW MOD 45 MIN: CPT | Performed by: NURSE PRACTITIONER

## 2022-11-15 RX ORDER — CYCLOSPORINE 0.5 MG/ML
EMULSION OPHTHALMIC
COMMUNITY
Start: 2022-09-27

## 2022-11-15 NOTE — PROGRESS NOTES
[facility]   [address], [address] [facility]  P: [phone]  F: [phone]    SUBJECTIVE     HPI:     Kay Leslie is a 67 y.o. male who is seen in the clinic for   Chief Complaint   Patient presents with    Establish Care      The patient presents today to establish care and for management of his co-morbidities. CAD s/p CABG X5: Lipid Panel in November 2021 was unremarkable. Currently on Lipitor 80 mg QHS. Sees Dr. Roxane Benavides (Cardiology) routinely for management. HTN: BP is 153/82 after re-check. Endorses [place] HTN. Has not been checking his BP at home recently. Currently on Atenolol 50 mg every day. Sees Dr. Roxane Benavides (Cardiology) routinely for management. Sees Dr. Taylor Thompson (Urology) yearly for management of BPH. Previous hx of Bilateral Cataracts Removal in 2021. Sees Dr. Davonte Ackerman (Opthamology) yearly. Hx of Right Inguinal Hernia Repair. HA1C was 6 in November, 2021. Diet is well rounded. Exercises routinely. BMI is 26.29. Family History: CAD, HLD, MI, CVA, HTN, DVT/PE, Hypothyroidism    Previous PCP: Dr. Mryiam Alvarez  Specialists: Cardiology, Opthalmology, Urology. Pertinent health screenings: Up to date. Immunizations: Up to date. 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 Lock's cardiac history dates back to September 2004 when he presented with progressive angina, had an abnormal stress test and then cardiac catheterization, which showed significant left main and three-vessel coronary artery disease. He then underwent five-vessel bypass using LIMA and a free radial artery.  LV function was normal.      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    Uveitis of right eye 2022     Past Surgical History:   Procedure Laterality Date    COLONOSCOPY N/A 2016    COLONOSCOPY performed by Tushar Quispe MD at [facility] ENDOSCOPY    ENDOSCOPY, COLON, DIAGNOSTIC      no polyps     HX BLADDER REPAIR  2018    bladder resection    HX COLONOSCOPY      HX CORONARY ARTERY BYPASS GRAFT      x 5 arteries     HX CYST REMOVAL      HX HERNIA REPAIR Left 2020    HX OTHER SURGICAL      pilonidal cyst removed    HX TONSILLECTOMY      HX TONSILLECTOMY      AS A YOUNG CHILD    HX UROLOGICAL      CYSTOSCOPY, HAD BLADDER \"POLYP\" REMOVED    LA CARDIAC SURG PROCEDURE UNLIST      CABG x 5 vessels     Social History     Socioeconomic History    Marital status:      Spouse name: Not on file    Number of children: Not on file    Years of education: Not on file    Highest education level: Not on file   Occupational History    Not on file   Tobacco Use    Smoking status: Former     Packs/day: 1.00     Years: 12.00     Pack years: 12.00     Types: Cigarettes     Quit date: 3/31/1980     Years since quittin.6    Smokeless tobacco: Never   Vaping Use    Vaping Use: Never used Substance and Sexual Activity    Alcohol use:  Yes     Alcohol/week: 5.0 standard drinks     Types: 5 Glasses of wine per week     Comment: occasional    Drug use: No    Sexual activity: Yes     Partners: Female   Other Topics Concern    Not on file   Social History Narrative    Not on file     Social Determinants of Health     Financial Resource Strain: Not on file   Food Insecurity: Not on file   Transportation Needs: Not on file   Physical Activity: Insufficiently Active    Days of Exercise per Week: 2 days    Minutes of Exercise per Session: 40 min   Stress: Not on file   Social Connections: Not on file   Intimate Partner Violence: Not At Risk    Fear of Current or Ex-Partner: No    Emotionally Abused: No    Physically Abused: No    Sexually Abused: No   Housing Stability: Not on file     Family History   Problem Relation Age of Onset    OSTEOARTHRITIS Mother     Hypertension Mother     Stroke Mother     High Cholesterol Mother     Cataract Mother     Other Mother         duodenal ulcer    Heart Disease Father     Cataract Father     Thyroid Disease Sister     High Cholesterol Sister     Cataract Sister     No Known Problems Daughter     No Known Problems Daughter     Anesth Problems Neg Hx      Immunization History   Administered Date(s) Administered    COVID-19, MODERNA BLUE border, Primary or Immunocompromised, (age 18y+), IM, 100 mcg/0.5mL 02/14/2021, 03/14/2021    COVID-19, MODERNA Booster BLUE border, (age 18y+), IM, 50mcg/0.25mL 10/27/2021, 10/29/2021, 09/22/2022    Influenza High Dose Vaccine PF 10/19/2017    Influenza Vaccine 11/07/2014, 09/28/2021, 09/22/2022    Influenza Vaccine (Tri) Adjuvanted (>65 Yrs FLUAD TRI 76035) 10/17/2018, 10/11/2019    Influenza Vaccine Split 10/13/2009, 10/11/2010, 10/12/2011, 10/09/2012    Influenza, FLUAD, (age 72 y+), Adjuvanted 09/07/2020    Influenza, FLUARIX, FLULAVAL, FLUZONE (age 10 mo+) AND AFLURIA, (age 1 y+), PF, 0.5mL 10/24/2013, 10/22/2015, 11/03/2016 Pneumococcal Conjugate (PCV-13) 04/02/2015    Pneumococcal Polysaccharide (PPSV-23) 05/12/2016    TDAP Vaccine 09/01/2006    Tdap 10/03/2018    Zoster Recombinant 06/15/2019, 10/25/2019    Zoster Vaccine, Live 09/04/2013      No Known Allergies    No visits with results within 3 Month(s) from this visit. Latest known visit with results is:   Office Visit on 11/15/2021   Component Date Value Ref Range Status    WBC 11/17/2021 6.6  3.4 - 10.8 x10E3/uL Final    RBC 11/17/2021 5.16  4.14 - 5.80 x10E6/uL Final    HGB 11/17/2021 15.8  13.0 - 17.7 g/dL Final    HCT 11/17/2021 47.4  37.5 - 51.0 % Final    MCV 11/17/2021 92  79 - 97 fL Final    MCH 11/17/2021 30.6  26.6 - 33.0 pg Final    MCHC 11/17/2021 33.3  31.5 - 35.7 g/dL Final    RDW 11/17/2021 12.7  11.6 - 15.4 % Final    PLATELET 75/81/5179 140  150 - 450 x10E3/uL Final    NEUTROPHILS 11/17/2021 51  Not Estab. % Final    Lymphocytes 11/17/2021 30  Not Estab. % Final    MONOCYTES 11/17/2021 14  Not Estab. % Final    EOSINOPHILS 11/17/2021 4  Not Estab. % Final    BASOPHILS 11/17/2021 1  Not Estab. % Final    ABS. NEUTROPHILS 11/17/2021 3.4  1.4 - 7.0 x10E3/uL Final    Abs Lymphocytes 11/17/2021 2.0  0.7 - 3.1 x10E3/uL Final    ABS. MONOCYTES 11/17/2021 0.9  0.1 - 0.9 x10E3/uL Final    ABS. EOSINOPHILS 11/17/2021 0.3  0.0 - 0.4 x10E3/uL Final    ABS. BASOPHILS 11/17/2021 0.1  0.0 - 0.2 x10E3/uL Final    IMMATURE GRANULOCYTES 11/17/2021 0  Not Estab. % Final    ABS. IMM. GRANS.  11/17/2021 0.0  0.0 - 0.1 x10E3/uL Final    Glucose 11/17/2021 87  65 - 99 mg/dL Final    BUN 11/17/2021 15  8 - 27 mg/dL Final    Creatinine 11/17/2021 0.99  0.76 - 1.27 mg/dL Final    BUN/Creatinine ratio 11/17/2021 15  10 - 24 Final    Sodium 11/17/2021 142  134 - 144 mmol/L Final    Potassium 11/17/2021 4.4  3.5 - 5.2 mmol/L Final    Chloride 11/17/2021 104  96 - 106 mmol/L Final    CO2 11/17/2021 24  20 - 29 mmol/L Final    Calcium 11/17/2021 9.0  8.6 - 10.2 mg/dL Final    Protein, total 11/17/2021 6.3  6.0 - 8.5 g/dL Final    Albumin 11/17/2021 4.2  3.7 - 4.7 g/dL Final    GLOBULIN, TOTAL 11/17/2021 2.1  1.5 - 4.5 g/dL Final    A-G Ratio 11/17/2021 2.0  1.2 - 2.2 Final    Bilirubin, total 11/17/2021 0.5  0.0 - 1.2 mg/dL Final    Alk. phosphatase 11/17/2021 63  44 - 121 IU/L Final                  **Please note reference interval change**    AST (SGOT) 11/17/2021 26  0 - 40 IU/L Final    ALT (SGPT) 11/17/2021 22  0 - 44 IU/L Final    Cholesterol, total 11/17/2021 129  100 - 199 mg/dL Final    Triglyceride 11/17/2021 97  0 - 149 mg/dL Final    HDL Cholesterol 11/17/2021 45  >39 mg/dL Final    VLDL, calculated 11/17/2021 18  5 - 40 mg/dL Final    LDL, calculated 11/17/2021 66  0 - 99 mg/dL Final    Hemoglobin A1c 11/17/2021 6.0 (A)  4.8 - 5.6 % Final    Comment:          Prediabetes: 5.7 - 6.4           Diabetes: >6.4           Glycemic control for adults with diabetes: <7.0      Estimated average glucose 11/17/2021 126  mg/dL Final    Prostate Specific Ag 11/17/2021 0.6  0.0 - 4.0 ng/mL Final    Comment: Roche ECLIA methodology. According to the American Urological Association, Serum PSA should  decrease and remain at undetectable levels after radical  prostatectomy. The AUA defines biochemical recurrence as an initial  PSA value 0.2 ng/mL or greater followed by a subsequent confirmatory  PSA value 0.2 ng/mL or greater. Values obtained with different assay methods or kits cannot be used  interchangeably. Results cannot be interpreted as absolute evidence  of the presence or absence of malignant disease. VITAMIN D, 25-HYDROXY 11/17/2021 36.3  30.0 - 100.0 ng/mL Final    Comment: Vitamin D deficiency has been defined by the [address] practice guideline as a  level of serum 25-OH vitamin D less than 20 ng/mL (1,2). The Endocrine Society went on to further define vitamin D  insufficiency as a level between 21 and 29 ng/mL (2). 1. IOM ([place] of Medicine). 2010. Dietary reference     intakes for calcium and D. [facility]: The     [employer]. 2. Minnie MF, Shanda NC, Manisha ALEXANDER, et al.     Evaluation, treatment, and prevention of vitamin D     deficiency: an Endocrine Society clinical practice     guideline. JCEM. 2011 Jul; 96(7):1911-30. Creatine Kinase,Total 11/17/2021 132  41 - 331 U/L Final      US EXAM SCREENING AAA  Narrative: EXAM:  US EXAM SCREENING AAA    INDICATION: Screening for aortic aneurysm    COMPARISON: None. FINDINGS: Limited ultrasound imaging of the abdomen was performed to assess for  abdominal aortic aneurysm. The abdominal aorta is normal in caliber, measuring  1.8 x 2.1 cm in diameter proximally, 1.6 x 1.6 cm in diameter in its midportion,  and 1.4 x 1.3 cm in diameter distally. The common iliac arteries are normal in  caliber. Impression: IMPRESSION:   No evidence of abdominal aortic aneurysm. Current Outpatient Medications   Medication Sig Dispense Refill    atorvastatin (LIPITOR) 80 mg tablet TAKE 1 TABLET DAILY 90 Tablet 1    ibuprofen 200 mg cap Take 600 mg by mouth daily as needed for Pain. calcium citrate/vitamin D3 (CITRACAL + D PO) Take 1 Tab by mouth daily. VITAMIN B COMPLEX (B COMPLEX PO) Take 1 Tab by mouth daily. MULTIVITAMIN PO Take 1 Tab by mouth daily. FOLIC ACID PO Take 5 mg by mouth daily. coenzyme Q-10 (CO Q-10) 200 mg capsule Take 1 Cap by mouth daily. 30 Cap 11    aspirin (ASPIRIN) 325 mg tablet Take 325 mg by mouth daily. Restasis 0.05 % dpet INSTILL 1 DROP INTO BOTH EYES TWICE A DAY             The past medical history, past surgical history, and family history were reviewed and updated in the medical record. Lab values/Imaging were reviewed. The medications were reviewed and updated in the medical record. Immunizations were reviewed and updated in the medical record.   All relevant preventative screenings reviewed and updated in the medical record. REVIEW OF SYSTEMS   Review of Systems   Constitutional:  Negative for malaise/fatigue and weight loss. HENT:  Negative for congestion and sore throat. Eyes:  Negative for blurred vision. Respiratory:  Negative for cough and shortness of breath. Cardiovascular:  Negative for chest pain and leg swelling. Gastrointestinal:  Negative for constipation and heartburn. Genitourinary:  Negative for frequency and urgency. Musculoskeletal:  Negative for back pain, joint pain and myalgias. Neurological:  Negative for dizziness and headaches. Psychiatric/Behavioral:  Negative for depression. The patient is not nervous/anxious and does not have insomnia. PHYSICAL EXAM   BP (!) 153/82 (BP 1 Location: Right arm, BP Patient Position: Sitting, BP Cuff Size: Adult)   Pulse 64   Temp 97.1 °F (36.2 °C) (Temporal)   Resp 12   Ht 5' 9\" (1.753 m)   Wt 178 lb (80.7 kg)   SpO2 98%   BMI 26.29 kg/m²      Physical Exam  Vitals and nursing note reviewed. Constitutional:       General: He is not in acute distress. Appearance: Normal appearance. He is well-developed. He is not diaphoretic. HENT:      Head: Normocephalic and atraumatic. Right Ear: Tympanic membrane, ear canal and external ear normal.      Left Ear: Tympanic membrane, ear canal and external ear normal.      Mouth/Throat:      Mouth: Mucous membranes are moist.      Pharynx: Oropharynx is clear. Eyes:      General: No scleral icterus. Right eye: No discharge. Left eye: No discharge. Extraocular Movements: Extraocular movements intact. Conjunctiva/sclera: Conjunctivae normal.   Neck:      Thyroid: No thyromegaly. Cardiovascular:      Rate and Rhythm: Normal rate and regular rhythm. Pulses: Normal pulses. Dorsalis pedis pulses are 2+ on the right side and 2+ on the left side. Heart sounds: Normal heart sounds.    Pulmonary:      Effort: Pulmonary effort is normal.      Breath sounds: Normal breath sounds. No wheezing. Abdominal:      General: Bowel sounds are normal. There is no distension. Palpations: Abdomen is soft. Tenderness: There is no abdominal tenderness. Musculoskeletal:      Cervical back: Normal range of motion and neck supple. Right lower leg: No edema. Left lower leg: No edema. Comments: B/L knees without crepitus   Lymphadenopathy:      Cervical: No cervical adenopathy. Neurological:      Deep Tendon Reflexes:      Reflex Scores:       Patellar reflexes are 2+ on the right side and 2+ on the left side.  exam deferred. ASSESSMENT/ PLAN   Below is the assessment and plan developed based on review of pertinent history, physical exam, labs, studies, and medications. 1. Essential hypertension  Patient is reluctant to increase Atenolol at this time. Advised patient to take BP at home and report back readings. If around range today, will increase Atenolol. Proper precautions discussed. Elevated BP today could be due to [place] HTN. Limit Na intake. -     METABOLIC PANEL, COMPREHENSIVE; Future  -     MICROALBUMIN, UR, RAND W/ MICROALB/CREAT RATIO; Future  2. Coronary artery disease involving coronary bypass graft of native heart without angina pectoris  Goal LDL is below 70, pending labs will add on Zetia. -     LIPID PANEL; Future  3. Hyperlipidemia, unspecified hyperlipidemia type  Goal LDL is below 70, pending labs will add on Zetia. -     LIPID PANEL; Future  4. Benign prostatic hyperplasia without lower urinary tract symptoms  Managed primarily by Urology. -     PSA W/ REFLX FREE PSA; Future  5. Prediabetes  If HA1C has risen to 6.3 or above, patient is agreeable to starting Metformin.   -     HEMOGLOBIN A1C WITH EAG; Future  6. Overweight (BMI 25.0-29.9)  -     LIPID PANEL; Future  -     HEMOGLOBIN A1C WITH EAG; Future  7. History of cataract  Denies any complications.    8. History of inguinal hernia repair  Denies any complications. 9. Medication management  -     METABOLIC PANEL, COMPREHENSIVE; Future  -     CBC WITH AUTOMATED DIFF; Future  10. Encounter to establish care           Follow-up and Dispositions    Return in about 4 months (around 3/15/2023) for Management of co-morbidities. Needs MWV. Pending labs may requrie a sooner appt. Disclaimer:  Advised patient to call back or return to office if symptoms worsen/change/persist.  Discussed expected course/resolution/complications of diagnosis in detail with patient. Medication risks/benefits/alternatives discussed with patient. Patient was given an after visit summary which includes diagnoses, current medications, & vitals. Discussed patient instructions and advised to read to all patient instructions regarding care. Patient expressed understanding with the diagnosis and plan.        Char Kinsey NP  11/15/2022

## 2022-11-15 NOTE — PROGRESS NOTES
Chief Complaint   Patient presents with    [address] Maintenance Due   Topic    Depression Screen     Medicare Yearly Exam     A1C test (Diabetic or Prediabetic)     Lipid Screen         1. \"Have you been to the ER, urgent care clinic since your last visit? Hospitalized since your last visit? \" No    2. \"Have you seen or consulted any other health care providers outside of the [address] [place] [address] since your last visit? \" No     3. For patients aged 39-70: Has the patient had a colonoscopy / FIT/ Cologuard? NA - based on age      If the patient is female:    4. For patients aged 41-77: Has the patient had a mammogram within the past 2 years? NA - based on age or sex      11. For patients aged 21-65: Has the patient had a pap smear?  NA - based on age or sex     Visit Vitals  BP (!) 149/87 (BP 1 Location: Right arm, BP Patient Position: Sitting, BP Cuff Size: Adult)   Pulse 64   Temp 97.1 °F (36.2 °C) (Temporal)   Resp 12   Ht 5' 9\" (1.753 m)   Wt 178 lb (80.7 kg)   SpO2 98%   BMI 26.29 kg/m²

## 2022-11-16 LAB
ALBUMIN SERPL-MCNC: 4.1 G/DL (ref 3.5–5)
ALBUMIN/GLOB SERPL: 1.4 {RATIO} (ref 1.1–2.2)
ALP SERPL-CCNC: 69 U/L (ref 45–117)
ALT SERPL-CCNC: 30 U/L (ref 12–78)
ANION GAP SERPL CALC-SCNC: 5 MMOL/L (ref 5–15)
AST SERPL-CCNC: 29 U/L (ref 15–37)
BASOPHILS # BLD: 0.1 K/UL (ref 0–0.1)
BASOPHILS NFR BLD: 1 % (ref 0–1)
BILIRUB SERPL-MCNC: 0.6 MG/DL (ref 0.2–1)
BUN SERPL-MCNC: 15 MG/DL (ref 6–20)
BUN/CREAT SERPL: 15 (ref 12–20)
CALCIUM SERPL-MCNC: 9.1 MG/DL (ref 8.5–10.1)
CHLORIDE SERPL-SCNC: 106 MMOL/L (ref 97–108)
CHOLEST SERPL-MCNC: 145 MG/DL
CO2 SERPL-SCNC: 29 MMOL/L (ref 21–32)
CREAT SERPL-MCNC: 1.01 MG/DL (ref 0.7–1.3)
CREAT UR-MCNC: 148 MG/DL
DIFFERENTIAL METHOD BLD: ABNORMAL
EOSINOPHIL # BLD: 0.2 K/UL (ref 0–0.4)
EOSINOPHIL NFR BLD: 3 % (ref 0–7)
ERYTHROCYTE [DISTWIDTH] IN BLOOD BY AUTOMATED COUNT: 13.2 % (ref 11.5–14.5)
EST. AVERAGE GLUCOSE BLD GHB EST-MCNC: 117 MG/DL
GLOBULIN SER CALC-MCNC: 3 G/DL (ref 2–4)
GLUCOSE SERPL-MCNC: 93 MG/DL (ref 65–100)
HBA1C MFR BLD: 5.7 % (ref 4–5.6)
HCT VFR BLD AUTO: 48.8 % (ref 36.6–50.3)
HDLC SERPL-MCNC: 50 MG/DL
HDLC SERPL: 2.9 {RATIO} (ref 0–5)
HGB BLD-MCNC: 15.8 G/DL (ref 12.1–17)
IMM GRANULOCYTES # BLD AUTO: 0 K/UL (ref 0–0.04)
IMM GRANULOCYTES NFR BLD AUTO: 0 % (ref 0–0.5)
LDLC SERPL CALC-MCNC: 74.8 MG/DL (ref 0–100)
LYMPHOCYTES # BLD: 1.4 K/UL (ref 0.8–3.5)
LYMPHOCYTES NFR BLD: 26 % (ref 12–49)
MCH RBC QN AUTO: 30.3 PG (ref 26–34)
MCHC RBC AUTO-ENTMCNC: 32.4 G/DL (ref 30–36.5)
MCV RBC AUTO: 93.7 FL (ref 80–99)
MICROALBUMIN UR-MCNC: 0.61 MG/DL
MICROALBUMIN/CREAT UR-RTO: 4 MG/G (ref 0–30)
MONOCYTES # BLD: 0.8 K/UL (ref 0–1)
MONOCYTES NFR BLD: 14 % (ref 5–13)
NEUTS SEG # BLD: 3 K/UL (ref 1.8–8)
NEUTS SEG NFR BLD: 56 % (ref 32–75)
NRBC # BLD: 0 K/UL (ref 0–0.01)
NRBC BLD-RTO: 0 PER 100 WBC
PLATELET # BLD AUTO: 178 K/UL (ref 150–400)
PMV BLD AUTO: 10.8 FL (ref 8.9–12.9)
POTASSIUM SERPL-SCNC: 4.7 MMOL/L (ref 3.5–5.1)
PROT SERPL-MCNC: 7.1 G/DL (ref 6.4–8.2)
RBC # BLD AUTO: 5.21 M/UL (ref 4.1–5.7)
SODIUM SERPL-SCNC: 140 MMOL/L (ref 136–145)
TRIGL SERPL-MCNC: 101 MG/DL (ref ?–150)
VLDLC SERPL CALC-MCNC: 20.2 MG/DL
WBC # BLD AUTO: 5.4 K/UL (ref 4.1–11.1)

## 2022-11-16 NOTE — PROGRESS NOTES
HA1C has improved. Continue to watch your carbs/snacks. Lipid Panel is near goal. No changes needed at this time. Limit your red meat/shellfish/fatty food intake. All other labs are unremarkable. Please see me in 1-2 month to re-check your BP.

## 2022-11-17 LAB
PSA SERPL-MCNC: 0.8 NG/ML (ref 0–4)
REFLEX CRITERIA: NORMAL

## 2022-11-28 DIAGNOSIS — I10 ESSENTIAL HYPERTENSION: Primary | ICD-10-CM

## 2022-11-28 DIAGNOSIS — I25.810 CORONARY ARTERY DISEASE INVOLVING CORONARY BYPASS GRAFT OF NATIVE HEART WITHOUT ANGINA PECTORIS: ICD-10-CM

## 2022-11-28 DIAGNOSIS — E78.5 HYPERLIPIDEMIA, UNSPECIFIED HYPERLIPIDEMIA TYPE: ICD-10-CM

## 2022-11-28 RX ORDER — ATORVASTATIN CALCIUM 80 MG/1
80 TABLET, FILM COATED ORAL
Qty: 90 TABLET | Refills: 1 | Status: SHIPPED | OUTPATIENT
Start: 2022-11-28

## 2022-11-28 RX ORDER — ATENOLOL 50 MG/1
50 TABLET ORAL DAILY
Qty: 90 TABLET | Refills: 1 | Status: SHIPPED | OUTPATIENT
Start: 2022-11-28

## 2023-02-13 ENCOUNTER — OFFICE VISIT (OUTPATIENT)
Dept: PRIMARY CARE CLINIC | Age: 73
End: 2023-02-13
Payer: MEDICARE

## 2023-02-13 VITALS
WEIGHT: 178 LBS | BODY MASS INDEX: 26.36 KG/M2 | TEMPERATURE: 97.5 F | RESPIRATION RATE: 12 BRPM | SYSTOLIC BLOOD PRESSURE: 138 MMHG | HEART RATE: 65 BPM | OXYGEN SATURATION: 98 % | HEIGHT: 69 IN | DIASTOLIC BLOOD PRESSURE: 88 MMHG

## 2023-02-13 DIAGNOSIS — E66.3 OVERWEIGHT (BMI 25.0-29.9): ICD-10-CM

## 2023-02-13 DIAGNOSIS — E78.5 HYPERLIPIDEMIA WITH TARGET LDL LESS THAN 70: ICD-10-CM

## 2023-02-13 DIAGNOSIS — I77.9 CAROTID ARTERY DISEASE WITHOUT CEREBRAL INFARCTION (HCC): ICD-10-CM

## 2023-02-13 DIAGNOSIS — I25.810 CORONARY ARTERY DISEASE INVOLVING CORONARY BYPASS GRAFT OF NATIVE HEART WITHOUT ANGINA PECTORIS: Primary | ICD-10-CM

## 2023-02-13 DIAGNOSIS — R73.03 PREDIABETES: ICD-10-CM

## 2023-02-13 DIAGNOSIS — I10 ESSENTIAL HYPERTENSION: ICD-10-CM

## 2023-02-13 DIAGNOSIS — Z86.16 HISTORY OF COVID-19: ICD-10-CM

## 2023-02-13 PROCEDURE — G8417 CALC BMI ABV UP PARAM F/U: HCPCS | Performed by: NURSE PRACTITIONER

## 2023-02-13 PROCEDURE — 3079F DIAST BP 80-89 MM HG: CPT | Performed by: NURSE PRACTITIONER

## 2023-02-13 PROCEDURE — G8427 DOCREV CUR MEDS BY ELIG CLIN: HCPCS | Performed by: NURSE PRACTITIONER

## 2023-02-13 PROCEDURE — 3017F COLORECTAL CA SCREEN DOC REV: CPT | Performed by: NURSE PRACTITIONER

## 2023-02-13 PROCEDURE — 1101F PT FALLS ASSESS-DOCD LE1/YR: CPT | Performed by: NURSE PRACTITIONER

## 2023-02-13 PROCEDURE — 3075F SYST BP GE 130 - 139MM HG: CPT | Performed by: NURSE PRACTITIONER

## 2023-02-13 PROCEDURE — G8432 DEP SCR NOT DOC, RNG: HCPCS | Performed by: NURSE PRACTITIONER

## 2023-02-13 PROCEDURE — 1123F ACP DISCUSS/DSCN MKR DOCD: CPT | Performed by: NURSE PRACTITIONER

## 2023-02-13 PROCEDURE — G8536 NO DOC ELDER MAL SCRN: HCPCS | Performed by: NURSE PRACTITIONER

## 2023-02-13 PROCEDURE — 99214 OFFICE O/P EST MOD 30 MIN: CPT | Performed by: NURSE PRACTITIONER

## 2023-02-13 NOTE — PROGRESS NOTES
No chief complaint on file. Health Maintenance Due   Topic    Medicare Yearly Exam         1. \"Have you been to the ER, urgent care clinic since your last visit? Hospitalized since your last visit? \" No    2. \"Have you seen or consulted any other health care providers outside of the [address] [place] [address] since your last visit? \" No     3. For patients aged 39-70: Has the patient had a colonoscopy / FIT/ Cologuard? Yes - no Care Gap present      If the patient is female:    4. For patients aged 41-77: Has the patient had a mammogram within the past 2 years? NA - based on age or sex      11. For patients aged 21-65: Has the patient had a pap smear?  NA - based on age or sex     Visit Vitals  BP (!) 157/100 (BP 1 Location: Left upper arm, BP Patient Position: Sitting, BP Cuff Size: Adult)   Pulse 65   Temp 97.5 °F (36.4 °C) (Temporal)   Resp 12   Ht 5' 9\" (1.753 m)   Wt 178 lb (80.7 kg)   SpO2 98%   BMI 26.29 kg/m²

## 2023-02-13 NOTE — PROGRESS NOTES
[facility]   [address], [address]  P: [phone]  F: [phone]    SUBJECTIVE     HPI:     Deena Michel is a 67 y.o. male who is seen in the clinic for   Chief Complaint   Patient presents with    Follow-up        The patient presents today for management of his co-morbidities. Hx of HLD/ CAD s/p CABG X5. LDL was 94 in November. Currently rx'd Lipitor 80 mg QHS. Managed primarily by Dr. Nadia Gonzalez (Cardiology). Hx of Carotid Atherosclerosis. LDL was 94 in November. Currently rx'd Lipitor 80 mg QHS. Managed primarily by Dr. Nadia Gonzalez (Cardiology). Hx of HTN. BP is 138/88 today. BP at home 120-130s/80s. Currently rx'd Atenolol 50 mg every day. Hx of Prediabetes. HA1C was 5.7 in November. Dx w/ Covid-19 in November. Denies any complications. 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 Lock's cardiac history dates back to September 2004 when he presented with progressive angina, had an abnormal stress test and then cardiac catheterization, which showed significant left main and three-vessel coronary artery disease. He then underwent five-vessel bypass using LIMA and a free radial artery.  LV function was normal.      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    Uveitis of right eye 2022     Past Surgical History:   Procedure Laterality Date    COLONOSCOPY N/A 2016    COLONOSCOPY performed by Erica Cheek MD at [facility], COLON, DIAGNOSTIC      no polyps     HX BLADDER REPAIR  2018    bladder resection    HX COLONOSCOPY      HX CORONARY ARTERY BYPASS GRAFT      x 5 arteries     HX CYST REMOVAL      HX HERNIA REPAIR Left 2020    HX OTHER SURGICAL      pilonidal cyst removed    HX TONSILLECTOMY      HX TONSILLECTOMY      AS A YOUNG CHILD    HX UROLOGICAL      CYSTOSCOPY, HAD BLADDER \"POLYP\" REMOVED    MA UNLISTED PROCEDURE CARDIAC SURGERY      CABG x 5 vessels     Social History     Socioeconomic History    Marital status:      Spouse name: Not on file    Number of children: Not on file    Years of education: Not on file    Highest education level: Not on file   Occupational History    Not on file   Tobacco Use    Smoking status: Former     Packs/day: 1.00     Years: 12.00     Pack years: 12.00     Types: Cigarettes     Quit date: 3/31/1980     Years since quittin.9    Smokeless tobacco: Never   Vaping Use    Vaping Use: Never used   Substance and Sexual Activity    Alcohol use:  Yes     Alcohol/week: 5.0 standard drinks     Types: 5 Glasses of wine per week     Comment: occasional    Drug use: No    Sexual activity: Yes     Partners: Female   Other Topics Concern    Not on file   Social History Narrative    Not on file     Social Determinants of Health     Financial Resource Strain: Not on file   Food Insecurity: Not on file Transportation Needs: Not on file   Physical Activity: Insufficiently Active    Days of Exercise per Week: 2 days    Minutes of Exercise per Session: 40 min   Stress: Not on file   Social Connections: Not on file   Intimate Partner Violence: Not At Risk    Fear of Current or Ex-Partner: No    Emotionally Abused: No    Physically Abused: No    Sexually Abused: No   Housing Stability: Not on file     Family History   Problem Relation Age of Onset    OSTEOARTHRITIS Mother     Hypertension Mother     Stroke Mother     High Cholesterol Mother     Cataract Mother     Other Mother         duodenal ulcer    Heart Disease Father     Cataract Father     Thyroid Disease Sister     High Cholesterol Sister     Cataract Sister     No Known Problems Daughter     No Known Problems Daughter     Anesth Problems Neg Hx      Immunization History   Administered Date(s) Administered    COVID-19, MODERNA BLUE border, Primary or Immunocompromised, (age 18y+), IM, 100 mcg/0.5mL 02/14/2021, 03/14/2021    COVID-19, MODERNA Booster BLUE border, (age 18y+), IM, 50mcg/0.25mL 10/27/2021, 10/29/2021, 09/22/2022    Influenza High Dose Vaccine PF 10/19/2017    Influenza Vaccine 11/07/2014, 09/28/2021, 09/22/2022    Influenza Vaccine (Tri) Adjuvanted (>65 Yrs FLUAD TRI 55959) 10/17/2018, 10/11/2019    Influenza Vaccine Split 10/13/2009, 10/11/2010, 10/12/2011, 10/09/2012    Influenza, FLUAD, (age 72 y+), Adjuvanted 09/07/2020    Influenza, FLUARIX, FLULAVAL, FLUZONE (age 10 mo+) AND AFLURIA, (age 1 y+), PF, 0.5mL 10/24/2013, 10/22/2015, 11/03/2016    Pneumococcal Conjugate (PCV-13) 04/02/2015    Pneumococcal Polysaccharide (PPSV-23) 05/12/2016    TDAP Vaccine 09/01/2006    Tdap 10/03/2018    Zoster Recombinant 06/15/2019, 10/25/2019    Zoster Vaccine, Live 09/04/2013      No Known Allergies    Orders Only on 11/15/2022   Component Date Value Ref Range Status    Microalbumin,urine random 11/15/2022 0.61  MG/DL Final    No reference range has been established. Creatinine, urine random 11/15/2022 148.00  mg/dL Final    No reference range has been established. Microalbumin/Creat ratio (mg/g cre* 11/15/2022 4  0 - 30 mg/g Final    Prostate Specific Ag 11/15/2022 0.8  0.0 - 4.0 ng/mL Final    Comment: (NOTE)  Roche ECLIA methodology. According to the American Urological Association, Serum PSA should  decrease and remain at undetectable levels after radical  prostatectomy. The AUA defines biochemical recurrence as an initial  PSA value 0.2 ng/mL or greater followed by a subsequent  confirmatory PSA value 0.2 ng/mL or greater. Values obtained with different assay methods or kits cannot be used  interchangeably. Results cannot be interpreted as absolute evidence  of the presence or absence of malignant disease. Reflex Criteria 11/15/2022 Comment    Final    Comment: (NOTE)  The percent free PSA is performed on a reflex basis only when the  total PSA is between 4.0 and 10.0 ng/mL. Performed At: [address] [id number]  Dayan Dale MD VX:[id number]      Hemoglobin A1c 11/15/2022 5.7 (A)  4.0 - 5.6 % Final    Comment: NEW METHOD  PLEASE NOTE NEW REFERENCE RANGE  (NOTE)  HbA1C Interpretive Ranges  <5.7              Normal  5.7 - 6.4         Consider Prediabetes  >6.5              Consider Diabetes      Est. average glucose 11/15/2022 117  mg/dL Final    Cholesterol, total 11/15/2022 145  <200 MG/DL Final    Triglyceride 11/15/2022 101  <150 MG/DL Final    Based on NCEP-ATP III:  Triglycerides <150 mg/dL  is considered normal, 150-199 mg/dL  borderline high,  200-499 mg/dL high and  greater than or equal to 500 mg/dL very high. HDL Cholesterol 11/15/2022 50  MG/DL Final    Based on NCEP ATP III, HDL Cholesterol <40 mg/dL is considered low and >60 mg/dL is elevated.     LDL, calculated 11/15/2022 74.8  0 - 100 MG/DL Final    Comment: Based on the NCEP-ATP: LDL-C concentrations are considered  optimal <100 mg/dL,  near optimal/above Normal 100-129 mg/dL  Borderline High: 130-159, High: 160-189 mg/dL  Very High: Greater than or equal to 190 mg/dL      VLDL, calculated 11/15/2022 20.2  MG/DL Final    CHOL/HDL Ratio 11/15/2022 2.9  0.0 - 5.0   Final    WBC 11/15/2022 5.4  4.1 - 11.1 K/uL Final    RBC 11/15/2022 5.21  4.10 - 5.70 M/uL Final    HGB 11/15/2022 15.8  12.1 - 17.0 g/dL Final    HCT 11/15/2022 48.8  36.6 - 50.3 % Final    MCV 11/15/2022 93.7  80.0 - 99.0 FL Final    MCH 11/15/2022 30.3  26.0 - 34.0 PG Final    MCHC 11/15/2022 32.4  30.0 - 36.5 g/dL Final    RDW 11/15/2022 13.2  11.5 - 14.5 % Final    PLATELET 88/23/1723 737  150 - 400 K/uL Final    MPV 11/15/2022 10.8  8.9 - 12.9 FL Final    NRBC 11/15/2022 0.0  0  WBC Final    ABSOLUTE NRBC 11/15/2022 0.00  0.00 - 0.01 K/uL Final    NEUTROPHILS 11/15/2022 56  32 - 75 % Final    LYMPHOCYTES 11/15/2022 26  12 - 49 % Final    MONOCYTES 11/15/2022 14 (A)  5 - 13 % Final    EOSINOPHILS 11/15/2022 3  0 - 7 % Final    BASOPHILS 11/15/2022 1  0 - 1 % Final    IMMATURE GRANULOCYTES 11/15/2022 0  0.0 - 0.5 % Final    ABS. NEUTROPHILS 11/15/2022 3.0  1.8 - 8.0 K/UL Final    ABS. LYMPHOCYTES 11/15/2022 1.4  0.8 - 3.5 K/UL Final    ABS. MONOCYTES 11/15/2022 0.8  0.0 - 1.0 K/UL Final    ABS. EOSINOPHILS 11/15/2022 0.2  0.0 - 0.4 K/UL Final    ABS. BASOPHILS 11/15/2022 0.1  0.0 - 0.1 K/UL Final    ABS. IMM. GRANS.  11/15/2022 0.0  0.00 - 0.04 K/UL Final    DF 11/15/2022 AUTOMATED    Final    Sodium 11/15/2022 140  136 - 145 mmol/L Final    Potassium 11/15/2022 4.7  3.5 - 5.1 mmol/L Final    Chloride 11/15/2022 106  97 - 108 mmol/L Final    CO2 11/15/2022 29  21 - 32 mmol/L Final    Anion gap 11/15/2022 5  5 - 15 mmol/L Final    Glucose 11/15/2022 93  65 - 100 mg/dL Final    BUN 11/15/2022 15  6 - 20 MG/DL Final    Creatinine 11/15/2022 1.01  0.70 - 1.30 MG/DL Final    BUN/Creatinine ratio 11/15/2022 15  12 - 20   Final    eGFR 11/15/2022 >60  >60 ml/min/1.73m2 Final Comment:   Pediatric calculator link: Suki.at. org/professionals/kdoqi/gfr_calculatorped    Effective Oct 3, 2022    These results are not intended for use in patients <25years of age. eGFR results are calculated without a race factor using  the 2021 CKD-EPI equation. Careful clinical correlation is recommended, particularly when comparing to results calculated using previous equations. The CKD-EPI equation is less accurate in patients with extremes of muscle mass, extra-renal metabolism of creatinine, excessive creatine ingestion, or following therapy that affects renal tubular secretion. Calcium 11/15/2022 9.1  8.5 - 10.1 MG/DL Final    Bilirubin, total 11/15/2022 0.6  0.2 - 1.0 MG/DL Final    ALT (SGPT) 11/15/2022 30  12 - 78 U/L Final    AST (SGOT) 11/15/2022 29  15 - 37 U/L Final    Alk. phosphatase 11/15/2022 69  45 - 117 U/L Final    Protein, total 11/15/2022 7.1  6.4 - 8.2 g/dL Final    Albumin 11/15/2022 4.1  3.5 - 5.0 g/dL Final    Globulin 11/15/2022 3.0  2.0 - 4.0 g/dL Final    A-G Ratio 11/15/2022 1.4  1.1 - 2.2   Final      US EXAM SCREENING AAA  Narrative: EXAM:  US EXAM SCREENING AAA    INDICATION: Screening for aortic aneurysm    COMPARISON: None. FINDINGS: Limited ultrasound imaging of the abdomen was performed to assess for  abdominal aortic aneurysm. The abdominal aorta is normal in caliber, measuring  1.8 x 2.1 cm in diameter proximally, 1.6 x 1.6 cm in diameter in its midportion,  and 1.4 x 1.3 cm in diameter distally. The common iliac arteries are normal in  caliber. Impression: IMPRESSION:   No evidence of abdominal aortic aneurysm. Current Outpatient Medications   Medication Sig Dispense Refill    atorvastatin (LIPITOR) 80 mg tablet Take 1 Tablet by mouth nightly. 90 Tablet 1    atenoloL (TENORMIN) 50 mg tablet Take 1 Tablet by mouth daily.  90 Tablet 1    Restasis 0.05 % dpet INSTILL 1 DROP INTO BOTH EYES TWICE A DAY      ibuprofen 200 mg cap Take 600 mg by mouth daily as needed for Pain. calcium citrate/vitamin D3 (CITRACAL + D PO) Take 1 Tab by mouth daily. VITAMIN B COMPLEX (B COMPLEX PO) Take 1 Tab by mouth daily. MULTIVITAMIN PO Take 1 Tab by mouth daily. FOLIC ACID PO Take 5 mg by mouth daily. coenzyme Q-10 (CO Q-10) 200 mg capsule Take 1 Cap by mouth daily. 30 Cap 11    aspirin (ASPIRIN) 325 mg tablet Take 325 mg by mouth daily. The past medical history, past surgical history, and family history were reviewed and updated in the medical record. Lab values/Imaging were reviewed. The medications were reviewed and updated in the medical record. Immunizations were reviewed and updated in the medical record. All relevant preventative screenings reviewed and updated in the medical record. REVIEW OF SYSTEMS   Review of Systems   Constitutional:  Negative for malaise/fatigue. Eyes:  Negative for blurred vision and double vision. Respiratory:  Negative for cough, shortness of breath and wheezing. Cardiovascular:  Negative for chest pain and palpitations. Gastrointestinal:  Negative for heartburn, nausea and vomiting. Neurological:  Negative for dizziness, weakness and headaches. PHYSICAL EXAM   /88   Pulse 65   Temp 97.5 °F (36.4 °C) (Temporal)   Resp 12   Ht 5' 9\" (1.753 m)   Wt 178 lb (80.7 kg)   SpO2 98%   BMI 26.29 kg/m²      Physical Exam  Constitutional:       General: He is not in acute distress. Appearance: He is not ill-appearing or diaphoretic. Eyes:      Extraocular Movements: Extraocular movements intact. Pupils: Pupils are equal, round, and reactive to light. Cardiovascular:      Rate and Rhythm: Normal rate and regular rhythm. Pulses: Normal pulses. Heart sounds: Normal heart sounds. No murmur heard. Pulmonary:      Effort: Pulmonary effort is normal.      Breath sounds: Normal breath sounds. Abdominal:      General: Abdomen is flat.  Bowel sounds are normal. There is no distension. Palpations: Abdomen is soft. There is no mass. Tenderness: There is no abdominal tenderness. There is no guarding or rebound. Hernia: No hernia is present. Skin:     Capillary Refill: Capillary refill takes less than 2 seconds. Neurological:      General: No focal deficit present. Mental Status: He is alert. Cranial Nerves: No cranial nerve deficit. Sensory: No sensory deficit. Motor: No weakness. Coordination: Coordination normal.   Psychiatric:         Mood and Affect: Mood normal.         Behavior: Behavior normal.          ASSESSMENT/ PLAN   Below is the assessment and plan developed based on review of pertinent history, physical exam, labs, studies, and medications. 1. Coronary artery disease involving coronary bypass graft of native heart without angina pectoris  Managed primarily by Cardiology.   -     LIPID PANEL; Future  2. Carotid artery disease without cerebral infarction [facility])  Managed primarily by Cardiology.    -     LIPID PANEL; Future  3. Hyperlipidemia with target LDL less than 70  Managed primarily by Cardiology. -     CK; Future  -     METABOLIC PANEL, COMPREHENSIVE; Future  -     LIPID PANEL; Future  4. Essential hypertension  Continue with current treatment regimen.   -     METABOLIC PANEL, COMPREHENSIVE; Future  5. Prediabetes  Educated patient about lifestyle modifications to help lower SH7F.   -     METABOLIC PANEL, COMPREHENSIVE; Future  6. Overweight (BMI 98.7-43.0)  -     METABOLIC PANEL, COMPREHENSIVE; Future  -     LIPID PANEL; Future  7. History of COVID-19   Denies any complications. Follow-up and Dispositions    Return in about 6 months (around 8/13/2023) for Management of co-morbidities . Disclaimer:  Advised patient to call back or return to office if symptoms worsen/change/persist.  Discussed expected course/resolution/complications of diagnosis in detail with patient.      Medication risks/benefits/alternatives discussed with patient. Patient was given an after visit summary which includes diagnoses, current medications, & vitals. Discussed patient instructions and advised to read to all patient instructions regarding care. Patient expressed understanding with the diagnosis and plan.        Zee Quezada NP  2/13/2023

## 2023-04-26 DIAGNOSIS — E66.3 OVERWEIGHT (BMI 25.0-29.9): ICD-10-CM

## 2023-04-26 DIAGNOSIS — I77.9 CAROTID ARTERY DISEASE WITHOUT CEREBRAL INFARCTION (HCC): ICD-10-CM

## 2023-04-26 DIAGNOSIS — I25.810 CORONARY ARTERY DISEASE INVOLVING CORONARY BYPASS GRAFT OF NATIVE HEART WITHOUT ANGINA PECTORIS: ICD-10-CM

## 2023-04-26 DIAGNOSIS — E78.5 HYPERLIPIDEMIA WITH TARGET LDL LESS THAN 70: ICD-10-CM

## 2023-04-26 DIAGNOSIS — R73.03 PREDIABETES: ICD-10-CM

## 2023-04-26 DIAGNOSIS — I10 ESSENTIAL HYPERTENSION: ICD-10-CM

## 2023-04-27 LAB
ALBUMIN SERPL-MCNC: 3.8 G/DL (ref 3.5–5)
ALBUMIN/GLOB SERPL: 1.3 (ref 1.1–2.2)
ALP SERPL-CCNC: 62 U/L (ref 45–117)
ALT SERPL-CCNC: 28 U/L (ref 12–78)
ANION GAP SERPL CALC-SCNC: 4 MMOL/L (ref 5–15)
AST SERPL-CCNC: 29 U/L (ref 15–37)
BILIRUB SERPL-MCNC: 0.5 MG/DL (ref 0.2–1)
BUN SERPL-MCNC: 17 MG/DL (ref 6–20)
BUN/CREAT SERPL: 18 (ref 12–20)
CALCIUM SERPL-MCNC: 8.9 MG/DL (ref 8.5–10.1)
CHLORIDE SERPL-SCNC: 110 MMOL/L (ref 97–108)
CHOLEST SERPL-MCNC: 131 MG/DL
CK SERPL-CCNC: 131 U/L (ref 39–308)
CO2 SERPL-SCNC: 28 MMOL/L (ref 21–32)
COMMENT, HOLDF: NORMAL
CREAT SERPL-MCNC: 0.93 MG/DL (ref 0.7–1.3)
GLOBULIN SER CALC-MCNC: 3 G/DL (ref 2–4)
GLUCOSE SERPL-MCNC: 92 MG/DL (ref 65–100)
HDLC SERPL-MCNC: 54 MG/DL
HDLC SERPL: 2.4 (ref 0–5)
LDLC SERPL CALC-MCNC: 53.2 MG/DL (ref 0–100)
POTASSIUM SERPL-SCNC: 4.4 MMOL/L (ref 3.5–5.1)
PROT SERPL-MCNC: 6.8 G/DL (ref 6.4–8.2)
SAMPLES BEING HELD,HOLD: NORMAL
SODIUM SERPL-SCNC: 142 MMOL/L (ref 136–145)
TRIGL SERPL-MCNC: 119 MG/DL (ref ?–150)
VLDLC SERPL CALC-MCNC: 23.8 MG/DL

## 2023-05-03 ENCOUNTER — ANCILLARY PROCEDURE (OUTPATIENT)
Dept: CARDIOLOGY CLINIC | Age: 73
End: 2023-05-03
Payer: MEDICARE

## 2023-05-03 ENCOUNTER — OFFICE VISIT (OUTPATIENT)
Dept: CARDIOLOGY CLINIC | Age: 73
End: 2023-05-03
Payer: MEDICARE

## 2023-05-03 VITALS
SYSTOLIC BLOOD PRESSURE: 128 MMHG | RESPIRATION RATE: 16 BRPM | DIASTOLIC BLOOD PRESSURE: 82 MMHG | HEART RATE: 67 BPM | OXYGEN SATURATION: 95 % | WEIGHT: 175.6 LBS | HEIGHT: 69 IN | BODY MASS INDEX: 26.01 KG/M2

## 2023-05-03 DIAGNOSIS — E78.5 HYPERLIPIDEMIA WITH TARGET LDL LESS THAN 70: ICD-10-CM

## 2023-05-03 DIAGNOSIS — I77.9 CAROTID ARTERY DISEASE WITHOUT CEREBRAL INFARCTION (HCC): ICD-10-CM

## 2023-05-03 DIAGNOSIS — I10 PRIMARY HYPERTENSION: Primary | ICD-10-CM

## 2023-05-03 DIAGNOSIS — I25.10 CORONARY ARTERY DISEASE INVOLVING NATIVE CORONARY ARTERY OF NATIVE HEART WITHOUT ANGINA PECTORIS: ICD-10-CM

## 2023-05-03 DIAGNOSIS — I10 PRIMARY HYPERTENSION: ICD-10-CM

## 2023-05-03 DIAGNOSIS — R73.09 ELEVATED HEMOGLOBIN A1C: ICD-10-CM

## 2023-05-03 LAB
LEFT CCA DIST DIAS: 19.5 CM/S
LEFT CCA DIST SYS: 77.2 CM/S
LEFT CCA PROX DIAS: 29.4 CM/S
LEFT CCA PROX SYS: 89.5 CM/S
LEFT ECA DIAS: 12.2 CM/S
LEFT ECA SYS: 61.3 CM/S
LEFT ICA DIST DIAS: 17.6 CM/S
LEFT ICA DIST SYS: 49.9 CM/S
LEFT ICA MID DIAS: 16 CM/S
LEFT ICA MID SYS: 50.9 CM/S
LEFT ICA PROX DIAS: 16.9 CM/S
LEFT ICA PROX SYS: 59.4 CM/S
LEFT ICA/CCA SYS: 0.8 NO UNITS
LEFT VERTEBRAL DIAS: 16.9 CM/S
LEFT VERTEBRAL SYS: 46.2 CM/S
RIGHT CCA DIST DIAS: 23.3 CM/S
RIGHT CCA DIST SYS: 88.6 CM/S
RIGHT CCA PROX DIAS: 19.4 CM/S
RIGHT CCA PROX SYS: 83.3 CM/S
RIGHT ECA DIAS: 11.6 CM/S
RIGHT ECA SYS: 88.6 CM/S
RIGHT ICA DIST DIAS: 18.8 CM/S
RIGHT ICA DIST SYS: 50.9 CM/S
RIGHT ICA MID DIAS: 24.5 CM/S
RIGHT ICA MID SYS: 65.1 CM/S
RIGHT ICA PROX DIAS: 12.9 CM/S
RIGHT ICA PROX SYS: 53.3 CM/S
RIGHT ICA/CCA SYS: 0.7 NO UNITS
RIGHT VERTEBRAL DIAS: 8.4 CM/S
RIGHT VERTEBRAL SYS: 31.1 CM/S

## 2023-05-03 PROCEDURE — G0463 HOSPITAL OUTPT CLINIC VISIT: HCPCS | Performed by: SPECIALIST

## 2023-05-03 PROCEDURE — 93880 EXTRACRANIAL BILAT STUDY: CPT | Performed by: SPECIALIST

## 2023-06-04 DIAGNOSIS — E78.5 HYPERLIPIDEMIA, UNSPECIFIED HYPERLIPIDEMIA TYPE: Primary | ICD-10-CM

## 2023-06-04 DIAGNOSIS — I10 ESSENTIAL (PRIMARY) HYPERTENSION: ICD-10-CM

## 2023-06-05 RX ORDER — ATORVASTATIN CALCIUM 80 MG/1
TABLET, FILM COATED ORAL
Qty: 30 TABLET | Refills: 0 | Status: SHIPPED | OUTPATIENT
Start: 2023-06-05

## 2023-06-05 RX ORDER — ATENOLOL 50 MG/1
TABLET ORAL
Qty: 30 TABLET | Refills: 0 | Status: SHIPPED | OUTPATIENT
Start: 2023-06-05

## 2023-06-05 NOTE — TELEPHONE ENCOUNTER
PCP: Hany Crystal APRN - NP    Last Visit 2/13/2023   Future Appointments   Date Time Provider Rosie Balderasi   5/3/2024  9:00 AM MD [facility] Meredith BS AMB       Requested Prescriptions     Pending Prescriptions Disp Refills    atenolol (TENORMIN) 50 MG tablet [Pharmacy Med Name: ATENOLOL TAB 50MG] 90 tablet 1     Sig: TAKE 1 TABLET DAILY    atorvastatin (LIPITOR) 80 MG tablet [Pharmacy Med Name: ATORVASTATIN TAB 80MG] 90 tablet 1     Sig: TAKE 1 TABLET NIGHTLY         Other Comments: Last Refill   Atenolol 11/28/202  Lipitor 11/28/2022

## 2023-06-15 ENCOUNTER — APPOINTMENT (OUTPATIENT)
Facility: HOSPITAL | Age: 73
DRG: 871 | End: 2023-06-15
Payer: MEDICARE

## 2023-06-15 LAB
COMMENT:: NORMAL
FLUAV AG NPH QL IA: NEGATIVE
FLUBV AG NOSE QL IA: NEGATIVE
LACTATE BLD-SCNC: 3.27 MMOL/L (ref 0.4–2)
SARS-COV-2 RDRP RESP QL NAA+PROBE: NOT DETECTED
SOURCE: NORMAL
SPECIMEN HOLD: NORMAL

## 2023-06-15 PROCEDURE — 85025 COMPLETE CBC W/AUTO DIFF WBC: CPT

## 2023-06-15 PROCEDURE — 87804 INFLUENZA ASSAY W/OPTIC: CPT

## 2023-06-15 PROCEDURE — 87040 BLOOD CULTURE FOR BACTERIA: CPT

## 2023-06-15 PROCEDURE — 96367 TX/PROPH/DG ADDL SEQ IV INF: CPT

## 2023-06-15 PROCEDURE — 96366 THER/PROPH/DIAG IV INF ADDON: CPT

## 2023-06-15 PROCEDURE — 96365 THER/PROPH/DIAG IV INF INIT: CPT

## 2023-06-15 PROCEDURE — 80053 COMPREHEN METABOLIC PANEL: CPT

## 2023-06-15 PROCEDURE — 83605 ASSAY OF LACTIC ACID: CPT

## 2023-06-15 PROCEDURE — 71046 X-RAY EXAM CHEST 2 VIEWS: CPT

## 2023-06-15 PROCEDURE — 36415 COLL VENOUS BLD VENIPUNCTURE: CPT

## 2023-06-15 PROCEDURE — 87635 SARS-COV-2 COVID-19 AMP PRB: CPT

## 2023-06-15 PROCEDURE — 93005 ELECTROCARDIOGRAM TRACING: CPT | Performed by: EMERGENCY MEDICINE

## 2023-06-15 PROCEDURE — 99285 EMERGENCY DEPT VISIT HI MDM: CPT

## 2023-06-15 ASSESSMENT — PAIN - FUNCTIONAL ASSESSMENT: PAIN_FUNCTIONAL_ASSESSMENT: NONE - DENIES PAIN

## 2023-06-16 ENCOUNTER — APPOINTMENT (OUTPATIENT)
Facility: HOSPITAL | Age: 73
DRG: 871 | End: 2023-06-16
Payer: MEDICARE

## 2023-06-16 ENCOUNTER — HOSPITAL ENCOUNTER (INPATIENT)
Facility: HOSPITAL | Age: 73
LOS: 6 days | Discharge: HOME HEALTH | DRG: 871 | End: 2023-06-22
Attending: EMERGENCY MEDICINE | Admitting: STUDENT IN AN ORGANIZED HEALTH CARE EDUCATION/TRAINING PROGRAM
Payer: MEDICARE

## 2023-06-16 DIAGNOSIS — R09.02 HYPOXIA: ICD-10-CM

## 2023-06-16 DIAGNOSIS — R53.1 GENERALIZED WEAKNESS: ICD-10-CM

## 2023-06-16 DIAGNOSIS — I25.810 CORONARY ARTERY DISEASE INVOLVING AUTOLOGOUS ARTERY CORONARY BYPASS GRAFT WITHOUT ANGINA PECTORIS: ICD-10-CM

## 2023-06-16 DIAGNOSIS — J18.9 PNEUMONIA OF LEFT LUNG DUE TO INFECTIOUS ORGANISM, UNSPECIFIED PART OF LUNG: Primary | ICD-10-CM

## 2023-06-16 DIAGNOSIS — I47.9 PAROXYSMAL TACHYCARDIA (HCC): ICD-10-CM

## 2023-06-16 PROBLEM — J16.0 CAP (COMMUNITY ACQUIRED PNEUMONIA) DUE TO CHLAMYDIA SPECIES: Status: ACTIVE | Noted: 2023-06-16

## 2023-06-16 LAB
ALBUMIN SERPL-MCNC: 3 G/DL (ref 3.5–5)
ALBUMIN/GLOB SERPL: 0.8 (ref 1.1–2.2)
ALP SERPL-CCNC: 66 U/L (ref 45–117)
ALT SERPL-CCNC: 32 U/L (ref 12–78)
ANION GAP SERPL CALC-SCNC: 8 MMOL/L (ref 5–15)
ARTERIAL PATENCY WRIST A: POSITIVE
AST SERPL-CCNC: 36 U/L (ref 15–37)
BASE DEFICIT BLD-SCNC: 6.9 MMOL/L
BASOPHILS # BLD: 0 K/UL (ref 0–0.1)
BASOPHILS NFR BLD: 0 % (ref 0–1)
BDY SITE: ABNORMAL
BILIRUB SERPL-MCNC: 1.1 MG/DL (ref 0.2–1)
BUN SERPL-MCNC: 17 MG/DL (ref 6–20)
BUN/CREAT SERPL: 11 (ref 12–20)
CALCIUM SERPL-MCNC: 8.7 MG/DL (ref 8.5–10.1)
CHLORIDE SERPL-SCNC: 107 MMOL/L (ref 97–108)
CO2 SERPL-SCNC: 21 MMOL/L (ref 21–32)
COMMENT:: NORMAL
CREAT SERPL-MCNC: 1.51 MG/DL (ref 0.7–1.3)
DIFFERENTIAL METHOD BLD: ABNORMAL
EKG ATRIAL RATE: 127 BPM
EKG ATRIAL RATE: 128 BPM
EKG DIAGNOSIS: NORMAL
EKG DIAGNOSIS: NORMAL
EKG P AXIS: 29 DEGREES
EKG P AXIS: 34 DEGREES
EKG P-R INTERVAL: 112 MS
EKG P-R INTERVAL: 144 MS
EKG Q-T INTERVAL: 292 MS
EKG Q-T INTERVAL: 328 MS
EKG QRS DURATION: 88 MS
EKG QRS DURATION: 88 MS
EKG QTC CALCULATION (BAZETT): 426 MS
EKG QTC CALCULATION (BAZETT): 523 MS
EKG R AXIS: -18 DEGREES
EKG R AXIS: -40 DEGREES
EKG T AXIS: 58 DEGREES
EKG T AXIS: 61 DEGREES
EKG VENTRICULAR RATE: 128 BPM
EKG VENTRICULAR RATE: 153 BPM
EOSINOPHIL # BLD: 0 K/UL (ref 0–0.4)
EOSINOPHIL NFR BLD: 0 % (ref 0–7)
ERYTHROCYTE [DISTWIDTH] IN BLOOD BY AUTOMATED COUNT: 13.6 % (ref 11.5–14.5)
GAS FLOW.O2 O2 DELIVERY SYS: ABNORMAL
GLOBULIN SER CALC-MCNC: 4 G/DL (ref 2–4)
GLUCOSE SERPL-MCNC: 100 MG/DL (ref 65–100)
HCO3 BLD-SCNC: 15 MMOL/L (ref 22–26)
HCT VFR BLD AUTO: 48 % (ref 36.6–50.3)
HGB BLD-MCNC: 15.7 G/DL (ref 12.1–17)
IMM GRANULOCYTES # BLD AUTO: 0 K/UL
IMM GRANULOCYTES NFR BLD AUTO: 0 %
LACTATE SERPL-SCNC: 3.7 MMOL/L (ref 0.4–2)
LACTATE SERPL-SCNC: 4 MMOL/L (ref 0.4–2)
LACTATE SERPL-SCNC: 4.6 MMOL/L (ref 0.4–2)
LACTATE SERPL-SCNC: 6 MMOL/L (ref 0.4–2)
LYMPHOCYTES # BLD: 0.7 K/UL (ref 0.8–3.5)
LYMPHOCYTES NFR BLD: 8 % (ref 12–49)
MCH RBC QN AUTO: 30.1 PG (ref 26–34)
MCHC RBC AUTO-ENTMCNC: 32.7 G/DL (ref 30–36.5)
MCV RBC AUTO: 92 FL (ref 80–99)
METAMYELOCYTES NFR BLD MANUAL: 1 %
MONOCYTES # BLD: 0.4 K/UL (ref 0–1)
MONOCYTES NFR BLD: 5 % (ref 5–13)
NEUTS BAND NFR BLD MANUAL: 9 % (ref 0–6)
NEUTS SEG # BLD: 7.1 K/UL (ref 1.8–8)
NEUTS SEG NFR BLD: 77 % (ref 32–75)
NRBC # BLD: 0 K/UL (ref 0–0.01)
NRBC BLD-RTO: 0 PER 100 WBC
NT PRO BNP: 3225 PG/ML
PCO2 BLD: 21.8 MMHG (ref 35–45)
PH BLD: 7.45 (ref 7.35–7.45)
PLATELET # BLD AUTO: 125 K/UL (ref 150–400)
PLATELET COMMENT: ABNORMAL
PMV BLD AUTO: 11.5 FL (ref 8.9–12.9)
PO2 BLD: 55 MMHG (ref 80–100)
POTASSIUM SERPL-SCNC: 4.4 MMOL/L (ref 3.5–5.1)
PROCALCITONIN SERPL-MCNC: 58.38 NG/ML
PROT SERPL-MCNC: 7 G/DL (ref 6.4–8.2)
RBC # BLD AUTO: 5.22 M/UL (ref 4.1–5.7)
RBC MORPH BLD: ABNORMAL
SAO2 % BLD: 90.4 % (ref 92–97)
SODIUM SERPL-SCNC: 136 MMOL/L (ref 136–145)
SPECIMEN HOLD: NORMAL
SPECIMEN TYPE: ABNORMAL
TROPONIN I SERPL HS-MCNC: 143 NG/L (ref 0–57)
TROPONIN I SERPL HS-MCNC: 352 NG/L (ref 0–57)
TSH SERPL DL<=0.05 MIU/L-ACNC: 1.54 UIU/ML (ref 0.36–3.74)
WBC # BLD AUTO: 8.2 K/UL (ref 4.1–11.1)

## 2023-06-16 PROCEDURE — 84484 ASSAY OF TROPONIN QUANT: CPT

## 2023-06-16 PROCEDURE — 6370000000 HC RX 637 (ALT 250 FOR IP): Performed by: STUDENT IN AN ORGANIZED HEALTH CARE EDUCATION/TRAINING PROGRAM

## 2023-06-16 PROCEDURE — 6370000000 HC RX 637 (ALT 250 FOR IP)

## 2023-06-16 PROCEDURE — 36415 COLL VENOUS BLD VENIPUNCTURE: CPT

## 2023-06-16 PROCEDURE — 2500000003 HC RX 250 WO HCPCS: Performed by: STUDENT IN AN ORGANIZED HEALTH CARE EDUCATION/TRAINING PROGRAM

## 2023-06-16 PROCEDURE — 99222 1ST HOSP IP/OBS MODERATE 55: CPT | Performed by: INTERNAL MEDICINE

## 2023-06-16 PROCEDURE — 6360000004 HC RX CONTRAST MEDICATION: Performed by: RADIOLOGY

## 2023-06-16 PROCEDURE — 2580000003 HC RX 258

## 2023-06-16 PROCEDURE — 94640 AIRWAY INHALATION TREATMENT: CPT

## 2023-06-16 PROCEDURE — 84443 ASSAY THYROID STIM HORMONE: CPT

## 2023-06-16 PROCEDURE — 83880 ASSAY OF NATRIURETIC PEPTIDE: CPT

## 2023-06-16 PROCEDURE — 6360000002 HC RX W HCPCS: Performed by: INTERNAL MEDICINE

## 2023-06-16 PROCEDURE — 2580000003 HC RX 258: Performed by: STUDENT IN AN ORGANIZED HEALTH CARE EDUCATION/TRAINING PROGRAM

## 2023-06-16 PROCEDURE — 2060000000 HC ICU INTERMEDIATE R&B

## 2023-06-16 PROCEDURE — 83605 ASSAY OF LACTIC ACID: CPT

## 2023-06-16 PROCEDURE — 2580000003 HC RX 258: Performed by: EMERGENCY MEDICINE

## 2023-06-16 PROCEDURE — 94660 CPAP INITIATION&MGMT: CPT

## 2023-06-16 PROCEDURE — 2500000003 HC RX 250 WO HCPCS: Performed by: FAMILY MEDICINE

## 2023-06-16 PROCEDURE — 36600 WITHDRAWAL OF ARTERIAL BLOOD: CPT

## 2023-06-16 PROCEDURE — 5A09457 ASSISTANCE WITH RESPIRATORY VENTILATION, 24-96 CONSECUTIVE HOURS, CONTINUOUS POSITIVE AIRWAY PRESSURE: ICD-10-PCS | Performed by: HOSPITALIST

## 2023-06-16 PROCEDURE — 2580000003 HC RX 258: Performed by: FAMILY MEDICINE

## 2023-06-16 PROCEDURE — 2580000003 HC RX 258: Performed by: INTERNAL MEDICINE

## 2023-06-16 PROCEDURE — 84145 PROCALCITONIN (PCT): CPT

## 2023-06-16 PROCEDURE — 6360000002 HC RX W HCPCS: Performed by: STUDENT IN AN ORGANIZED HEALTH CARE EDUCATION/TRAINING PROGRAM

## 2023-06-16 PROCEDURE — 93306 TTE W/DOPPLER COMPLETE: CPT

## 2023-06-16 PROCEDURE — 93005 ELECTROCARDIOGRAM TRACING: CPT | Performed by: FAMILY MEDICINE

## 2023-06-16 PROCEDURE — 2700000000 HC OXYGEN THERAPY PER DAY

## 2023-06-16 PROCEDURE — 6370000000 HC RX 637 (ALT 250 FOR IP): Performed by: INTERNAL MEDICINE

## 2023-06-16 PROCEDURE — 82803 BLOOD GASES ANY COMBINATION: CPT

## 2023-06-16 PROCEDURE — 6360000002 HC RX W HCPCS: Performed by: EMERGENCY MEDICINE

## 2023-06-16 PROCEDURE — 71275 CT ANGIOGRAPHY CHEST: CPT

## 2023-06-16 RX ORDER — IPRATROPIUM BROMIDE AND ALBUTEROL SULFATE 2.5; .5 MG/3ML; MG/3ML
1 SOLUTION RESPIRATORY (INHALATION)
Status: DISCONTINUED | OUTPATIENT
Start: 2023-06-16 | End: 2023-06-16 | Stop reason: ALTCHOICE

## 2023-06-16 RX ORDER — ONDANSETRON 4 MG/1
4 TABLET, ORALLY DISINTEGRATING ORAL EVERY 8 HOURS PRN
Status: DISCONTINUED | OUTPATIENT
Start: 2023-06-16 | End: 2023-06-22 | Stop reason: HOSPADM

## 2023-06-16 RX ORDER — SODIUM CHLORIDE, SODIUM LACTATE, POTASSIUM CHLORIDE, CALCIUM CHLORIDE 600; 310; 30; 20 MG/100ML; MG/100ML; MG/100ML; MG/100ML
INJECTION, SOLUTION INTRAVENOUS CONTINUOUS
Status: DISCONTINUED | OUTPATIENT
Start: 2023-06-16 | End: 2023-06-16

## 2023-06-16 RX ORDER — ONDANSETRON 2 MG/ML
4 INJECTION INTRAMUSCULAR; INTRAVENOUS EVERY 6 HOURS PRN
Status: DISCONTINUED | OUTPATIENT
Start: 2023-06-16 | End: 2023-06-22 | Stop reason: HOSPADM

## 2023-06-16 RX ORDER — 0.9 % SODIUM CHLORIDE 0.9 %
1000 INTRAVENOUS SOLUTION INTRAVENOUS ONCE
Status: COMPLETED | OUTPATIENT
Start: 2023-06-16 | End: 2023-06-16

## 2023-06-16 RX ORDER — HYDRALAZINE HYDROCHLORIDE 20 MG/ML
10 INJECTION INTRAMUSCULAR; INTRAVENOUS EVERY 4 HOURS PRN
Status: DISCONTINUED | OUTPATIENT
Start: 2023-06-16 | End: 2023-06-20

## 2023-06-16 RX ORDER — MIDODRINE HYDROCHLORIDE 5 MG/1
10 TABLET ORAL 3 TIMES DAILY
Status: DISCONTINUED | OUTPATIENT
Start: 2023-06-16 | End: 2023-06-16

## 2023-06-16 RX ORDER — IPRATROPIUM BROMIDE AND ALBUTEROL SULFATE 2.5; .5 MG/3ML; MG/3ML
1 SOLUTION RESPIRATORY (INHALATION) EVERY 4 HOURS PRN
Status: DISCONTINUED | OUTPATIENT
Start: 2023-06-16 | End: 2023-06-22 | Stop reason: HOSPADM

## 2023-06-16 RX ORDER — IPRATROPIUM BROMIDE AND ALBUTEROL SULFATE 2.5; .5 MG/3ML; MG/3ML
1 SOLUTION RESPIRATORY (INHALATION)
Status: DISCONTINUED | OUTPATIENT
Start: 2023-06-16 | End: 2023-06-16

## 2023-06-16 RX ORDER — SODIUM CHLORIDE, SODIUM LACTATE, POTASSIUM CHLORIDE, CALCIUM CHLORIDE 600; 310; 30; 20 MG/100ML; MG/100ML; MG/100ML; MG/100ML
INJECTION, SOLUTION INTRAVENOUS CONTINUOUS
Status: DISPENSED | OUTPATIENT
Start: 2023-06-16 | End: 2023-06-16

## 2023-06-16 RX ORDER — POLYETHYLENE GLYCOL 3350 17 G/17G
17 POWDER, FOR SOLUTION ORAL DAILY PRN
Status: DISCONTINUED | OUTPATIENT
Start: 2023-06-16 | End: 2023-06-22 | Stop reason: HOSPADM

## 2023-06-16 RX ORDER — DILTIAZEM HYDROCHLORIDE 5 MG/ML
5 INJECTION INTRAVENOUS ONCE
Status: COMPLETED | OUTPATIENT
Start: 2023-06-16 | End: 2023-06-16

## 2023-06-16 RX ORDER — ATENOLOL 50 MG/1
50 TABLET ORAL DAILY
Status: DISCONTINUED | OUTPATIENT
Start: 2023-06-16 | End: 2023-06-20

## 2023-06-16 RX ORDER — CYCLOSPORINE 0.5 MG/ML
1 EMULSION OPHTHALMIC 2 TIMES DAILY
Status: DISCONTINUED | OUTPATIENT
Start: 2023-06-16 | End: 2023-06-22 | Stop reason: HOSPADM

## 2023-06-16 RX ORDER — SODIUM CHLORIDE 0.9 % (FLUSH) 0.9 %
5-40 SYRINGE (ML) INJECTION EVERY 12 HOURS SCHEDULED
Status: DISCONTINUED | OUTPATIENT
Start: 2023-06-16 | End: 2023-06-22 | Stop reason: HOSPADM

## 2023-06-16 RX ORDER — SODIUM CHLORIDE 0.9 % (FLUSH) 0.9 %
5-40 SYRINGE (ML) INJECTION PRN
Status: DISCONTINUED | OUTPATIENT
Start: 2023-06-16 | End: 2023-06-22 | Stop reason: HOSPADM

## 2023-06-16 RX ORDER — SODIUM CHLORIDE 9 MG/ML
INJECTION, SOLUTION INTRAVENOUS PRN
Status: DISCONTINUED | OUTPATIENT
Start: 2023-06-16 | End: 2023-06-22 | Stop reason: HOSPADM

## 2023-06-16 RX ORDER — ACETAMINOPHEN 650 MG/1
650 SUPPOSITORY RECTAL EVERY 6 HOURS PRN
Status: DISCONTINUED | OUTPATIENT
Start: 2023-06-16 | End: 2023-06-22 | Stop reason: HOSPADM

## 2023-06-16 RX ORDER — ASPIRIN 325 MG
325 TABLET ORAL DAILY
Status: DISCONTINUED | OUTPATIENT
Start: 2023-06-16 | End: 2023-06-22 | Stop reason: HOSPADM

## 2023-06-16 RX ORDER — IPRATROPIUM BROMIDE AND ALBUTEROL SULFATE 2.5; .5 MG/3ML; MG/3ML
1 SOLUTION RESPIRATORY (INHALATION) EVERY 4 HOURS
Status: DISCONTINUED | OUTPATIENT
Start: 2023-06-16 | End: 2023-06-18

## 2023-06-16 RX ORDER — ATORVASTATIN CALCIUM 40 MG/1
80 TABLET, FILM COATED ORAL NIGHTLY
Status: DISCONTINUED | OUTPATIENT
Start: 2023-06-16 | End: 2023-06-22 | Stop reason: HOSPADM

## 2023-06-16 RX ORDER — SODIUM CHLORIDE 9 MG/ML
INJECTION, SOLUTION INTRAVENOUS CONTINUOUS
Status: DISCONTINUED | OUTPATIENT
Start: 2023-06-16 | End: 2023-06-16

## 2023-06-16 RX ORDER — SODIUM CHLORIDE, SODIUM LACTATE, POTASSIUM CHLORIDE, CALCIUM CHLORIDE 600; 310; 30; 20 MG/100ML; MG/100ML; MG/100ML; MG/100ML
INJECTION, SOLUTION INTRAVENOUS CONTINUOUS
Status: DISPENSED | OUTPATIENT
Start: 2023-06-16 | End: 2023-06-17

## 2023-06-16 RX ORDER — SODIUM CHLORIDE, SODIUM LACTATE, POTASSIUM CHLORIDE, AND CALCIUM CHLORIDE .6; .31; .03; .02 G/100ML; G/100ML; G/100ML; G/100ML
1500 INJECTION, SOLUTION INTRAVENOUS ONCE
Status: COMPLETED | OUTPATIENT
Start: 2023-06-16 | End: 2023-06-16

## 2023-06-16 RX ORDER — ACETAMINOPHEN 325 MG/1
650 TABLET ORAL EVERY 6 HOURS PRN
Status: DISCONTINUED | OUTPATIENT
Start: 2023-06-16 | End: 2023-06-22 | Stop reason: HOSPADM

## 2023-06-16 RX ORDER — ENOXAPARIN SODIUM 100 MG/ML
40 INJECTION SUBCUTANEOUS DAILY
Status: DISCONTINUED | OUTPATIENT
Start: 2023-06-16 | End: 2023-06-22 | Stop reason: HOSPADM

## 2023-06-16 RX ADMIN — ACETAMINOPHEN 650 MG: 325 TABLET ORAL at 07:57

## 2023-06-16 RX ADMIN — SODIUM CHLORIDE 1000 ML: 9 INJECTION, SOLUTION INTRAVENOUS at 05:40

## 2023-06-16 RX ADMIN — AZITHROMYCIN MONOHYDRATE 500 MG: 500 INJECTION, POWDER, LYOPHILIZED, FOR SOLUTION INTRAVENOUS at 02:37

## 2023-06-16 RX ADMIN — IPRATROPIUM BROMIDE AND ALBUTEROL SULFATE 1 DOSE: 2.5; .5 SOLUTION RESPIRATORY (INHALATION) at 08:03

## 2023-06-16 RX ADMIN — WATER 40 MG: 1 INJECTION INTRAMUSCULAR; INTRAVENOUS; SUBCUTANEOUS at 19:03

## 2023-06-16 RX ADMIN — ENOXAPARIN SODIUM 40 MG: 100 INJECTION SUBCUTANEOUS at 11:01

## 2023-06-16 RX ADMIN — METHYLPREDNISOLONE SODIUM SUCCINATE 60 MG: 125 INJECTION, POWDER, FOR SOLUTION INTRAMUSCULAR; INTRAVENOUS at 11:02

## 2023-06-16 RX ADMIN — Medication 10 ML: at 21:23

## 2023-06-16 RX ADMIN — SODIUM CHLORIDE 1000 ML: 9 INJECTION, SOLUTION INTRAVENOUS at 00:55

## 2023-06-16 RX ADMIN — WATER 40 MG: 1 INJECTION INTRAMUSCULAR; INTRAVENOUS; SUBCUTANEOUS at 13:27

## 2023-06-16 RX ADMIN — SODIUM CHLORIDE, POTASSIUM CHLORIDE, SODIUM LACTATE AND CALCIUM CHLORIDE: 600; 310; 30; 20 INJECTION, SOLUTION INTRAVENOUS at 23:38

## 2023-06-16 RX ADMIN — DILTIAZEM HYDROCHLORIDE 5 MG/HR: 5 INJECTION INTRAVENOUS at 09:27

## 2023-06-16 RX ADMIN — MIDODRINE HYDROCHLORIDE 10 MG: 5 TABLET ORAL at 23:38

## 2023-06-16 RX ADMIN — SODIUM CHLORIDE, POTASSIUM CHLORIDE, SODIUM LACTATE AND CALCIUM CHLORIDE 1500 ML: 600; 310; 30; 20 INJECTION, SOLUTION INTRAVENOUS at 11:01

## 2023-06-16 RX ADMIN — CEFTRIAXONE 1000 MG: 1 INJECTION, POWDER, FOR SOLUTION INTRAMUSCULAR; INTRAVENOUS at 02:39

## 2023-06-16 RX ADMIN — IPRATROPIUM BROMIDE AND ALBUTEROL SULFATE 1 DOSE: 2.5; .5 SOLUTION RESPIRATORY (INHALATION) at 06:45

## 2023-06-16 RX ADMIN — SODIUM CHLORIDE 1000 ML: 9 INJECTION, SOLUTION INTRAVENOUS at 02:37

## 2023-06-16 RX ADMIN — Medication 10 ML: at 07:51

## 2023-06-16 RX ADMIN — IOPAMIDOL 80 ML: 755 INJECTION, SOLUTION INTRAVENOUS at 10:26

## 2023-06-16 RX ADMIN — SODIUM CHLORIDE: 9 INJECTION, SOLUTION INTRAVENOUS at 07:56

## 2023-06-16 RX ADMIN — DILTIAZEM HYDROCHLORIDE 2.5 MG/HR: 5 INJECTION INTRAVENOUS at 09:35

## 2023-06-16 RX ADMIN — DOXYCYCLINE 100 MG: 100 INJECTION, POWDER, LYOPHILIZED, FOR SOLUTION INTRAVENOUS at 19:04

## 2023-06-16 RX ADMIN — ATORVASTATIN CALCIUM 80 MG: 40 TABLET, FILM COATED ORAL at 21:23

## 2023-06-16 RX ADMIN — ASPIRIN 325 MG ORAL TABLET 325 MG: 325 PILL ORAL at 10:03

## 2023-06-16 RX ADMIN — DOXYCYCLINE 100 MG: 100 INJECTION, POWDER, LYOPHILIZED, FOR SOLUTION INTRAVENOUS at 06:03

## 2023-06-16 RX ADMIN — SODIUM CHLORIDE, POTASSIUM CHLORIDE, SODIUM LACTATE AND CALCIUM CHLORIDE: 600; 310; 30; 20 INJECTION, SOLUTION INTRAVENOUS at 13:28

## 2023-06-16 RX ADMIN — IPRATROPIUM BROMIDE AND ALBUTEROL SULFATE 1 DOSE: 2.5; .5 SOLUTION RESPIRATORY (INHALATION) at 11:36

## 2023-06-16 RX ADMIN — DILTIAZEM HYDROCHLORIDE 5 MG: 5 INJECTION INTRAVENOUS at 09:27

## 2023-06-16 RX ADMIN — IPRATROPIUM BROMIDE AND ALBUTEROL SULFATE 1 DOSE: 2.5; .5 SOLUTION RESPIRATORY (INHALATION) at 23:22

## 2023-06-16 RX ADMIN — IPRATROPIUM BROMIDE AND ALBUTEROL SULFATE 1 DOSE: 2.5; .5 SOLUTION RESPIRATORY (INHALATION) at 15:59

## 2023-06-16 RX ADMIN — CEFEPIME 2000 MG: 2 INJECTION, POWDER, FOR SOLUTION INTRAVENOUS at 06:07

## 2023-06-16 RX ADMIN — SODIUM CHLORIDE, POTASSIUM CHLORIDE, SODIUM LACTATE AND CALCIUM CHLORIDE: 600; 310; 30; 20 INJECTION, SOLUTION INTRAVENOUS at 12:00

## 2023-06-16 RX ADMIN — IPRATROPIUM BROMIDE AND ALBUTEROL SULFATE 1 DOSE: 2.5; .5 SOLUTION RESPIRATORY (INHALATION) at 19:56

## 2023-06-16 ASSESSMENT — PAIN SCALES - GENERAL
PAINLEVEL_OUTOF10: 0

## 2023-06-17 LAB
ANION GAP SERPL CALC-SCNC: 12 MMOL/L (ref 5–15)
B PERT DNA SPEC QL NAA+PROBE: NOT DETECTED
BACTERIA SPEC CULT: NORMAL
BACTERIA SPEC CULT: NORMAL
BASOPHILS # BLD: 0 K/UL (ref 0–0.1)
BASOPHILS NFR BLD: 0 % (ref 0–1)
BORDETELLA PARAPERTUSSIS BY PCR: NOT DETECTED
BUN SERPL-MCNC: 20 MG/DL (ref 6–20)
BUN/CREAT SERPL: 21 (ref 12–20)
C PNEUM DNA SPEC QL NAA+PROBE: NOT DETECTED
CALCIUM SERPL-MCNC: 8.1 MG/DL (ref 8.5–10.1)
CHLORIDE SERPL-SCNC: 114 MMOL/L (ref 97–108)
CO2 SERPL-SCNC: 18 MMOL/L (ref 21–32)
CREAT SERPL-MCNC: 0.94 MG/DL (ref 0.7–1.3)
DIFFERENTIAL METHOD BLD: ABNORMAL
ECHO AO ROOT DIAM: 3.3 CM
ECHO AO ROOT INDEX: 1.7 CM/M2
ECHO AV AREA PEAK VELOCITY: 4.4 CM2
ECHO AV AREA/BSA PEAK VELOCITY: 2.3 CM2/M2
ECHO AV PEAK GRADIENT: 4 MMHG
ECHO AV PEAK VELOCITY: 1 M/S
ECHO AV VELOCITY RATIO: 1.1
ECHO BSA: 1.95 M2
ECHO EST RA PRESSURE: 10 MMHG
ECHO LA DIAMETER INDEX: 1.96 CM/M2
ECHO LA DIAMETER: 3.8 CM
ECHO LA TO AORTIC ROOT RATIO: 1.15
ECHO LA VOL 2C: 113 ML (ref 18–58)
ECHO LA VOL 2C: 114 ML (ref 18–58)
ECHO LA VOL 4C: 77 ML (ref 18–58)
ECHO LA VOL 4C: 82 ML (ref 18–58)
ECHO LA VOL BP: 94 ML (ref 18–58)
ECHO LA VOL/BSA BIPLANE: 48 ML/M2 (ref 16–34)
ECHO LA VOLUME AREA LENGTH: 98 ML
ECHO LA VOLUME INDEX AREA LENGTH: 51 ML/M2 (ref 16–34)
ECHO LV E' LATERAL VELOCITY: 12 CM/S
ECHO LV E' SEPTAL VELOCITY: 8 CM/S
ECHO LV FRACTIONAL SHORTENING: 38 % (ref 28–44)
ECHO LV INTERNAL DIMENSION DIASTOLE INDEX: 2.32 CM/M2
ECHO LV INTERNAL DIMENSION DIASTOLIC: 4.5 CM (ref 4.2–5.9)
ECHO LV INTERNAL DIMENSION SYSTOLIC INDEX: 1.44 CM/M2
ECHO LV INTERNAL DIMENSION SYSTOLIC: 2.8 CM
ECHO LV IVSD: 1.3 CM (ref 0.6–1)
ECHO LV MASS 2D: 186.4 G (ref 88–224)
ECHO LV MASS INDEX 2D: 96.1 G/M2 (ref 49–115)
ECHO LV POSTERIOR WALL DIASTOLIC: 1 CM (ref 0.6–1)
ECHO LV RELATIVE WALL THICKNESS RATIO: 0.44
ECHO LVOT AREA: 4.2 CM2
ECHO LVOT DIAM: 2.3 CM
ECHO LVOT PEAK GRADIENT: 5 MMHG
ECHO LVOT PEAK VELOCITY: 1.1 M/S
ECHO MV A VELOCITY: 0.53 M/S
ECHO MV AREA PHT: 3.9 CM2
ECHO MV E DECELERATION TIME (DT): 194.6 MS
ECHO MV E VELOCITY: 0.68 M/S
ECHO MV E/A RATIO: 1.28
ECHO MV E/E' LATERAL: 5.67
ECHO MV E/E' RATIO (AVERAGED): 7.08
ECHO MV E/E' SEPTAL: 8.5
ECHO MV PRESSURE HALF TIME (PHT): 56.4 MS
ECHO RIGHT VENTRICULAR SYSTOLIC PRESSURE (RVSP): 27 MMHG
ECHO RV FREE WALL PEAK S': 10 CM/S
ECHO RV TAPSE: 1.4 CM (ref 1.7–?)
ECHO TV REGURGITANT MAX VELOCITY: 2.05 M/S
ECHO TV REGURGITANT PEAK GRADIENT: 17 MMHG
EOSINOPHIL # BLD: 0 K/UL (ref 0–0.4)
EOSINOPHIL NFR BLD: 0 % (ref 0–7)
ERYTHROCYTE [DISTWIDTH] IN BLOOD BY AUTOMATED COUNT: 14.6 % (ref 11.5–14.5)
FLUAV SUBTYP SPEC NAA+PROBE: NOT DETECTED
FLUBV RNA SPEC QL NAA+PROBE: NOT DETECTED
GLUCOSE SERPL-MCNC: 137 MG/DL (ref 65–100)
HADV DNA SPEC QL NAA+PROBE: NOT DETECTED
HCOV 229E RNA SPEC QL NAA+PROBE: NOT DETECTED
HCOV HKU1 RNA SPEC QL NAA+PROBE: NOT DETECTED
HCOV NL63 RNA SPEC QL NAA+PROBE: NOT DETECTED
HCOV OC43 RNA SPEC QL NAA+PROBE: NOT DETECTED
HCT VFR BLD AUTO: 40.1 % (ref 36.6–50.3)
HGB BLD-MCNC: 13.1 G/DL (ref 12.1–17)
HMPV RNA SPEC QL NAA+PROBE: NOT DETECTED
HPIV1 RNA SPEC QL NAA+PROBE: NOT DETECTED
HPIV2 RNA SPEC QL NAA+PROBE: NOT DETECTED
HPIV3 RNA SPEC QL NAA+PROBE: NOT DETECTED
HPIV4 RNA SPEC QL NAA+PROBE: NOT DETECTED
IMM GRANULOCYTES # BLD AUTO: 0 K/UL
IMM GRANULOCYTES NFR BLD AUTO: 0 %
LACTATE SERPL-SCNC: 5.1 MMOL/L (ref 0.4–2)
LYMPHOCYTES # BLD: 0.7 K/UL (ref 0.8–3.5)
LYMPHOCYTES NFR BLD: 5 % (ref 12–49)
M PNEUMO DNA SPEC QL NAA+PROBE: NOT DETECTED
MAGNESIUM SERPL-MCNC: 2.2 MG/DL (ref 1.6–2.4)
MCH RBC QN AUTO: 29.9 PG (ref 26–34)
MCHC RBC AUTO-ENTMCNC: 32.7 G/DL (ref 30–36.5)
MCV RBC AUTO: 91.6 FL (ref 80–99)
METAMYELOCYTES NFR BLD MANUAL: 1 %
MONOCYTES # BLD: 1.1 K/UL (ref 0–1)
MONOCYTES NFR BLD: 8 % (ref 5–13)
NEUTS BAND NFR BLD MANUAL: 6 % (ref 0–6)
NEUTS SEG # BLD: 12 K/UL (ref 1.8–8)
NEUTS SEG NFR BLD: 80 % (ref 32–75)
NRBC # BLD: 0 K/UL (ref 0–0.01)
NRBC BLD-RTO: 0 PER 100 WBC
NT PRO BNP: 2197 PG/ML
PLATELET # BLD AUTO: 98 K/UL (ref 150–400)
PMV BLD AUTO: 11.1 FL (ref 8.9–12.9)
POTASSIUM SERPL-SCNC: 3.2 MMOL/L (ref 3.5–5.1)
RBC # BLD AUTO: 4.38 M/UL (ref 4.1–5.7)
RBC MORPH BLD: ABNORMAL
RSV RNA SPEC QL NAA+PROBE: NOT DETECTED
RV+EV RNA SPEC QL NAA+PROBE: NOT DETECTED
SARS-COV-2 RNA RESP QL NAA+PROBE: NOT DETECTED
SERVICE CMNT-IMP: NORMAL
SODIUM SERPL-SCNC: 144 MMOL/L (ref 136–145)
TROPONIN I SERPL HS-MCNC: 243 NG/L (ref 0–57)
WBC # BLD AUTO: 13.9 K/UL (ref 4.1–11.1)

## 2023-06-17 PROCEDURE — 2580000003 HC RX 258: Performed by: FAMILY MEDICINE

## 2023-06-17 PROCEDURE — 6370000000 HC RX 637 (ALT 250 FOR IP): Performed by: INTERNAL MEDICINE

## 2023-06-17 PROCEDURE — 97161 PT EVAL LOW COMPLEX 20 MIN: CPT

## 2023-06-17 PROCEDURE — 2060000000 HC ICU INTERMEDIATE R&B

## 2023-06-17 PROCEDURE — 2580000003 HC RX 258: Performed by: INTERNAL MEDICINE

## 2023-06-17 PROCEDURE — 84484 ASSAY OF TROPONIN QUANT: CPT

## 2023-06-17 PROCEDURE — 94640 AIRWAY INHALATION TREATMENT: CPT

## 2023-06-17 PROCEDURE — 6360000002 HC RX W HCPCS: Performed by: FAMILY MEDICINE

## 2023-06-17 PROCEDURE — 97530 THERAPEUTIC ACTIVITIES: CPT

## 2023-06-17 PROCEDURE — 6370000000 HC RX 637 (ALT 250 FOR IP)

## 2023-06-17 PROCEDURE — 6360000002 HC RX W HCPCS: Performed by: STUDENT IN AN ORGANIZED HEALTH CARE EDUCATION/TRAINING PROGRAM

## 2023-06-17 PROCEDURE — 2580000003 HC RX 258: Performed by: STUDENT IN AN ORGANIZED HEALTH CARE EDUCATION/TRAINING PROGRAM

## 2023-06-17 PROCEDURE — 6370000000 HC RX 637 (ALT 250 FOR IP): Performed by: FAMILY MEDICINE

## 2023-06-17 PROCEDURE — 0202U NFCT DS 22 TRGT SARS-COV-2: CPT

## 2023-06-17 PROCEDURE — 36415 COLL VENOUS BLD VENIPUNCTURE: CPT

## 2023-06-17 PROCEDURE — 6360000002 HC RX W HCPCS: Performed by: INTERNAL MEDICINE

## 2023-06-17 PROCEDURE — 85025 COMPLETE CBC W/AUTO DIFF WBC: CPT

## 2023-06-17 PROCEDURE — 83605 ASSAY OF LACTIC ACID: CPT

## 2023-06-17 PROCEDURE — 99232 SBSQ HOSP IP/OBS MODERATE 35: CPT | Performed by: INTERNAL MEDICINE

## 2023-06-17 PROCEDURE — 83880 ASSAY OF NATRIURETIC PEPTIDE: CPT

## 2023-06-17 PROCEDURE — 2700000000 HC OXYGEN THERAPY PER DAY

## 2023-06-17 PROCEDURE — 80048 BASIC METABOLIC PNL TOTAL CA: CPT

## 2023-06-17 PROCEDURE — 6370000000 HC RX 637 (ALT 250 FOR IP): Performed by: STUDENT IN AN ORGANIZED HEALTH CARE EDUCATION/TRAINING PROGRAM

## 2023-06-17 PROCEDURE — 2500000003 HC RX 250 WO HCPCS: Performed by: FAMILY MEDICINE

## 2023-06-17 PROCEDURE — 83735 ASSAY OF MAGNESIUM: CPT

## 2023-06-17 RX ORDER — SODIUM BICARBONATE IN D5W 150/1000ML
PLASTIC BAG, INJECTION (ML) INTRAVENOUS CONTINUOUS
Status: DISCONTINUED | OUTPATIENT
Start: 2023-06-17 | End: 2023-06-17 | Stop reason: RX

## 2023-06-17 RX ORDER — LANOLIN ALCOHOL/MO/W.PET/CERES
3 CREAM (GRAM) TOPICAL NIGHTLY PRN
Status: DISCONTINUED | OUTPATIENT
Start: 2023-06-17 | End: 2023-06-22 | Stop reason: HOSPADM

## 2023-06-17 RX ADMIN — Medication 10 ML: at 21:17

## 2023-06-17 RX ADMIN — ATORVASTATIN CALCIUM 80 MG: 40 TABLET, FILM COATED ORAL at 21:17

## 2023-06-17 RX ADMIN — Medication 3 MG: at 22:09

## 2023-06-17 RX ADMIN — Medication 10 ML: at 10:32

## 2023-06-17 RX ADMIN — WATER 1000 MG: 1 INJECTION INTRAMUSCULAR; INTRAVENOUS; SUBCUTANEOUS at 03:07

## 2023-06-17 RX ADMIN — IPRATROPIUM BROMIDE AND ALBUTEROL SULFATE 1 DOSE: 2.5; .5 SOLUTION RESPIRATORY (INHALATION) at 11:43

## 2023-06-17 RX ADMIN — POTASSIUM & SODIUM PHOSPHATES POWDER PACK 280-160-250 MG 250 MG: 280-160-250 PACK at 10:31

## 2023-06-17 RX ADMIN — POTASSIUM & SODIUM PHOSPHATES POWDER PACK 280-160-250 MG 250 MG: 280-160-250 PACK at 13:23

## 2023-06-17 RX ADMIN — WATER 40 MG: 1 INJECTION INTRAMUSCULAR; INTRAVENOUS; SUBCUTANEOUS at 10:31

## 2023-06-17 RX ADMIN — ENOXAPARIN SODIUM 40 MG: 100 INJECTION SUBCUTANEOUS at 10:31

## 2023-06-17 RX ADMIN — SODIUM BICARBONATE: 84 INJECTION, SOLUTION INTRAVENOUS at 10:36

## 2023-06-17 RX ADMIN — WATER 40 MG: 1 INJECTION INTRAMUSCULAR; INTRAVENOUS; SUBCUTANEOUS at 03:08

## 2023-06-17 RX ADMIN — DOXYCYCLINE 100 MG: 100 INJECTION, POWDER, LYOPHILIZED, FOR SOLUTION INTRAVENOUS at 17:30

## 2023-06-17 RX ADMIN — POTASSIUM & SODIUM PHOSPHATES POWDER PACK 280-160-250 MG 250 MG: 280-160-250 PACK at 17:29

## 2023-06-17 RX ADMIN — POTASSIUM & SODIUM PHOSPHATES POWDER PACK 280-160-250 MG 250 MG: 280-160-250 PACK at 21:17

## 2023-06-17 RX ADMIN — IPRATROPIUM BROMIDE AND ALBUTEROL SULFATE 1 DOSE: 2.5; .5 SOLUTION RESPIRATORY (INHALATION) at 08:05

## 2023-06-17 RX ADMIN — ASPIRIN 325 MG ORAL TABLET 325 MG: 325 PILL ORAL at 10:31

## 2023-06-17 RX ADMIN — CYCLOSPORINE 1 DROP: 0.5 EMULSION OPHTHALMIC at 21:18

## 2023-06-17 RX ADMIN — IPRATROPIUM BROMIDE AND ALBUTEROL SULFATE 1 DOSE: 2.5; .5 SOLUTION RESPIRATORY (INHALATION) at 16:16

## 2023-06-17 RX ADMIN — IPRATROPIUM BROMIDE AND ALBUTEROL SULFATE 1 DOSE: 2.5; .5 SOLUTION RESPIRATORY (INHALATION) at 23:50

## 2023-06-17 RX ADMIN — IPRATROPIUM BROMIDE AND ALBUTEROL SULFATE 1 DOSE: 2.5; .5 SOLUTION RESPIRATORY (INHALATION) at 20:47

## 2023-06-17 RX ADMIN — IPRATROPIUM BROMIDE AND ALBUTEROL SULFATE 1 DOSE: 2.5; .5 SOLUTION RESPIRATORY (INHALATION) at 03:08

## 2023-06-17 RX ADMIN — DOXYCYCLINE 100 MG: 100 INJECTION, POWDER, LYOPHILIZED, FOR SOLUTION INTRAVENOUS at 06:22

## 2023-06-18 ENCOUNTER — APPOINTMENT (OUTPATIENT)
Facility: HOSPITAL | Age: 73
DRG: 871 | End: 2023-06-18
Payer: MEDICARE

## 2023-06-18 LAB
ANION GAP SERPL CALC-SCNC: 5 MMOL/L (ref 5–15)
BUN SERPL-MCNC: 22 MG/DL (ref 6–20)
BUN/CREAT SERPL: 27 (ref 12–20)
CALCIUM SERPL-MCNC: 8.1 MG/DL (ref 8.5–10.1)
CHLORIDE SERPL-SCNC: 111 MMOL/L (ref 97–108)
CO2 SERPL-SCNC: 27 MMOL/L (ref 21–32)
CREAT SERPL-MCNC: 0.83 MG/DL (ref 0.7–1.3)
ERYTHROCYTE [DISTWIDTH] IN BLOOD BY AUTOMATED COUNT: 14.5 % (ref 11.5–14.5)
GLUCOSE SERPL-MCNC: 159 MG/DL (ref 65–100)
HCT VFR BLD AUTO: 36.3 % (ref 36.6–50.3)
HGB BLD-MCNC: 12.4 G/DL (ref 12.1–17)
MCH RBC QN AUTO: 29.9 PG (ref 26–34)
MCHC RBC AUTO-ENTMCNC: 34.2 G/DL (ref 30–36.5)
MCV RBC AUTO: 87.5 FL (ref 80–99)
NRBC # BLD: 0 K/UL (ref 0–0.01)
NRBC BLD-RTO: 0 PER 100 WBC
NT PRO BNP: 3994 PG/ML
PLATELET # BLD AUTO: 103 K/UL (ref 150–400)
PMV BLD AUTO: 11.2 FL (ref 8.9–12.9)
POTASSIUM SERPL-SCNC: 3.1 MMOL/L (ref 3.5–5.1)
RBC # BLD AUTO: 4.15 M/UL (ref 4.1–5.7)
SODIUM SERPL-SCNC: 143 MMOL/L (ref 136–145)
WBC # BLD AUTO: 21.5 K/UL (ref 4.1–11.1)

## 2023-06-18 PROCEDURE — 2500000003 HC RX 250 WO HCPCS: Performed by: FAMILY MEDICINE

## 2023-06-18 PROCEDURE — 6360000002 HC RX W HCPCS: Performed by: STUDENT IN AN ORGANIZED HEALTH CARE EDUCATION/TRAINING PROGRAM

## 2023-06-18 PROCEDURE — 6360000002 HC RX W HCPCS: Performed by: FAMILY MEDICINE

## 2023-06-18 PROCEDURE — 6370000000 HC RX 637 (ALT 250 FOR IP): Performed by: INTERNAL MEDICINE

## 2023-06-18 PROCEDURE — 71045 X-RAY EXAM CHEST 1 VIEW: CPT

## 2023-06-18 PROCEDURE — 83880 ASSAY OF NATRIURETIC PEPTIDE: CPT

## 2023-06-18 PROCEDURE — 36415 COLL VENOUS BLD VENIPUNCTURE: CPT

## 2023-06-18 PROCEDURE — 2580000003 HC RX 258: Performed by: FAMILY MEDICINE

## 2023-06-18 PROCEDURE — 2700000000 HC OXYGEN THERAPY PER DAY

## 2023-06-18 PROCEDURE — 6370000000 HC RX 637 (ALT 250 FOR IP): Performed by: FAMILY MEDICINE

## 2023-06-18 PROCEDURE — 85027 COMPLETE CBC AUTOMATED: CPT

## 2023-06-18 PROCEDURE — 2060000000 HC ICU INTERMEDIATE R&B

## 2023-06-18 PROCEDURE — 2580000003 HC RX 258: Performed by: STUDENT IN AN ORGANIZED HEALTH CARE EDUCATION/TRAINING PROGRAM

## 2023-06-18 PROCEDURE — 80048 BASIC METABOLIC PNL TOTAL CA: CPT

## 2023-06-18 PROCEDURE — 6370000000 HC RX 637 (ALT 250 FOR IP): Performed by: STUDENT IN AN ORGANIZED HEALTH CARE EDUCATION/TRAINING PROGRAM

## 2023-06-18 PROCEDURE — 6370000000 HC RX 637 (ALT 250 FOR IP)

## 2023-06-18 RX ORDER — FUROSEMIDE 10 MG/ML
40 INJECTION INTRAMUSCULAR; INTRAVENOUS ONCE
Status: COMPLETED | OUTPATIENT
Start: 2023-06-18 | End: 2023-06-18

## 2023-06-18 RX ORDER — POTASSIUM CHLORIDE 750 MG/1
20 TABLET, FILM COATED, EXTENDED RELEASE ORAL ONCE
Status: COMPLETED | OUTPATIENT
Start: 2023-06-18 | End: 2023-06-18

## 2023-06-18 RX ORDER — IPRATROPIUM BROMIDE AND ALBUTEROL SULFATE 2.5; .5 MG/3ML; MG/3ML
1 SOLUTION RESPIRATORY (INHALATION) 3 TIMES DAILY
Status: DISCONTINUED | OUTPATIENT
Start: 2023-06-18 | End: 2023-06-22

## 2023-06-18 RX ADMIN — ASPIRIN 325 MG ORAL TABLET 325 MG: 325 PILL ORAL at 08:28

## 2023-06-18 RX ADMIN — Medication 10 ML: at 21:06

## 2023-06-18 RX ADMIN — CYCLOSPORINE 1 DROP: 0.5 EMULSION OPHTHALMIC at 13:21

## 2023-06-18 RX ADMIN — IPRATROPIUM BROMIDE AND ALBUTEROL SULFATE 1 DOSE: 2.5; .5 SOLUTION RESPIRATORY (INHALATION) at 06:52

## 2023-06-18 RX ADMIN — IPRATROPIUM BROMIDE AND ALBUTEROL SULFATE 1 DOSE: 2.5; .5 SOLUTION RESPIRATORY (INHALATION) at 21:11

## 2023-06-18 RX ADMIN — IPRATROPIUM BROMIDE AND ALBUTEROL SULFATE 1 DOSE: 2.5; .5 SOLUTION RESPIRATORY (INHALATION) at 11:56

## 2023-06-18 RX ADMIN — ATORVASTATIN CALCIUM 80 MG: 40 TABLET, FILM COATED ORAL at 21:05

## 2023-06-18 RX ADMIN — DOXYCYCLINE 100 MG: 100 INJECTION, POWDER, LYOPHILIZED, FOR SOLUTION INTRAVENOUS at 05:48

## 2023-06-18 RX ADMIN — ENOXAPARIN SODIUM 40 MG: 100 INJECTION SUBCUTANEOUS at 08:28

## 2023-06-18 RX ADMIN — DOXYCYCLINE 100 MG: 100 INJECTION, POWDER, LYOPHILIZED, FOR SOLUTION INTRAVENOUS at 17:24

## 2023-06-18 RX ADMIN — Medication 3 MG: at 21:08

## 2023-06-18 RX ADMIN — WATER 1000 MG: 1 INJECTION INTRAMUSCULAR; INTRAVENOUS; SUBCUTANEOUS at 04:30

## 2023-06-18 RX ADMIN — SODIUM BICARBONATE: 84 INJECTION, SOLUTION INTRAVENOUS at 23:00

## 2023-06-18 RX ADMIN — CYCLOSPORINE 1 DROP: 0.5 EMULSION OPHTHALMIC at 21:06

## 2023-06-18 RX ADMIN — FUROSEMIDE 40 MG: 10 INJECTION, SOLUTION INTRAMUSCULAR; INTRAVENOUS at 15:51

## 2023-06-18 RX ADMIN — POTASSIUM CHLORIDE 20 MEQ: 750 TABLET, FILM COATED, EXTENDED RELEASE ORAL at 15:51

## 2023-06-18 RX ADMIN — SODIUM BICARBONATE: 84 INJECTION, SOLUTION INTRAVENOUS at 00:17

## 2023-06-18 RX ADMIN — Medication 10 ML: at 08:29

## 2023-06-18 ASSESSMENT — PAIN SCALES - GENERAL
PAINLEVEL_OUTOF10: 0
PAINLEVEL_OUTOF10: 0

## 2023-06-19 ENCOUNTER — APPOINTMENT (OUTPATIENT)
Facility: HOSPITAL | Age: 73
DRG: 871 | End: 2023-06-19
Payer: MEDICARE

## 2023-06-19 LAB
ANION GAP SERPL CALC-SCNC: 6 MMOL/L (ref 5–15)
BUN SERPL-MCNC: 28 MG/DL (ref 6–20)
BUN/CREAT SERPL: 32 (ref 12–20)
CALCIUM SERPL-MCNC: 8.2 MG/DL (ref 8.5–10.1)
CHLORIDE SERPL-SCNC: 108 MMOL/L (ref 97–108)
CO2 SERPL-SCNC: 29 MMOL/L (ref 21–32)
CREAT SERPL-MCNC: 0.87 MG/DL (ref 0.7–1.3)
ERYTHROCYTE [DISTWIDTH] IN BLOOD BY AUTOMATED COUNT: 14.7 % (ref 11.5–14.5)
GLUCOSE SERPL-MCNC: 96 MG/DL (ref 65–100)
HCT VFR BLD AUTO: 37.5 % (ref 36.6–50.3)
HGB BLD-MCNC: 12.5 G/DL (ref 12.1–17)
MCH RBC QN AUTO: 29.8 PG (ref 26–34)
MCHC RBC AUTO-ENTMCNC: 33.3 G/DL (ref 30–36.5)
MCV RBC AUTO: 89.5 FL (ref 80–99)
NRBC # BLD: 0 K/UL (ref 0–0.01)
NRBC BLD-RTO: 0 PER 100 WBC
NT PRO BNP: 5324 PG/ML
PLATELET # BLD AUTO: 97 K/UL (ref 150–400)
PMV BLD AUTO: 11 FL (ref 8.9–12.9)
POTASSIUM SERPL-SCNC: 3.2 MMOL/L (ref 3.5–5.1)
RBC # BLD AUTO: 4.19 M/UL (ref 4.1–5.7)
SODIUM SERPL-SCNC: 143 MMOL/L (ref 136–145)
WBC # BLD AUTO: 18.7 K/UL (ref 4.1–11.1)

## 2023-06-19 PROCEDURE — A4216 STERILE WATER/SALINE, 10 ML: HCPCS | Performed by: FAMILY MEDICINE

## 2023-06-19 PROCEDURE — 97165 OT EVAL LOW COMPLEX 30 MIN: CPT

## 2023-06-19 PROCEDURE — 87070 CULTURE OTHR SPECIMN AEROBIC: CPT

## 2023-06-19 PROCEDURE — 36415 COLL VENOUS BLD VENIPUNCTURE: CPT

## 2023-06-19 PROCEDURE — 87205 SMEAR GRAM STAIN: CPT

## 2023-06-19 PROCEDURE — 6370000000 HC RX 637 (ALT 250 FOR IP): Performed by: INTERNAL MEDICINE

## 2023-06-19 PROCEDURE — 97535 SELF CARE MNGMENT TRAINING: CPT

## 2023-06-19 PROCEDURE — 6360000002 HC RX W HCPCS: Performed by: FAMILY MEDICINE

## 2023-06-19 PROCEDURE — 2500000003 HC RX 250 WO HCPCS: Performed by: FAMILY MEDICINE

## 2023-06-19 PROCEDURE — 2060000000 HC ICU INTERMEDIATE R&B

## 2023-06-19 PROCEDURE — 97530 THERAPEUTIC ACTIVITIES: CPT

## 2023-06-19 PROCEDURE — 94640 AIRWAY INHALATION TREATMENT: CPT

## 2023-06-19 PROCEDURE — 6370000000 HC RX 637 (ALT 250 FOR IP): Performed by: PHYSICIAN ASSISTANT

## 2023-06-19 PROCEDURE — 6360000002 HC RX W HCPCS: Performed by: STUDENT IN AN ORGANIZED HEALTH CARE EDUCATION/TRAINING PROGRAM

## 2023-06-19 PROCEDURE — 80048 BASIC METABOLIC PNL TOTAL CA: CPT

## 2023-06-19 PROCEDURE — 6370000000 HC RX 637 (ALT 250 FOR IP): Performed by: STUDENT IN AN ORGANIZED HEALTH CARE EDUCATION/TRAINING PROGRAM

## 2023-06-19 PROCEDURE — 97116 GAIT TRAINING THERAPY: CPT

## 2023-06-19 PROCEDURE — 2700000000 HC OXYGEN THERAPY PER DAY

## 2023-06-19 PROCEDURE — 97110 THERAPEUTIC EXERCISES: CPT

## 2023-06-19 PROCEDURE — 2580000003 HC RX 258: Performed by: FAMILY MEDICINE

## 2023-06-19 PROCEDURE — 85027 COMPLETE CBC AUTOMATED: CPT

## 2023-06-19 PROCEDURE — 71045 X-RAY EXAM CHEST 1 VIEW: CPT

## 2023-06-19 PROCEDURE — 6370000000 HC RX 637 (ALT 250 FOR IP): Performed by: FAMILY MEDICINE

## 2023-06-19 PROCEDURE — C9113 INJ PANTOPRAZOLE SODIUM, VIA: HCPCS | Performed by: FAMILY MEDICINE

## 2023-06-19 PROCEDURE — 83880 ASSAY OF NATRIURETIC PEPTIDE: CPT

## 2023-06-19 PROCEDURE — 6370000000 HC RX 637 (ALT 250 FOR IP)

## 2023-06-19 RX ORDER — BENZONATATE 100 MG/1
100 CAPSULE ORAL 3 TIMES DAILY
Status: DISCONTINUED | OUTPATIENT
Start: 2023-06-19 | End: 2023-06-22 | Stop reason: HOSPADM

## 2023-06-19 RX ORDER — GUAIFENESIN/DEXTROMETHORPHAN 100-10MG/5
10 SYRUP ORAL EVERY 6 HOURS PRN
Status: DISCONTINUED | OUTPATIENT
Start: 2023-06-19 | End: 2023-06-22 | Stop reason: HOSPADM

## 2023-06-19 RX ORDER — POTASSIUM CHLORIDE 750 MG/1
40 TABLET, FILM COATED, EXTENDED RELEASE ORAL ONCE
Status: COMPLETED | OUTPATIENT
Start: 2023-06-19 | End: 2023-06-19

## 2023-06-19 RX ORDER — BENZONATATE 100 MG/1
100 CAPSULE ORAL 3 TIMES DAILY PRN
Status: DISCONTINUED | OUTPATIENT
Start: 2023-06-19 | End: 2023-06-19

## 2023-06-19 RX ORDER — FUROSEMIDE 10 MG/ML
40 INJECTION INTRAMUSCULAR; INTRAVENOUS DAILY
Status: DISCONTINUED | OUTPATIENT
Start: 2023-06-19 | End: 2023-06-22 | Stop reason: HOSPADM

## 2023-06-19 RX ORDER — GUAIFENESIN 600 MG/1
1200 TABLET, EXTENDED RELEASE ORAL 2 TIMES DAILY
Status: DISCONTINUED | OUTPATIENT
Start: 2023-06-19 | End: 2023-06-22 | Stop reason: HOSPADM

## 2023-06-19 RX ORDER — CALCIUM CARBONATE 500 MG/1
500 TABLET, CHEWABLE ORAL 3 TIMES DAILY
Status: DISCONTINUED | OUTPATIENT
Start: 2023-06-19 | End: 2023-06-22 | Stop reason: HOSPADM

## 2023-06-19 RX ADMIN — CYCLOSPORINE 1 DROP: 0.5 EMULSION OPHTHALMIC at 09:56

## 2023-06-19 RX ADMIN — ENOXAPARIN SODIUM 40 MG: 100 INJECTION SUBCUTANEOUS at 09:55

## 2023-06-19 RX ADMIN — POTASSIUM CHLORIDE 40 MEQ: 750 TABLET, FILM COATED, EXTENDED RELEASE ORAL at 06:40

## 2023-06-19 RX ADMIN — CALCIUM CARBONATE (ANTACID) CHEW TAB 500 MG 500 MG: 500 CHEW TAB at 14:14

## 2023-06-19 RX ADMIN — BENZONATATE 100 MG: 100 CAPSULE ORAL at 21:01

## 2023-06-19 RX ADMIN — GUAIFENESIN 1200 MG: 600 TABLET, EXTENDED RELEASE ORAL at 21:01

## 2023-06-19 RX ADMIN — WATER 1000 MG: 1 INJECTION INTRAMUSCULAR; INTRAVENOUS; SUBCUTANEOUS at 04:01

## 2023-06-19 RX ADMIN — ACETAMINOPHEN 650 MG: 325 TABLET ORAL at 23:34

## 2023-06-19 RX ADMIN — SODIUM CHLORIDE 40 MG: 9 INJECTION INTRAMUSCULAR; INTRAVENOUS; SUBCUTANEOUS at 09:54

## 2023-06-19 RX ADMIN — ATORVASTATIN CALCIUM 80 MG: 40 TABLET, FILM COATED ORAL at 21:01

## 2023-06-19 RX ADMIN — DOXYCYCLINE 100 MG: 100 INJECTION, POWDER, LYOPHILIZED, FOR SOLUTION INTRAVENOUS at 17:43

## 2023-06-19 RX ADMIN — ASPIRIN 325 MG ORAL TABLET 325 MG: 325 PILL ORAL at 09:54

## 2023-06-19 RX ADMIN — DOXYCYCLINE 100 MG: 100 INJECTION, POWDER, LYOPHILIZED, FOR SOLUTION INTRAVENOUS at 06:00

## 2023-06-19 RX ADMIN — IPRATROPIUM BROMIDE AND ALBUTEROL SULFATE 1 DOSE: 2.5; .5 SOLUTION RESPIRATORY (INHALATION) at 19:54

## 2023-06-19 RX ADMIN — FUROSEMIDE 40 MG: 10 INJECTION, SOLUTION INTRAMUSCULAR; INTRAVENOUS at 09:54

## 2023-06-19 RX ADMIN — BENZONATATE 100 MG: 100 CAPSULE ORAL at 14:14

## 2023-06-19 RX ADMIN — IPRATROPIUM BROMIDE AND ALBUTEROL SULFATE 1 DOSE: 2.5; .5 SOLUTION RESPIRATORY (INHALATION) at 13:47

## 2023-06-19 RX ADMIN — GUAIFENESIN 1200 MG: 600 TABLET, EXTENDED RELEASE ORAL at 09:55

## 2023-06-19 RX ADMIN — SODIUM CHLORIDE 40 MG: 9 INJECTION INTRAMUSCULAR; INTRAVENOUS; SUBCUTANEOUS at 21:02

## 2023-06-19 RX ADMIN — CYCLOSPORINE 1 DROP: 0.5 EMULSION OPHTHALMIC at 21:03

## 2023-06-19 RX ADMIN — IPRATROPIUM BROMIDE AND ALBUTEROL SULFATE 1 DOSE: 2.5; .5 SOLUTION RESPIRATORY (INHALATION) at 07:58

## 2023-06-19 RX ADMIN — CALCIUM CARBONATE (ANTACID) CHEW TAB 500 MG 500 MG: 500 CHEW TAB at 09:54

## 2023-06-19 RX ADMIN — CALCIUM CARBONATE (ANTACID) CHEW TAB 500 MG 500 MG: 500 CHEW TAB at 21:01

## 2023-06-20 ENCOUNTER — APPOINTMENT (OUTPATIENT)
Facility: HOSPITAL | Age: 73
DRG: 871 | End: 2023-06-20
Payer: MEDICARE

## 2023-06-20 LAB
ANION GAP SERPL CALC-SCNC: 5 MMOL/L (ref 5–15)
BACTERIA SPEC CULT: NORMAL
BACTERIA SPEC CULT: NORMAL
BUN SERPL-MCNC: 25 MG/DL (ref 6–20)
BUN/CREAT SERPL: 33 (ref 12–20)
CALCIUM SERPL-MCNC: 7.8 MG/DL (ref 8.5–10.1)
CHLORIDE SERPL-SCNC: 106 MMOL/L (ref 97–108)
CO2 SERPL-SCNC: 28 MMOL/L (ref 21–32)
CREAT SERPL-MCNC: 0.76 MG/DL (ref 0.7–1.3)
ERYTHROCYTE [DISTWIDTH] IN BLOOD BY AUTOMATED COUNT: 14.6 % (ref 11.5–14.5)
GLUCOSE SERPL-MCNC: 98 MG/DL (ref 65–100)
HCT VFR BLD AUTO: 38 % (ref 36.6–50.3)
HGB BLD-MCNC: 12.4 G/DL (ref 12.1–17)
MCH RBC QN AUTO: 29.8 PG (ref 26–34)
MCHC RBC AUTO-ENTMCNC: 32.6 G/DL (ref 30–36.5)
MCV RBC AUTO: 91.3 FL (ref 80–99)
NRBC # BLD: 0 K/UL (ref 0–0.01)
NRBC BLD-RTO: 0 PER 100 WBC
NT PRO BNP: 3929 PG/ML
PLATELET # BLD AUTO: 117 K/UL (ref 150–400)
PMV BLD AUTO: 11 FL (ref 8.9–12.9)
POTASSIUM SERPL-SCNC: 3.2 MMOL/L (ref 3.5–5.1)
RBC # BLD AUTO: 4.16 M/UL (ref 4.1–5.7)
SERVICE CMNT-IMP: NORMAL
SERVICE CMNT-IMP: NORMAL
SODIUM SERPL-SCNC: 139 MMOL/L (ref 136–145)
WBC # BLD AUTO: 14.6 K/UL (ref 4.1–11.1)

## 2023-06-20 PROCEDURE — 2580000003 HC RX 258: Performed by: FAMILY MEDICINE

## 2023-06-20 PROCEDURE — 80048 BASIC METABOLIC PNL TOTAL CA: CPT

## 2023-06-20 PROCEDURE — 2500000003 HC RX 250 WO HCPCS: Performed by: FAMILY MEDICINE

## 2023-06-20 PROCEDURE — 6370000000 HC RX 637 (ALT 250 FOR IP): Performed by: STUDENT IN AN ORGANIZED HEALTH CARE EDUCATION/TRAINING PROGRAM

## 2023-06-20 PROCEDURE — 6370000000 HC RX 637 (ALT 250 FOR IP): Performed by: PHYSICIAN ASSISTANT

## 2023-06-20 PROCEDURE — 94640 AIRWAY INHALATION TREATMENT: CPT

## 2023-06-20 PROCEDURE — 6360000002 HC RX W HCPCS: Performed by: STUDENT IN AN ORGANIZED HEALTH CARE EDUCATION/TRAINING PROGRAM

## 2023-06-20 PROCEDURE — 6360000002 HC RX W HCPCS: Performed by: FAMILY MEDICINE

## 2023-06-20 PROCEDURE — 94760 N-INVAS EAR/PLS OXIMETRY 1: CPT

## 2023-06-20 PROCEDURE — 6370000000 HC RX 637 (ALT 250 FOR IP): Performed by: INTERNAL MEDICINE

## 2023-06-20 PROCEDURE — 2580000003 HC RX 258: Performed by: STUDENT IN AN ORGANIZED HEALTH CARE EDUCATION/TRAINING PROGRAM

## 2023-06-20 PROCEDURE — 83880 ASSAY OF NATRIURETIC PEPTIDE: CPT

## 2023-06-20 PROCEDURE — 71045 X-RAY EXAM CHEST 1 VIEW: CPT

## 2023-06-20 PROCEDURE — 6370000000 HC RX 637 (ALT 250 FOR IP): Performed by: FAMILY MEDICINE

## 2023-06-20 PROCEDURE — 2060000000 HC ICU INTERMEDIATE R&B

## 2023-06-20 PROCEDURE — 36415 COLL VENOUS BLD VENIPUNCTURE: CPT

## 2023-06-20 PROCEDURE — 85027 COMPLETE CBC AUTOMATED: CPT

## 2023-06-20 PROCEDURE — C9113 INJ PANTOPRAZOLE SODIUM, VIA: HCPCS | Performed by: FAMILY MEDICINE

## 2023-06-20 PROCEDURE — A4216 STERILE WATER/SALINE, 10 ML: HCPCS | Performed by: FAMILY MEDICINE

## 2023-06-20 RX ORDER — DOXYCYCLINE HYCLATE 100 MG
100 TABLET ORAL EVERY 12 HOURS SCHEDULED
Status: DISCONTINUED | OUTPATIENT
Start: 2023-06-20 | End: 2023-06-20

## 2023-06-20 RX ORDER — DOXYCYCLINE HYCLATE 100 MG
100 TABLET ORAL EVERY 12 HOURS SCHEDULED
Status: DISCONTINUED | OUTPATIENT
Start: 2023-06-20 | End: 2023-06-22 | Stop reason: HOSPADM

## 2023-06-20 RX ORDER — ATENOLOL 50 MG/1
50 TABLET ORAL DAILY
Status: DISCONTINUED | OUTPATIENT
Start: 2023-06-20 | End: 2023-06-22 | Stop reason: HOSPADM

## 2023-06-20 RX ORDER — POTASSIUM CHLORIDE 750 MG/1
20 TABLET, FILM COATED, EXTENDED RELEASE ORAL DAILY
Status: DISCONTINUED | OUTPATIENT
Start: 2023-06-20 | End: 2023-06-22 | Stop reason: HOSPADM

## 2023-06-20 RX ADMIN — BENZONATATE 100 MG: 100 CAPSULE ORAL at 22:30

## 2023-06-20 RX ADMIN — ATENOLOL 50 MG: 50 TABLET ORAL at 10:37

## 2023-06-20 RX ADMIN — Medication 10 ML: at 09:04

## 2023-06-20 RX ADMIN — SODIUM CHLORIDE 40 MG: 9 INJECTION INTRAMUSCULAR; INTRAVENOUS; SUBCUTANEOUS at 09:03

## 2023-06-20 RX ADMIN — GUAIFENESIN 1200 MG: 600 TABLET, EXTENDED RELEASE ORAL at 22:31

## 2023-06-20 RX ADMIN — CALCIUM CARBONATE (ANTACID) CHEW TAB 500 MG 500 MG: 500 CHEW TAB at 09:03

## 2023-06-20 RX ADMIN — ENOXAPARIN SODIUM 40 MG: 100 INJECTION SUBCUTANEOUS at 09:03

## 2023-06-20 RX ADMIN — IPRATROPIUM BROMIDE AND ALBUTEROL SULFATE 1 DOSE: 2.5; .5 SOLUTION RESPIRATORY (INHALATION) at 13:09

## 2023-06-20 RX ADMIN — GUAIFENESIN 1200 MG: 600 TABLET, EXTENDED RELEASE ORAL at 09:03

## 2023-06-20 RX ADMIN — ATORVASTATIN CALCIUM 80 MG: 40 TABLET, FILM COATED ORAL at 22:31

## 2023-06-20 RX ADMIN — FUROSEMIDE 40 MG: 10 INJECTION, SOLUTION INTRAMUSCULAR; INTRAVENOUS at 09:03

## 2023-06-20 RX ADMIN — BENZONATATE 100 MG: 100 CAPSULE ORAL at 09:03

## 2023-06-20 RX ADMIN — SODIUM CHLORIDE 40 MG: 9 INJECTION INTRAMUSCULAR; INTRAVENOUS; SUBCUTANEOUS at 22:31

## 2023-06-20 RX ADMIN — DOXYCYCLINE 100 MG: 100 INJECTION, POWDER, LYOPHILIZED, FOR SOLUTION INTRAVENOUS at 05:29

## 2023-06-20 RX ADMIN — BENZONATATE 100 MG: 100 CAPSULE ORAL at 15:21

## 2023-06-20 RX ADMIN — CALCIUM CARBONATE (ANTACID) CHEW TAB 500 MG 500 MG: 500 CHEW TAB at 15:21

## 2023-06-20 RX ADMIN — CALCIUM CARBONATE (ANTACID) CHEW TAB 500 MG 500 MG: 500 CHEW TAB at 22:30

## 2023-06-20 RX ADMIN — DOXYCYCLINE HYCLATE 100 MG: 100 TABLET, COATED ORAL at 22:30

## 2023-06-20 RX ADMIN — WATER 1000 MG: 1 INJECTION INTRAMUSCULAR; INTRAVENOUS; SUBCUTANEOUS at 05:28

## 2023-06-20 RX ADMIN — CYCLOSPORINE 1 DROP: 0.5 EMULSION OPHTHALMIC at 22:31

## 2023-06-20 RX ADMIN — IPRATROPIUM BROMIDE AND ALBUTEROL SULFATE 1 DOSE: 2.5; .5 SOLUTION RESPIRATORY (INHALATION) at 22:52

## 2023-06-20 RX ADMIN — CYCLOSPORINE 1 DROP: 0.5 EMULSION OPHTHALMIC at 09:04

## 2023-06-20 RX ADMIN — IPRATROPIUM BROMIDE AND ALBUTEROL SULFATE 1 DOSE: 2.5; .5 SOLUTION RESPIRATORY (INHALATION) at 07:36

## 2023-06-20 RX ADMIN — ASPIRIN 325 MG ORAL TABLET 325 MG: 325 PILL ORAL at 09:03

## 2023-06-20 RX ADMIN — POTASSIUM CHLORIDE 20 MEQ: 750 TABLET, FILM COATED, EXTENDED RELEASE ORAL at 09:03

## 2023-06-20 ASSESSMENT — PAIN SCALES - GENERAL
PAINLEVEL_OUTOF10: 0

## 2023-06-21 ENCOUNTER — APPOINTMENT (OUTPATIENT)
Facility: HOSPITAL | Age: 73
DRG: 871 | End: 2023-06-21
Payer: MEDICARE

## 2023-06-21 PROBLEM — I47.9 PAROXYSMAL TACHYCARDIA (HCC): Status: ACTIVE | Noted: 2023-06-21

## 2023-06-21 LAB
ANION GAP SERPL CALC-SCNC: 8 MMOL/L (ref 5–15)
BACTERIA SPEC CULT: NORMAL
BUN SERPL-MCNC: 26 MG/DL (ref 6–20)
BUN/CREAT SERPL: 36 (ref 12–20)
CALCIUM SERPL-MCNC: 8.2 MG/DL (ref 8.5–10.1)
CHLORIDE SERPL-SCNC: 106 MMOL/L (ref 97–108)
CO2 SERPL-SCNC: 25 MMOL/L (ref 21–32)
CREAT SERPL-MCNC: 0.72 MG/DL (ref 0.7–1.3)
ERYTHROCYTE [DISTWIDTH] IN BLOOD BY AUTOMATED COUNT: 14.2 % (ref 11.5–14.5)
GLUCOSE SERPL-MCNC: 94 MG/DL (ref 65–100)
GRAM STN SPEC: NORMAL
HCT VFR BLD AUTO: 38.4 % (ref 36.6–50.3)
HGB BLD-MCNC: 12.6 G/DL (ref 12.1–17)
MCH RBC QN AUTO: 29.8 PG (ref 26–34)
MCHC RBC AUTO-ENTMCNC: 32.8 G/DL (ref 30–36.5)
MCV RBC AUTO: 90.8 FL (ref 80–99)
NRBC # BLD: 0 K/UL (ref 0–0.01)
NRBC BLD-RTO: 0 PER 100 WBC
NT PRO BNP: 1248 PG/ML
PLATELET # BLD AUTO: 176 K/UL (ref 150–400)
PMV BLD AUTO: 10.3 FL (ref 8.9–12.9)
POTASSIUM SERPL-SCNC: 3.7 MMOL/L (ref 3.5–5.1)
RBC # BLD AUTO: 4.23 M/UL (ref 4.1–5.7)
SERVICE CMNT-IMP: NORMAL
SODIUM SERPL-SCNC: 139 MMOL/L (ref 136–145)
WBC # BLD AUTO: 14.3 K/UL (ref 4.1–11.1)

## 2023-06-21 PROCEDURE — 2580000003 HC RX 258: Performed by: STUDENT IN AN ORGANIZED HEALTH CARE EDUCATION/TRAINING PROGRAM

## 2023-06-21 PROCEDURE — 6370000000 HC RX 637 (ALT 250 FOR IP): Performed by: PHYSICIAN ASSISTANT

## 2023-06-21 PROCEDURE — 94640 AIRWAY INHALATION TREATMENT: CPT

## 2023-06-21 PROCEDURE — 80048 BASIC METABOLIC PNL TOTAL CA: CPT

## 2023-06-21 PROCEDURE — 6370000000 HC RX 637 (ALT 250 FOR IP): Performed by: FAMILY MEDICINE

## 2023-06-21 PROCEDURE — C9113 INJ PANTOPRAZOLE SODIUM, VIA: HCPCS | Performed by: FAMILY MEDICINE

## 2023-06-21 PROCEDURE — 85027 COMPLETE CBC AUTOMATED: CPT

## 2023-06-21 PROCEDURE — 97530 THERAPEUTIC ACTIVITIES: CPT

## 2023-06-21 PROCEDURE — 6370000000 HC RX 637 (ALT 250 FOR IP): Performed by: STUDENT IN AN ORGANIZED HEALTH CARE EDUCATION/TRAINING PROGRAM

## 2023-06-21 PROCEDURE — 99233 SBSQ HOSP IP/OBS HIGH 50: CPT | Performed by: SPECIALIST

## 2023-06-21 PROCEDURE — 71045 X-RAY EXAM CHEST 1 VIEW: CPT

## 2023-06-21 PROCEDURE — 97116 GAIT TRAINING THERAPY: CPT

## 2023-06-21 PROCEDURE — 83880 ASSAY OF NATRIURETIC PEPTIDE: CPT

## 2023-06-21 PROCEDURE — 6370000000 HC RX 637 (ALT 250 FOR IP): Performed by: INTERNAL MEDICINE

## 2023-06-21 PROCEDURE — 6360000002 HC RX W HCPCS: Performed by: STUDENT IN AN ORGANIZED HEALTH CARE EDUCATION/TRAINING PROGRAM

## 2023-06-21 PROCEDURE — 36415 COLL VENOUS BLD VENIPUNCTURE: CPT

## 2023-06-21 PROCEDURE — A4216 STERILE WATER/SALINE, 10 ML: HCPCS | Performed by: FAMILY MEDICINE

## 2023-06-21 PROCEDURE — 71250 CT THORAX DX C-: CPT

## 2023-06-21 PROCEDURE — 2060000000 HC ICU INTERMEDIATE R&B

## 2023-06-21 PROCEDURE — 2580000003 HC RX 258: Performed by: FAMILY MEDICINE

## 2023-06-21 PROCEDURE — 97535 SELF CARE MNGMENT TRAINING: CPT

## 2023-06-21 PROCEDURE — 2700000000 HC OXYGEN THERAPY PER DAY

## 2023-06-21 PROCEDURE — 6360000002 HC RX W HCPCS: Performed by: FAMILY MEDICINE

## 2023-06-21 RX ADMIN — CALCIUM CARBONATE (ANTACID) CHEW TAB 500 MG 500 MG: 500 CHEW TAB at 20:56

## 2023-06-21 RX ADMIN — SODIUM CHLORIDE 40 MG: 9 INJECTION INTRAMUSCULAR; INTRAVENOUS; SUBCUTANEOUS at 09:07

## 2023-06-21 RX ADMIN — Medication 10 ML: at 20:56

## 2023-06-21 RX ADMIN — BENZONATATE 100 MG: 100 CAPSULE ORAL at 20:56

## 2023-06-21 RX ADMIN — BENZONATATE 100 MG: 100 CAPSULE ORAL at 09:07

## 2023-06-21 RX ADMIN — Medication 10 ML: at 09:08

## 2023-06-21 RX ADMIN — BENZONATATE 100 MG: 100 CAPSULE ORAL at 15:02

## 2023-06-21 RX ADMIN — IPRATROPIUM BROMIDE AND ALBUTEROL SULFATE 1 DOSE: 2.5; .5 SOLUTION RESPIRATORY (INHALATION) at 20:36

## 2023-06-21 RX ADMIN — FUROSEMIDE 40 MG: 10 INJECTION, SOLUTION INTRAMUSCULAR; INTRAVENOUS at 09:07

## 2023-06-21 RX ADMIN — DOXYCYCLINE HYCLATE 100 MG: 100 TABLET, COATED ORAL at 09:08

## 2023-06-21 RX ADMIN — GUAIFENESIN 1200 MG: 600 TABLET, EXTENDED RELEASE ORAL at 09:08

## 2023-06-21 RX ADMIN — CYCLOSPORINE 1 DROP: 0.5 EMULSION OPHTHALMIC at 20:55

## 2023-06-21 RX ADMIN — POTASSIUM CHLORIDE 20 MEQ: 750 TABLET, FILM COATED, EXTENDED RELEASE ORAL at 09:08

## 2023-06-21 RX ADMIN — ATORVASTATIN CALCIUM 80 MG: 40 TABLET, FILM COATED ORAL at 20:56

## 2023-06-21 RX ADMIN — SODIUM CHLORIDE 40 MG: 9 INJECTION INTRAMUSCULAR; INTRAVENOUS; SUBCUTANEOUS at 20:56

## 2023-06-21 RX ADMIN — ASPIRIN 325 MG ORAL TABLET 325 MG: 325 PILL ORAL at 09:07

## 2023-06-21 RX ADMIN — IPRATROPIUM BROMIDE AND ALBUTEROL SULFATE 1 DOSE: 2.5; .5 SOLUTION RESPIRATORY (INHALATION) at 13:36

## 2023-06-21 RX ADMIN — CYCLOSPORINE 1 DROP: 0.5 EMULSION OPHTHALMIC at 09:08

## 2023-06-21 RX ADMIN — IPRATROPIUM BROMIDE AND ALBUTEROL SULFATE 1 DOSE: 2.5; .5 SOLUTION RESPIRATORY (INHALATION) at 07:25

## 2023-06-21 RX ADMIN — ENOXAPARIN SODIUM 40 MG: 100 INJECTION SUBCUTANEOUS at 09:07

## 2023-06-21 RX ADMIN — ATENOLOL 50 MG: 50 TABLET ORAL at 09:08

## 2023-06-21 RX ADMIN — CALCIUM CARBONATE (ANTACID) CHEW TAB 500 MG 500 MG: 500 CHEW TAB at 15:02

## 2023-06-21 RX ADMIN — CALCIUM CARBONATE (ANTACID) CHEW TAB 500 MG 500 MG: 500 CHEW TAB at 09:08

## 2023-06-21 RX ADMIN — GUAIFENESIN 1200 MG: 600 TABLET, EXTENDED RELEASE ORAL at 20:56

## 2023-06-21 RX ADMIN — WATER 1000 MG: 1 INJECTION INTRAMUSCULAR; INTRAVENOUS; SUBCUTANEOUS at 05:06

## 2023-06-21 RX ADMIN — DOXYCYCLINE HYCLATE 100 MG: 100 TABLET, COATED ORAL at 20:56

## 2023-06-21 ASSESSMENT — PAIN SCALES - GENERAL
PAINLEVEL_OUTOF10: 0
PAINLEVEL_OUTOF10: 0

## 2023-06-21 NOTE — CARE COORDINATION
Care Management Initial Assessment       RUR: 10%  Readmission? No  1st IM letter given? Yes -   1st  letter given: No, n/a    CM spoke to attending. PEYMAN a few more days. Barriers are fever and hypoxia. CM introduced self and role and assessed JOURDAN and DC needs and plan with pt. CM will follow for recommendation. Pt independent with no O2 at baseline. Disposition is return home in personal vehicle - wife will transport. No DME or hx of HH or IPR. 2nd IM will be provided. 06/21/23 0961   Service Assessment   Patient Orientation Alert and Oriented   Cognition Alert   History Provided By Patient   Primary Caregiver Self   Accompanied By/Relationship none   Support Systems Spouse/Significant Other   Patient's Healthcare Decision Maker is: Legal Next of Kin  (Pt's wife Shane Aparicio [phone])   PCP Verified by CM Yes   Last Visit to PCP Within last 6 months   Prior Functional Level Independent in ADLs/IADLs   Current Functional Level Assistance with the following:;Bathing;Dressing;Cooking;Housework; Shopping   Can patient return to prior living arrangement Yes   Ability to make needs known: Good   Family able to assist with home care needs: Yes   Would you like for me to discuss the discharge plan with any other family members/significant others, and if so, who? No   Financial Resources Medicare   Community Resources None   Social/Functional History   Lives With Spouse   Type of [address] Multi-level  (First floor primary bedroom pt uses)   Home Access Level entry   Bathroom Shower/Tub Walk-in shower   Bathroom Toilet Standard   Bathroom Accessibility Accessible   ADL Assistance Independent   Active  Yes   Mode of Transportation Car   Discharge Planning   Type of [address] Prior To Admission None   Potential [address] [place] [address] [place]  (PT/OT possibly)   DME Ordered?  No   Potential Assistance Purchasing

## 2023-06-22 ENCOUNTER — APPOINTMENT (OUTPATIENT)
Facility: HOSPITAL | Age: 73
DRG: 871 | End: 2023-06-22
Payer: MEDICARE

## 2023-06-22 VITALS
SYSTOLIC BLOOD PRESSURE: 106 MMHG | DIASTOLIC BLOOD PRESSURE: 77 MMHG | WEIGHT: 177 LBS | TEMPERATURE: 98.2 F | OXYGEN SATURATION: 96 % | BODY MASS INDEX: 26.22 KG/M2 | HEIGHT: 69 IN | RESPIRATION RATE: 18 BRPM | HEART RATE: 75 BPM

## 2023-06-22 PROBLEM — J16.0 CAP (COMMUNITY ACQUIRED PNEUMONIA) DUE TO CHLAMYDIA SPECIES: Status: RESOLVED | Noted: 2023-06-16 | Resolved: 2023-06-22

## 2023-06-22 PROBLEM — I47.9 PAROXYSMAL TACHYCARDIA (HCC): Status: RESOLVED | Noted: 2023-06-21 | Resolved: 2023-06-22

## 2023-06-22 LAB
ANION GAP SERPL CALC-SCNC: 6 MMOL/L (ref 5–15)
BUN SERPL-MCNC: 24 MG/DL (ref 6–20)
BUN/CREAT SERPL: 32 (ref 12–20)
CALCIUM SERPL-MCNC: 8.6 MG/DL (ref 8.5–10.1)
CHLORIDE SERPL-SCNC: 105 MMOL/L (ref 97–108)
CO2 SERPL-SCNC: 27 MMOL/L (ref 21–32)
CREAT SERPL-MCNC: 0.76 MG/DL (ref 0.7–1.3)
ECHO BSA: 1.95 M2
ERYTHROCYTE [DISTWIDTH] IN BLOOD BY AUTOMATED COUNT: 14 % (ref 11.5–14.5)
GLUCOSE SERPL-MCNC: 95 MG/DL (ref 65–100)
HCT VFR BLD AUTO: 38.3 % (ref 36.6–50.3)
HGB BLD-MCNC: 12.6 G/DL (ref 12.1–17)
MCH RBC QN AUTO: 29.9 PG (ref 26–34)
MCHC RBC AUTO-ENTMCNC: 32.9 G/DL (ref 30–36.5)
MCV RBC AUTO: 91 FL (ref 80–99)
NRBC # BLD: 0 K/UL (ref 0–0.01)
NRBC BLD-RTO: 0 PER 100 WBC
NT PRO BNP: 426 PG/ML
PLATELET # BLD AUTO: 245 K/UL (ref 150–400)
PMV BLD AUTO: 10 FL (ref 8.9–12.9)
POTASSIUM SERPL-SCNC: 3.6 MMOL/L (ref 3.5–5.1)
RBC # BLD AUTO: 4.21 M/UL (ref 4.1–5.7)
SODIUM SERPL-SCNC: 138 MMOL/L (ref 136–145)
WBC # BLD AUTO: 12.1 K/UL (ref 4.1–11.1)

## 2023-06-22 PROCEDURE — 6370000000 HC RX 637 (ALT 250 FOR IP): Performed by: PHYSICIAN ASSISTANT

## 2023-06-22 PROCEDURE — 85027 COMPLETE CBC AUTOMATED: CPT

## 2023-06-22 PROCEDURE — 6360000002 HC RX W HCPCS: Performed by: FAMILY MEDICINE

## 2023-06-22 PROCEDURE — 2580000003 HC RX 258: Performed by: STUDENT IN AN ORGANIZED HEALTH CARE EDUCATION/TRAINING PROGRAM

## 2023-06-22 PROCEDURE — 2700000000 HC OXYGEN THERAPY PER DAY

## 2023-06-22 PROCEDURE — 80048 BASIC METABOLIC PNL TOTAL CA: CPT

## 2023-06-22 PROCEDURE — 36415 COLL VENOUS BLD VENIPUNCTURE: CPT

## 2023-06-22 PROCEDURE — 6370000000 HC RX 637 (ALT 250 FOR IP): Performed by: FAMILY MEDICINE

## 2023-06-22 PROCEDURE — 97535 SELF CARE MNGMENT TRAINING: CPT | Performed by: OCCUPATIONAL THERAPIST

## 2023-06-22 PROCEDURE — 94640 AIRWAY INHALATION TREATMENT: CPT

## 2023-06-22 PROCEDURE — 83880 ASSAY OF NATRIURETIC PEPTIDE: CPT

## 2023-06-22 PROCEDURE — 2580000003 HC RX 258: Performed by: FAMILY MEDICINE

## 2023-06-22 PROCEDURE — 6360000002 HC RX W HCPCS: Performed by: STUDENT IN AN ORGANIZED HEALTH CARE EDUCATION/TRAINING PROGRAM

## 2023-06-22 PROCEDURE — A4216 STERILE WATER/SALINE, 10 ML: HCPCS | Performed by: FAMILY MEDICINE

## 2023-06-22 PROCEDURE — 93246 EXT ECG>7D<15D RECORDING: CPT

## 2023-06-22 PROCEDURE — C9113 INJ PANTOPRAZOLE SODIUM, VIA: HCPCS | Performed by: FAMILY MEDICINE

## 2023-06-22 PROCEDURE — 97110 THERAPEUTIC EXERCISES: CPT | Performed by: OCCUPATIONAL THERAPIST

## 2023-06-22 PROCEDURE — 6370000000 HC RX 637 (ALT 250 FOR IP): Performed by: STUDENT IN AN ORGANIZED HEALTH CARE EDUCATION/TRAINING PROGRAM

## 2023-06-22 PROCEDURE — 6370000000 HC RX 637 (ALT 250 FOR IP): Performed by: INTERNAL MEDICINE

## 2023-06-22 PROCEDURE — 71045 X-RAY EXAM CHEST 1 VIEW: CPT

## 2023-06-22 RX ORDER — AMOXICILLIN AND CLAVULANATE POTASSIUM 875; 125 MG/1; MG/1
1 TABLET, FILM COATED ORAL 2 TIMES DAILY
Qty: 14 TABLET | Refills: 0 | Status: SHIPPED | OUTPATIENT
Start: 2023-06-22 | End: 2023-06-29

## 2023-06-22 RX ORDER — L.ACID,PARA/B.BIFIDUM/S.THERM 8B CELL
1 CAPSULE ORAL DAILY
Qty: 15 CAPSULE | Refills: 0 | Status: SHIPPED | OUTPATIENT
Start: 2023-06-22 | End: 2023-07-07

## 2023-06-22 RX ORDER — DOXYCYCLINE HYCLATE 100 MG
100 TABLET ORAL EVERY 12 HOURS SCHEDULED
Qty: 10 TABLET | Refills: 0 | Status: SHIPPED | OUTPATIENT
Start: 2023-06-22 | End: 2023-06-27

## 2023-06-22 RX ORDER — IPRATROPIUM BROMIDE AND ALBUTEROL SULFATE 2.5; .5 MG/3ML; MG/3ML
1 SOLUTION RESPIRATORY (INHALATION) 2 TIMES DAILY
Status: DISCONTINUED | OUTPATIENT
Start: 2023-06-22 | End: 2023-06-22 | Stop reason: HOSPADM

## 2023-06-22 RX ORDER — BENZONATATE 100 MG/1
100 CAPSULE ORAL 3 TIMES DAILY
Qty: 21 CAPSULE | Refills: 0 | Status: SHIPPED | OUTPATIENT
Start: 2023-06-22 | End: 2023-06-29

## 2023-06-22 RX ADMIN — GUAIFENESIN 1200 MG: 600 TABLET, EXTENDED RELEASE ORAL at 09:50

## 2023-06-22 RX ADMIN — ASPIRIN 325 MG ORAL TABLET 325 MG: 325 PILL ORAL at 09:49

## 2023-06-22 RX ADMIN — CYCLOSPORINE 1 DROP: 0.5 EMULSION OPHTHALMIC at 09:51

## 2023-06-22 RX ADMIN — BENZONATATE 100 MG: 100 CAPSULE ORAL at 09:49

## 2023-06-22 RX ADMIN — WATER 1000 MG: 1 INJECTION INTRAMUSCULAR; INTRAVENOUS; SUBCUTANEOUS at 03:42

## 2023-06-22 RX ADMIN — ATENOLOL 50 MG: 50 TABLET ORAL at 09:49

## 2023-06-22 RX ADMIN — FUROSEMIDE 40 MG: 10 INJECTION, SOLUTION INTRAMUSCULAR; INTRAVENOUS at 09:50

## 2023-06-22 RX ADMIN — IPRATROPIUM BROMIDE AND ALBUTEROL SULFATE 1 DOSE: 2.5; .5 SOLUTION RESPIRATORY (INHALATION) at 08:20

## 2023-06-22 RX ADMIN — DOXYCYCLINE HYCLATE 100 MG: 100 TABLET, COATED ORAL at 09:49

## 2023-06-22 RX ADMIN — SODIUM CHLORIDE 40 MG: 9 INJECTION INTRAMUSCULAR; INTRAVENOUS; SUBCUTANEOUS at 09:50

## 2023-06-22 RX ADMIN — Medication 10 ML: at 09:51

## 2023-06-22 RX ADMIN — ENOXAPARIN SODIUM 40 MG: 100 INJECTION SUBCUTANEOUS at 09:50

## 2023-06-22 RX ADMIN — BENZONATATE 100 MG: 100 CAPSULE ORAL at 14:14

## 2023-06-22 RX ADMIN — CALCIUM CARBONATE (ANTACID) CHEW TAB 500 MG 500 MG: 500 CHEW TAB at 14:14

## 2023-06-22 RX ADMIN — POTASSIUM CHLORIDE 20 MEQ: 750 TABLET, FILM COATED, EXTENDED RELEASE ORAL at 09:50

## 2023-06-22 RX ADMIN — CALCIUM CARBONATE (ANTACID) CHEW TAB 500 MG 500 MG: 500 CHEW TAB at 09:50

## 2023-06-22 ASSESSMENT — PAIN SCALES - GENERAL
PAINLEVEL_OUTOF10: 0

## 2023-06-22 NOTE — DISCHARGE SUMMARY
atenolol (TENORMIN) 50 MG tablet TAKE 1 TABLET DAILY  Qty: 30 tablet, Refills: 0    Associated Diagnoses: Essential (primary) hypertension      atorvastatin (LIPITOR) 80 MG tablet TAKE 1 TABLET NIGHTLY  Qty: 30 tablet, Refills: 0    Associated Diagnoses: Hyperlipidemia, unspecified hyperlipidemia type      FOLIC ACID PO Take by mouth daily      Multiple Vitamin (MULTIVITAMIN PO) Take 1 tablet by mouth daily      aspirin 325 MG tablet Take by mouth daily      coenzyme Q10 200 MG CAPS capsule Take by mouth daily      cycloSPORINE (RESTASIS) 0.05 % ophthalmic emulsion INSTILL 1 DROP INTO BOTH EYES TWICE A DAY      ibuprofen (ADVIL;MOTRIN) 200 MG CAPS capsule Take by mouth daily as needed                NOTIFY YOUR PHYSICIAN FOR ANY OF THE FOLLOWING:   Fever over 101 degrees for 24 hours. Chest pain, shortness of breath, fever, chills, nausea, vomiting, diarrhea, change in mentation, falling, weakness, bleeding. Severe pain or pain not relieved by medications. Or, any other signs or symptoms that you may have questions about.     DISPOSITION:   X Home With:   OT X PT X HH  RN       Long term SNF/Inpatient Rehab    Independent/assisted living    Hospice    Other:       PATIENT CONDITION AT DISCHARGE:     Functional status    Poor    X Deconditioned     Independent      Cognition    X Lucid     Forgetful     Dementia      Catheters/lines (plus indication)    [place]     PICC     PEG    X None      Code status    X Full code     DNR      PHYSICAL EXAMINATION AT DISCHARGE:    General : alert x 3, awake, no acute distress,   HEENT: PEERL, EOMI, moist mucus membrane, TM clear  Neck: supple, no JVD, no meningeal signs  Chest: Clear to auscultation bilaterally   CVS: S1 S2 heard, Capillary refill less than 2 seconds  Abd: soft/ Non tender, non distended, BS physiological,   Ext: no clubbing, no cyanosis, no edema, brisk 2+ DP pulses  Neuro/Psych: pleasant mood and affect, CN 2-12 grossly intact, sensory grossly within normal

## 2023-06-22 NOTE — DISCHARGE INSTRUCTIONS
Discharge Instructions       PATIENT ID: Jose Swann  MRN: [id number]   YOB: 1950    DATE OF ADMISSION: 6/16/2023   DATE OF DISCHARGE: 6/22/2023    PRIMARY CARE PROVIDER: Marianne Jackson     ATTENDING PHYSICIAN: Sylvia Pham MD   DISCHARGING PROVIDER: Sylvia Pham MD    To contact this individual call [phone] and ask the  to page. If unavailable ask to be transferred the Adult Hospitalist Department. DISCHARGE DIAGNOSES Severe sepsis secondary to community-acquired pneumonia    CONSULTATIONS: [unfilled]    PROCEDURES/SURGERIES: * No surgery found *    PENDING TEST RESULTS:   At the time of discharge the following test results are still pending:     FOLLOW UP APPOINTMENTS:   @[facility]     ADDITIONAL CARE RECOMMENDATIONS:     DIET: cardiac diet      ACTIVITY: activity as tolerated    WOUND CARE:     EQUIPMENT needed:       DISCHARGE MEDICATIONS:   See Medication Reconciliation Form    It is important that you take the medication exactly as they are prescribed. Keep your medication in the bottles provided by the pharmacist and keep a list of the medication names, dosages, and times to be taken in your wallet. Do not take other medications without consulting your doctor. NOTIFY YOUR PHYSICIAN FOR ANY OF THE FOLLOWING:   Fever over 101 degrees for 24 hours. Chest pain, shortness of breath, fever, chills, nausea, vomiting, diarrhea, change in mentation, falling, weakness, bleeding. Severe pain or pain not relieved by medications. Or, any other signs or symptoms that you may have questions about. DISPOSITION:  X  Home With:   OT X PT X HH  RN       SNF/Inpatient Rehab/LTAC    Independent/assisted living    Hospice    Other:     CDMP Checked: Yes X     PROBLEM LIST Updated:   Yes X       Signed:   Sylvia Pham MD  6/22/2023  12:49 PM

## 2023-06-22 NOTE — CARE COORDINATION
06/22/23 1235   Condition of Participation: Discharge Planning   The Patient and/or Patient Representative was provided with a Choice of Provider? Patient   The Patient and/Or Patient Representative agree with the Discharge Plan? Yes   Freedom of Choice list was provided with basic dialogue that supports the patient's individualized plan of care/goals, treatment preferences, and shares the quality data associated with the providers?   Yes

## 2023-06-22 NOTE — PLAN OF CARE
2000: Bedside shift change report given to [facility] (oncoming nurse) by Jeremy Guerrero (offgoing nurse). Report included the following information SBAR, Kardex, Intake/Output, MAR, and Cardiac Rhythm SR . Shift Summary:    No acute events occurred over the course of the shift     Pt resting comfortably in bed    0730: Bedside shift change report given to Jeremy Guerrero (oncoming nurse) by [facility] (offgoing nurse). Report included the following information SBAR, Kardex, Intake/Output, MAR, and Cardiac Rhythm SR .     Problem: Discharge Planning  Goal: Discharge to home or other facility with appropriate resources  Outcome: Progressing     Problem: Safety - Adult  Goal: Free from fall injury  Outcome: Progressing     Problem: Pain  Goal: Verbalizes/displays adequate comfort level or baseline comfort level  Outcome: Progressing     Problem: Chronic Conditions and Co-morbidities  Goal: Patient's chronic conditions and co-morbidity symptoms are monitored and maintained or improved  Outcome: Progressing
2000: Bedside shift change report given to [facility] (oncoming nurse) by Patrick Hall (offgoing nurse). Report included the following information SBAR, Kardex, Intake/Output, MAR, and Cardiac Rhythm SR . Shift Summary:    No acute events occurred over the course of the shift     Pt resting comfortably in bed    0730: Bedside shift change report given to Patrick Hall (oncoming nurse) by [facility] (offgoing nurse). Report included the following information SBAR, Kardex, Intake/Output, MAR, and Cardiac Rhythm SR . Problem: Discharge Planning  Goal: Discharge to home or other facility with appropriate resources  Outcome: Progressing     Problem: Safety - Adult  Goal: Free from fall injury  Outcome: Progressing     Problem: Pain  Goal: Verbalizes/displays adequate comfort level or baseline comfort level  Outcome: Progressing     Problem: Physical Therapy - Adult  Goal: By Discharge: Performs mobility at highest level of function for planned discharge setting. See evaluation for individualized goals. Description: FUNCTIONAL STATUS PRIOR TO ADMISSION: Patient was independent and active without use of DME. Retired, drives. HOME SUPPORT PRIOR TO ADMISSION: The patient lived with wife but did not require assistance. Physical Therapy Goals  Initiated 6/17/2023  1. Patient will move from supine to sit and sit to supine in bed with independence within 7 day(s). 2.  Patient will perform sit to stand with independence within 7 day(s). 3.  Patient will transfer from bed to chair and chair to bed with modified independence using the least restrictive device within 7 day(s). 4.  Patient will ambulate with supervision/set-up for 150 feet with the least restrictive device within 7 day(s).      6/19/2023 1628 by Park Giang PT  Outcome: Progressing     Problem: Chronic Conditions and Co-morbidities  Goal: Patient's chronic conditions and co-morbidity symptoms are monitored and maintained or improved  Outcome: Progressing
Problem: Discharge Planning  Goal: Discharge to home or other facility with appropriate resources  Outcome: Progressing     Problem: Safety - Adult  Goal: Free from fall injury  Outcome: Progressing     Problem: Pain  Goal: Verbalizes/displays adequate comfort level or baseline comfort level  Outcome: Progressing     Problem: Chronic Conditions and Co-morbidities  Goal: Patient's chronic conditions and co-morbidity symptoms are monitored and maintained or improved  6/19/2023 0341 by Fatemeh Dave RN  Outcome: Progressing  6/18/2023 1652 by Bobbi Hager RN  Outcome: Progressing
Problem: Discharge Planning  Goal: Discharge to home or other facility with appropriate resources  Outcome: Progressing     Problem: Safety - Adult  Goal: Free from fall injury  Outcome: Progressing     Problem: Pain  Goal: Verbalizes/displays adequate comfort level or baseline comfort level  Outcome: Progressing     Problem: Physical Therapy - Adult  Goal: By Discharge: Performs mobility at highest level of function for planned discharge setting. See evaluation for individualized goals. Description: FUNCTIONAL STATUS PRIOR TO ADMISSION: Patient was independent and active without use of DME. Retired, drives. HOME SUPPORT PRIOR TO ADMISSION: The patient lived with wife but did not require assistance. Physical Therapy Goals  Initiated 6/17/2023  1. Patient will move from supine to sit and sit to supine in bed with independence within 7 day(s). 2.  Patient will perform sit to stand with independence within 7 day(s). 3.  Patient will transfer from bed to chair and chair to bed with modified independence using the least restrictive device within 7 day(s). 4.  Patient will ambulate with supervision/set-up for 150 feet with the least restrictive device within 7 day(s).     6/21/2023 1537 by Fidel Mckenzie PT  Outcome: Progressing     Problem: Chronic Conditions and Co-morbidities  Goal: Patient's chronic conditions and co-morbidity symptoms are monitored and maintained or improved  Outcome: Progressing     Problem: Occupational Therapy - Adult  Goal: By Discharge: Performs self-care activities at highest level of function for planned discharge setting. See evaluation for individualized goals.   Description: FUNCTIONAL STATUS PRIOR TO ADMISSION:  Patient was ambulatory without use of DME.    , ADL Assistance: Independent,  ,  ,  ,  ,  , Homemaking Assistance: Independent, Ambulation Assistance: Independent, Transfer Assistance: Independent, Active : Yes     HOME SUPPORT: Patient lived with his spouse
Problem: Occupational Therapy - Adult  Goal: By Discharge: Performs self-care activities at highest level of function for planned discharge setting. See evaluation for individualized goals. Description: FUNCTIONAL STATUS PRIOR TO ADMISSION:  Patient was ambulatory without use of DME.    , ADL Assistance: Independent,  ,  ,  ,  ,  , Homemaking Assistance: Independent, Ambulation Assistance: Independent, Transfer Assistance: Independent, Active : Yes     HOME SUPPORT: Patient lived with his spouse but didn't require assistance. Occupational Therapy Goals:  Initiated 6/19/2023  1. Patient will perform standing grooming routine with Modified [place] within 7 day(s). 2.  Patient will perform upper and lower body bathing with Modified [place] within 7 day(s). 3.  Patient will perform upper and lower body dressing with Modified [place] within 7 day(s). 4.  Patient will perform toilet transfers with Modified [place]  within 7 day(s). 5.  Patient will perform all aspects of toileting with Modified [place] within 7 day(s). 6.  Patient will participate in upper extremity therapeutic exercise/activities with Supervision for 10 minutes within 7 day(s).     7.  Patient will utilize energy conservation techniques during functional activities with verbal cues within 7 day(s).    6/22/2023 1010 by KELLY Costello/L  Outcome: Progressing
Problem: Occupational Therapy - Adult  Goal: By Discharge: Performs self-care activities at highest level of function for planned discharge setting. See evaluation for individualized goals. Description: FUNCTIONAL STATUS PRIOR TO ADMISSION:  Patient was ambulatory without use of DME.    , ADL Assistance: Independent,  ,  ,  ,  ,  , Homemaking Assistance: Independent, Ambulation Assistance: Independent, Transfer Assistance: Independent, Active : Yes     HOME SUPPORT: Patient lived with his spouse but didn't require assistance. Occupational Therapy Goals:  Initiated 6/19/2023  1. Patient will perform standing grooming routine with Modified [place] within 7 day(s). 2.  Patient will perform upper and lower body bathing with Modified [place] within 7 day(s). 3.  Patient will perform upper and lower body dressing with Modified [place] within 7 day(s). 4.  Patient will perform toilet transfers with Modified [place]  within 7 day(s). 5.  Patient will perform all aspects of toileting with Modified [place] within 7 day(s). 6.  Patient will participate in upper extremity therapeutic exercise/activities with Supervision for 10 minutes within 7 day(s). 7.  Patient will utilize energy conservation techniques during functional activities with verbal cues within 7 day(s). Outcome: Progressing  OCCUPATIONAL THERAPY TREATMENT  Patient: Marsha Caruso (82 y.o. male)  Date: 6/21/2023  Primary Diagnosis: Hypoxia [R09.02]  Generalized weakness [R53.1]  Coronary artery disease involving autologous artery coronary bypass graft without angina pectoris [I25.810]  Pneumonia of left lung due to infectious organism, unspecified part of lung [J18.9]  CAP (community acquired pneumonia) due to Chlamydia species [J16.0]       Precautions:                  Chart, occupational therapy assessment, plan of care, and goals were reviewed.     ASSESSMENT  Patient continues to benefit from skilled OT
Problem: Occupational Therapy - Adult  Goal: By Discharge: Performs self-care activities at highest level of function for planned discharge setting. See evaluation for individualized goals. Description: FUNCTIONAL STATUS PRIOR TO ADMISSION:  Patient was ambulatory without use of DME.    , ADL Assistance: Independent,  ,  ,  ,  ,  , Homemaking Assistance: Independent, Ambulation Assistance: Independent, Transfer Assistance: Independent, Active : Yes     HOME SUPPORT: Patient lived with his spouse but didn't require assistance. Occupational Therapy Goals:  Initiated 6/19/2023  1. Patient will perform standing grooming routine with Modified [place] within 7 day(s). 2.  Patient will perform upper and lower body bathing with Modified [place] within 7 day(s). 3.  Patient will perform upper and lower body dressing with Modified [place] within 7 day(s). 4.  Patient will perform toilet transfers with Modified [place]  within 7 day(s). 5.  Patient will perform all aspects of toileting with Modified [place] within 7 day(s). 6.  Patient will participate in upper extremity therapeutic exercise/activities with Supervision for 10 minutes within 7 day(s). 7.  Patient will utilize energy conservation techniques during functional activities with verbal cues within 7 day(s). Outcome: Progressing     OCCUPATIONAL THERAPY TREATMENT  Patient: Sam Munguia (73 y.o. male)  Date: 6/22/2023  Primary Diagnosis: Hypoxia [R09.02]  Generalized weakness [R53.1]  Coronary artery disease involving autologous artery coronary bypass graft without angina pectoris [I25.810]  Pneumonia of left lung due to infectious organism, unspecified part of lung [J18.9]  CAP (community acquired pneumonia) due to Chlamydia species [J16.0]       Precautions:                  Chart, occupational therapy assessment, plan of care, and goals were reviewed.     ASSESSMENT  Patient continues to benefit from skilled OT
Problem: Physical Therapy - Adult  Goal: By Discharge: Performs mobility at highest level of function for planned discharge setting. See evaluation for individualized goals. Description: FUNCTIONAL STATUS PRIOR TO ADMISSION: Patient was independent and active without use of DME. Retired, drives. HOME SUPPORT PRIOR TO ADMISSION: The patient lived with wife but did not require assistance. Physical Therapy Goals  Initiated 6/17/2023  1. Patient will move from supine to sit and sit to supine in bed with independence within 7 day(s). 2.  Patient will perform sit to stand with independence within 7 day(s). 3.  Patient will transfer from bed to chair and chair to bed with modified independence using the least restrictive device within 7 day(s). 4.  Patient will ambulate with supervision/set-up for 150 feet with the least restrictive device within 7 day(s). Outcome: Progressing   PHYSICAL THERAPY TREATMENT    Patient: Lisa Griffin (56 y.o. male)  Date: 6/21/2023  Diagnosis: Hypoxia [R09.02]  Generalized weakness [R53.1]  Coronary artery disease involving autologous artery coronary bypass graft without angina pectoris [I25.810]  Pneumonia of left lung due to infectious organism, unspecified part of lung [J18.9]  CAP (community acquired pneumonia) due to Chlamydia species [J16.0] CAP (community acquired pneumonia) due to Chlamydia species      Precautions:                      ASSESSMENT:  Patient continues to benefit from skilled PT services and is progressing towards goals. Demo increasing tolerance to activity with decreased O2 requirement. Pt received in chair, 4L O2, eager to mobilize. Obtained portable telemetry box in prep for activity due to pt report of a flutter yesterday. Pt tolerated gait training on unit with CGA for balance, one brief standing rest break, SpO2 94-96%, HR 90s. Decreased O2 to 3L during seated rest break with pt maintaining SpO2 98%.  Pt tolerated second gait trial
Problem: Physical Therapy - Adult  Goal: By Discharge: Performs mobility at highest level of function for planned discharge setting. See evaluation for individualized goals. Description: FUNCTIONAL STATUS PRIOR TO ADMISSION: Patient was independent and active without use of DME. Retired, drives. HOME SUPPORT PRIOR TO ADMISSION: The patient lived with wife but did not require assistance. Physical Therapy Goals  Initiated 6/17/2023  1. Patient will move from supine to sit and sit to supine in bed with independence within 7 day(s). 2.  Patient will perform sit to stand with independence within 7 day(s). 3.  Patient will transfer from bed to chair and chair to bed with modified independence using the least restrictive device within 7 day(s). 4.  Patient will ambulate with supervision/set-up for 150 feet with the least restrictive device within 7 day(s). Outcome: Progressing   PHYSICAL THERAPY TREATMENT    Patient: Syd Macdonald (24 y.o. male)  Date: 6/19/2023  Diagnosis: Hypoxia [R09.02]  Generalized weakness [R53.1]  Coronary artery disease involving autologous artery coronary bypass graft without angina pectoris [I25.810]  Pneumonia of left lung due to infectious organism, unspecified part of lung [J18.9]  CAP (community acquired pneumonia) due to Chlamydia species [J16.0] CAP (community acquired pneumonia) due to Chlamydia species      Precautions:                      ASSESSMENT:  Patient continues to benefit from skilled PT services and is progressing towards goals. Pt was able to make gains today with amb. He amb 120 feet but with one noted loss of balance requiring min assist to maintain his balance. He was on 8 liters of 02 and his Sp02 was stable at 91-94%. Reviewed use of pursed lip breathing and use of incentive spirometer. Post session he remained up to the chair. I recommend that he is up amb with nursing, especially to and from the bathroom.  Anticipate steady gains and a return to
of discharging home versus a facility  [address], Corrie Esposito Si, Maria Esther Mcqueenper Silvia Kemp Roche Bobely, AM-PAC 6-Clicks Functional Assessment Scores Predict [address] Discharge Destination, Physical Therapy, Volume 94, Issue 9, 2014, Pages 0625-3575, [email or web address]       Pain Ratin/10     Activity Tolerance:   Good and Fair     After treatment:   Patient left in no apparent distress sitting up in chair and Call bell within reach    COMMUNICATION/EDUCATION:   The patient's plan of care was discussed with: registered nurse    Patient Education  Education Given To: Patient  Education Provided: Role of Therapy;Plan of Care;Precautions;Transfer Training;Energy Conservation  Education Method: Demonstration;Verbal  Barriers to Learning: None  Education Outcome: Verbalized understanding;Demonstrated understanding    Thank you for this referral.  EDDIE Crump, OTR/L  Minutes: 20    Occupational Therapy Evaluation Charge Determination   History Examination Decision-Making   LOW Complexity : Brief history review  LOW Complexity: 1-3 Performance deficits relating to physical, cognitive, or psychosocial skills that result in activity limitations and/or participation restrictions MEDIUM Complexity: Patient may present with comorbidities that affect occupational performance.  Minimal to moderate modifications of tasks or assist (eg. physical or verbal) with assist is necessary to enable pt to complete eval   Based on the above components, the patient evaluation is determined to be of the following complexity level: Low

## 2023-06-22 NOTE — PROGRESS NOTES
2200 Pt ambulating in hallway with minimal assistance. Pt on 5L O2, tolerated well, O2 >89%. I agree with Barbara Back RN's charting and assessment.     Jose Yao RN (preceptor)
[facility]   Cardiology Care Note                  []Initial visit     [x]Established visit     Patient Name: Gissel Cuellar - MPX:[id number]  Primary Cardiologist: Sandee Schafer MD       Reason for initial visit: afib with rvr, elevated troponin      Interval HPI/SUBJECTIVE:     Pt presented last week with sepsis, CAP. Dr. Yolis Jerome consulted last week for tachycardia which appeared to be ST/AT per Dr. Yolis Jerome perhaps brief run of PAF. Cardiology seeing pt back today as on surveillance of telemetry yesterday, pt noted to have episodes of PAFL. He denies any chest pain. Reports breathing is better but he is still requiring 5 L NC. Additionally he c/o persistent cough with deep inspiration. He is currently in NSR, with PACs but per nurse when he ambulated yesterday he had tachycardia. He had a fever again overnight. (101.7)  HS troponin on admission: [phone]      Assessment and Plan   Patient seen on the day of progress note and examined  and agree with Advance Practice Provider (MAHENDRA, NP,PA)  assessment and plans    Discussed with patient at length significant implication of atrial fibrillation and/or atrial flutter. In my opinion the patient is having paroxysmal episodes of atrial flutter although I cannot entirely ruled out atrial tachycardia. Discussed with him the initiation of oral anticoagulation. His CHADS2 DS 2 vascular score is 3. The patient is hesitant to start oral anticoagulation at this time understanding risks. He would like to hold off for longer and discussed with his wife. Proceed with event monitor as an outpatient. Continue aspirin for now. Continue atenolol. Once again the patient wants to hold off restarting at this point any oral anticoagulation such as Eliquis. Proceed with event monitor upon discharge for 2 weeks. We will continue to follow from a distance.
Hospitalist Progress Note  Edison Mendoza MD  Answering service: [phone] OR 36 from in house phone        Date of Service:  2023  NAME:  Delores Jolly  :  1950  MRN:  [id number]      Admission Summary:   Delores Jolly is a 67 y.o. male with hx of cad s/p cabg, htn, gerd, dyslipidemia who presented to ed with complaints of cough, congestion, fever and altered mental status. History is obtained from patient's spouse and chart review. Reportedly has been ill for the last 3 days. Wife reports increasing malaise, fatigue, difficulty ambulation, fevers and chills with temperature to 103. Wife states his symptoms have been mild until about 24 hours ago when he acutely worsened. Remarkable vitals on ER Presentation: Temto 100.7, heart rate 126, SPO2 88% on RA  Labs Remarkable for: Lactic acid 4.6, creatinine 1.5  ER Images: Chest x-ray showed lingula patchy airspace consolidation  ER Rx: 2 L normal saline bolus, Rocephin, azithromycin       Interval history / Subjective:   Patient seen and examined; reviewed his vitals labs test results and care plan  He has no complaints today other than shortness of breath and cough  Started on bicarb drip for acidosis yesterday   Repeat labs and cxr ordered for this am     Assessment & Plan:      Severe sepsis secondary to community-acquired pneumonia  -Continue with current IV antibiotics  -Viral respiratory panel flu and covid testing was negative  Pulmonary consulted and assisting with management  Patient required BiPAP on admission w/ ABG showing metabolic acidosis with respiratory compensation.   Wean oxygen as tolerated  He remains on high flow oxygen today- repeat CXR ordered    Metabolic and lactic acidosis- persists despite treatments  Cont bicarb drip and monitor labs- decreased rate from 75 to 50  Patient clinically looks better today
I agree with Mecca Vargas RN's charting and assessment.     Danielle Linder RN (preceptor)
Physician Progress Note      PATIENT:               Heddy Holter  CSN #:                  [id number]  :                       1950  ADMIT DATE:       2023 12:23 AM  DISCH DATE:  RESPONDING  PROVIDER #:        Bernardo Fleming MD          QUERY TEXT:    Dear Dr Khang Jose,  Please respond to the query below on behalf of the provider who is now off   service. Pt admitted with Severe sepsis secondary to community-acquired pneumonia. Noted documentation of HFpEF on  by ordered Pulmonary consultant. If   possible, please document in progress notes and discharge summary:    The medical record reflects the following:  Risk Factors: CAD s/p CABG x5, HTN, Dyslipedmia,    Clinical Indicators:   PN  Due to elevated troponin, afib w RVR, suspected pulm edema from elevated HR     Cardiology  Normal LV function by echo.  PN  -currently holding beta-blocker -cont aspirin; normal EF on echocardiogram   despite elevated BNP on labwork and cxr showing pulm edema     Pulmonology  HFpEF  Volume management - agree with diuresis     Echo  Left Ventricle: Normal left ventricular systolic function with a visually   estimated EF of 60 - 65%. Left ventricle size is normal. Normal wall   thickness. Normal wall motion. Abnormal diastolic function. NT pro-BNP: 9,269-9,464-4,270-6,064-6,652    Treatment: IMCU level of care, Cardiology Consult, Lasix IV, Lovenox, Echo,   BNP monitoring Daily, VS per unit routine, ASA 325mg Daily, Atenolol, Lipitor,   Chest imaging, Elevate HOB 30 degrees, PRN Oxygen Therapy protocol    Thank you,  SLY MirzaN, RN, CCRN-K, CRCR  Jabber: [phone]  I am also available via PS.   Options provided:  -- Chronic HFpEF confirmed present on admission  -- Acute HFpEF confirmed present on admission  -- Acute on Chronic HFpEF confirmed present on admission  -- HFpEF ruled out  -- Other - I will add my own diagnosis  -- Disagree - Not applicable / Not valid  --
Pulmonary, Critical Care, and Sleep Medicine~Progress Note    Name: Balbina Mitchell MRN: [id number]   : 1950 Hospital: [address]   Date: 2023 11:20 AM Admission: 2023     Impression Plan   Acute hypoxic respiratory failure  Community-acquired pneumonia  A fib   BRADEN  Lactic acidosis  Elevated troponin per cards  History of coronary disease: Status post 5 vessel CABG   Hypertension Can stop NIV at night  O2 titration above 90%, wean as able   Agree with Doxy/Rocephin  DVT prophylaxis  Steroids completed   On Duonebs  On lasix   Antitussives   Will need follow up chest film in 4-6 wks after discharge      Daily Progression:      Feeling better  Does still have episodes of tachycardia with any exertion   Chest film today  IMPRESSION:  1. Improved aeration with decreasing bibasilar atelectasis. Continued follow-up  is suggested. ECHO:  Result status: Final result       Left Ventricle: Normal left ventricular systolic function with a visually estimated EF of 60 - 65%. Left ventricle size is normal. Normal wall thickness. Normal wall motion. Abnormal diastolic function.   Consult Note requested by hospitalist service    Patient presented for worsening shortness of breath, malaise, congestion, cough and subjective fevers over the last few days. Patient has recently been seen in his grandchildren there were viral illnesses being passed around amongst them. Patient does go on to state that he had been on prednisone for around 20 days for a secondary issue unrelated to shortness of breath just prior to seeing all of his family. He had no white count on admission. Did have an elevated proBNP at 3225 in the setting of BRADEN. Pro-Jeff was 58. 38. Lactic was 4. He was febrile on admission. Imaging of his chest showed bibasilar infiltrates. He denies any aspiration event. No history of autoimmune diseases. Denies any history of cancer.   He was a smoker but stopped
Pulmonary, Critical Care, and Sleep Medicine~Progress Note    Name: Valentine West MRN: [id number]   : 1950 Hospital: [facility]   Date: 2023 11:46 AM Admission: 2023     Impression Plan   Acute hypoxic respiratory failure  Community-acquired pneumonia  A fib   BRADEN  Lactic acidosis  Elevated troponin per cards  History of coronary disease: Status post 5 vessel CABG   Hypertension O2 titration above 90%, now on room air   Agree with Doxy/Rocephin; suggest augmentin to complete 14 day course of abx  DVT prophylaxis  Steroids completed   On Duonebs  On lasix   Antitussives   Will need follow up chest CT scan in 4-6 wks after discharge with Dr Kristin Gonzalez   Discussed with Dr Arno Oppenheim      Daily Progression:      Off O2  Noted CT scan   No more fevers       Feels much better today  Noted chest x-ray today       Feeling better  Does still have episodes of tachycardia with any exertion   Chest film today  IMPRESSION:  1. Improved aeration with decreasing bibasilar atelectasis. Continued follow-up  is suggested. ECHO:  Result status: Final result       Left Ventricle: Normal left ventricular systolic function with a visually estimated EF of 60 - 65%. Left ventricle size is normal. Normal wall thickness. Normal wall motion. Abnormal diastolic function.   Consult Note requested by hospitalist service    Patient presented for worsening shortness of breath, malaise, congestion, cough and subjective fevers over the last few days. Patient has recently been seen in his grandchildren there were viral illnesses being passed around amongst them. Patient does go on to state that he had been on prednisone for around 20 days for a secondary issue unrelated to shortness of breath just prior to seeing all of his family. He had no white count on admission. Did have an elevated proBNP at 3225 in the setting of BRADEN. Pro-Jeff was 58. 38. Lactic was 4. He was febrile on admission.
of systems not obtained due to patient factors. 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    Uveitis of right eye 4/14/2022      Past Surgical History:   Procedure Laterality Date    BLADDER REPAIR  01/2018    bladder resection    COLONOSCOPY      COLONOSCOPY N/A 7/19/2016    COLONOSCOPY performed by Lila Smallwood MD at [address]      no polyps 2006    CORONARY ARTERY BYPASS GRAFT      x 5 arteries     CYST REMOVAL  1975    HERNIA REPAIR Left 06/04/2020    OTHER SURGICAL HISTORY      pilonidal cyst removed    SD UNLISTED PROCEDURE CARDIAC SURGERY  2004    CABG x 5 vessels    TONSILLECTOMY      TONSILLECTOMY      AS A YOUNG CHILD    UROLOGICAL SURGERY      CYSTOSCOPY, HAD BLADDER \"POLYP\" REMOVED      Prior to Admission medications    Medication Sig Start Date End Date Taking?  Authorizing Provider   atenolol (TENORMIN) 50 MG tablet TAKE 1 TABLET DAILY 6/5/23   DANISHA Jiang NP   atorvastatin (LIPITOR) 80 MG tablet TAKE 1 TABLET NIGHTLY 6/5/23   DANISHA Jiang NP   FOLIC ACID PO Take by mouth daily    Ar Automatic Reconciliation   Multiple Vitamin (MULTIVITAMIN PO) Take 1 tablet by mouth daily    Ar Automatic Reconciliation   aspirin 325 MG tablet Take by mouth daily    Ar Automatic Reconciliation   coenzyme Q10 200 MG CAPS capsule Take by mouth daily 10/10/12   Ar Automatic Reconciliation
- NP   atorvastatin (LIPITOR) 80 MG tablet TAKE 1 TABLET NIGHTLY 23   Shelly PITA DANISHA Whitmore - NP   FOLIC ACID PO Take by mouth daily    Ar Automatic Reconciliation   Multiple Vitamin (MULTIVITAMIN PO) Take 1 tablet by mouth daily    Ar Automatic Reconciliation   aspirin 325 MG tablet Take by mouth daily    Ar Automatic Reconciliation   coenzyme Q10 200 MG CAPS capsule Take by mouth daily 10/10/12   Ar Automatic Reconciliation   cycloSPORINE (RESTASIS) 0.05 % ophthalmic emulsion INSTILL 1 DROP INTO BOTH EYES TWICE A DAY 22   Ar Automatic Reconciliation   ibuprofen (ADVIL;MOTRIN) 200 MG CAPS capsule Take by mouth daily as needed    Ar Automatic Reconciliation     No Known Allergies   Social History     Tobacco Use    Smoking status: Former     Packs/day: 1.00     Types: Cigarettes     Quit date: 3/31/1980     Years since quittin.2    Smokeless tobacco: Never   Substance Use Topics    Alcohol use: Yes     Alcohol/week: 5.0 standard drinks     Types: 5 Standard drinks or equivalent per week     Comment: socially      Family History   Problem Relation Age of Onset    No Known Problems Daughter     Anesth Problems Neg Hx     No Known Problems Daughter     Cataracts Sister     High Cholesterol Sister     Thyroid Disease Sister     Cataracts Father     Heart Disease Father     Other Mother         duodenal ulcer    Cataracts Mother     High Cholesterol Mother     Stroke Mother     Hypertension Mother     Osteoarthritis Mother      OBJECTIVE:     Vital Signs:     BP (!) 143/79   Pulse 78   Temp 98.6 °F (37 °C) (Oral)   Resp 17   Ht 1.753 m (5' 9\")   Wt 79.9 kg (176 lb 2.4 oz)   SpO2 97%   BMI 26.01 kg/m²    Temp (24hrs), Av.5 °F (37.5 °C), Min:98.2 °F (36.8 °C), Max:101.7 °F (38.7 °C)     Intake/Output:     Last shift: No intake/output data recorded.     Last 3 shifts:  1901 -  0700  In: -   Out: 1310 [Urine:1310]        Intake/Output Summary (Last 24 hours) at 2023 1231  Last data
BUN/Creatinine:    Lab Results   Component Value Date/Time    BUN 25 06/20/2023 01:56 AM    CREATININE 0.76 06/20/2023 01:56 AM      Recent Labs     06/19/23  0359 06/20/23  0156   WBC 18.7* 14.6*   HGB 12.5 12.4   HCT 37.5 38.0   PLT 97* 117*       Recent Labs     06/18/23  1011 06/19/23  0359 06/20/23  0156    143 139   K 3.1* 3.2* 3.2*   * 108 106   CO2 27 29 28   BUN 22* 28* 25*       No results for input(s): ALT, TP, ALB, GLOB, GGT, AML in the last 72 hours. Invalid input(s): SGOT, GPT, AP, TBIL, TBILI, AMYP, LPSE, HLPSE    No results for input(s): INR, APTT in the last 72 hours. Invalid input(s): PTP   No results for input(s): TIBC, FERR in the last 72 hours. Invalid input(s): FE, PSAT   No results found for: FOL, RBCF   No results for input(s): PH, PCO2, PO2 in the last 72 hours. No results for input(s): CPK in the last 72 hours.     Invalid input(s): CPKMB, CKNDX, TROIQ  Lab Results   Component Value Date/Time    CHOL 131 04/26/2023 10:37 AM    HDL 54 04/26/2023 10:37 AM     No results found for: GLUCPOC        Medications Reviewed:     Current Facility-Administered Medications   Medication Dose Route Frequency    potassium chloride (KLOR-CON) extended release tablet 20 mEq  20 mEq Oral Daily    doxycycline hyclate (VIBRA-TABS) tablet 100 mg  100 mg Oral 2 times per day    furosemide (LASIX) injection 40 mg  40 mg IntraVENous Daily    pantoprazole (PROTONIX) 40 mg in sodium chloride (PF) 0.9 % 10 mL injection  40 mg IntraVENous Q12H    calcium carbonate (TUMS) chewable tablet 500 mg  500 mg Oral TID    guaiFENesin (MUCINEX) extended release tablet 1,200 mg  1,200 mg Oral BID    benzonatate (TESSALON) capsule 100 mg  100 mg Oral TID    guaiFENesin-dextromethorphan (ROBITUSSIN DM) 100-10 MG/5ML syrup 10 mL  10 mL Oral Q6H PRN    ipratropium 0.5 mg-albuterol 2.5 mg (DUONEB) nebulizer solution 1 Dose  1 Dose Inhalation TID    melatonin tablet 3 mg  3 mg Oral Nightly PRN    aspirin tablet
reviewed from all clinical/nonclinical/nursing services involved in patient's clinical care. Care coordination discussions were held with appropriate clinical/nonclinical/ nursing providers based on care coordination needs. Labs:     BUN/Creatinine:    Lab Results   Component Value Date/Time    BUN 26 06/21/2023 05:25 AM    CREATININE 0.72 06/21/2023 05:25 AM      Recent Labs     06/20/23  0156 06/21/23  0525   WBC 14.6* 14.3*   HGB 12.4 12.6   HCT 38.0 38.4   * 176       Recent Labs     06/19/23  0359 06/20/23  0156 06/21/23  0525    139 139   K 3.2* 3.2* 3.7    106 106   CO2 29 28 25   BUN 28* 25* 26*       No results for input(s): ALT, TP, ALB, GLOB, GGT, AML in the last 72 hours. Invalid input(s): SGOT, GPT, AP, TBIL, TBILI, AMYP, LPSE, HLPSE    No results for input(s): INR, APTT in the last 72 hours. Invalid input(s): PTP   No results for input(s): TIBC, FERR in the last 72 hours. Invalid input(s): FE, PSAT   No results found for: FOL, RBCF   No results for input(s): PH, PCO2, PO2 in the last 72 hours. No results for input(s): CPK in the last 72 hours.     Invalid input(s): CPKMB, CKNDX, TROIQ  Lab Results   Component Value Date/Time    CHOL 131 04/26/2023 10:37 AM    HDL 54 04/26/2023 10:37 AM     No results found for: GLUCPOC        Medications Reviewed:     Current Facility-Administered Medications   Medication Dose Route Frequency    potassium chloride (KLOR-CON) extended release tablet 20 mEq  20 mEq Oral Daily    doxycycline hyclate (VIBRA-TABS) tablet 100 mg  100 mg Oral 2 times per day    atenolol (TENORMIN) tablet 50 mg  50 mg Oral Daily    furosemide (LASIX) injection 40 mg  40 mg IntraVENous Daily    pantoprazole (PROTONIX) 40 mg in sodium chloride (PF) 0.9 % 10 mL injection  40 mg IntraVENous Q12H    calcium carbonate (TUMS) chewable tablet 500 mg  500 mg Oral TID    guaiFENesin (MUCINEX) extended release tablet 1,200 mg  1,200 mg Oral BID    benzonatate (TESSALON)

## 2023-06-22 NOTE — CARE COORDINATION
Care Management Initial Assessment       RUR: 9%  Readmission? No  1st IM letter given? Yes -   2nd IM letter given: 06/22/23  1st  letter given: No, n/a    HH orders and AVS sent to [facility] through [address] [place] [address],[facility]. Pt chose [facility] [phone] for [facility] agency. [facility] has accepted. [facility] details were added to AVS.     Family at bedside: wife, daughter, and two grandchildren. Wife will drive pt home at time of dc.      MICHELLE Starr

## 2023-06-23 ENCOUNTER — TELEPHONE (OUTPATIENT)
Dept: PRIMARY CARE CLINIC | Facility: CLINIC | Age: 73
End: 2023-06-23

## 2023-06-23 NOTE — TELEPHONE ENCOUNTER
Care Transitions Initial Follow Up Call    Outreach made within 2 business days of discharge: Yes    Patient: Aravind Davis Patient : 1950   MRN: [id number]  Reason for Admission: hypoxia, generalized weakness, CAD involving autologous coronary bypass graft without angina pectoris, pneumonia of left lung due to infectious organism, unspecified part of lung, CAP(community acquired pneumonia) due to chlamydia species   Discharge Date: 23       Spoke with: patient    Discharge department/facility: [facility] Interactive Patient Contact:  Was patient able to fill all prescriptions: Yes  Was patient instructed to bring all medications to the follow-up visit: Yes  Is patient taking all medications as directed in the discharge summary?  Yes  Does patient understand their discharge instructions: Yes  Does patient have questions or concerns that need addressed prior to 7-14 day follow up office visit: yes - moved up follow up appointment with pcp    Scheduled appointment with PCP within 7-14 days    Follow Up  Future Appointments   Date Time Provider Roise Hines   2023 10:40 AM DANISHA Blood - NP [facility] BS AMB   2023  1:40 PM MD ACE Torres BS AMB   5/3/2024  9:00 AM MD Ethan Torres Knee BS AMB       Avi Gomez LPN

## 2023-06-24 ENCOUNTER — HOSPITAL ENCOUNTER (EMERGENCY)
Facility: HOSPITAL | Age: 73
Discharge: HOME | End: 2023-06-24
Attending: EMERGENCY MEDICINE
Payer: MEDICARE

## 2023-06-24 VITALS
SYSTOLIC BLOOD PRESSURE: 144 MMHG | HEART RATE: 75 BPM | TEMPERATURE: 97.8 F | BODY MASS INDEX: 24.81 KG/M2 | OXYGEN SATURATION: 95 % | RESPIRATION RATE: 16 BRPM | DIASTOLIC BLOOD PRESSURE: 93 MMHG | WEIGHT: 167.99 LBS

## 2023-06-24 DIAGNOSIS — B02.9 HERPES ZOSTER WITHOUT COMPLICATION: ICD-10-CM

## 2023-06-24 DIAGNOSIS — R51.9 SCALP PAIN: Primary | ICD-10-CM

## 2023-06-24 PROCEDURE — 99283 EMERGENCY DEPT VISIT LOW MDM: CPT

## 2023-06-24 RX ORDER — VALACYCLOVIR HYDROCHLORIDE 1 G/1
1000 TABLET, FILM COATED ORAL 3 TIMES DAILY
Qty: 21 TABLET | Refills: 0 | Status: SHIPPED | OUTPATIENT
Start: 2023-06-24 | End: 2023-07-01

## 2023-06-24 ASSESSMENT — PAIN - FUNCTIONAL ASSESSMENT: PAIN_FUNCTIONAL_ASSESSMENT: 0-10

## 2023-06-24 ASSESSMENT — PAIN DESCRIPTION - ORIENTATION: ORIENTATION: LEFT

## 2023-06-24 ASSESSMENT — PAIN SCALES - GENERAL: PAINLEVEL_OUTOF10: 3

## 2023-06-24 ASSESSMENT — ENCOUNTER SYMPTOMS
COUGH: 0
SORE THROAT: 0
EYE PAIN: 0
ABDOMINAL PAIN: 0
BACK PAIN: 0

## 2023-06-24 ASSESSMENT — PAIN DESCRIPTION - LOCATION: LOCATION: EAR

## 2023-06-24 ASSESSMENT — PAIN DESCRIPTION - PAIN TYPE: TYPE: ACUTE PAIN

## 2023-06-24 ASSESSMENT — PAIN DESCRIPTION - DESCRIPTORS: DESCRIPTORS: SHOOTING

## 2023-06-24 NOTE — ED PROVIDER NOTES
[facility] EMERGENCY DEP  EMERGENCY DEPARTMENT ENCOUNTER      Pt Name: Paddy Cook  MRN: [id number]  Rommelgfbeatrice 1950  Date of evaluation: 6/24/2023  Provider: Rekha Bucio MD    CHIEF COMPLAINT       Chief Complaint   Patient presents with    Headache         HISTORY OF PRESENT ILLNESS    Marian Morel is a 68 yo M with left scalp pain. He states the pain started last night and kept him up most of the night  It is burning and occasionally shooting and radiates from the top of his head to his left ear. He was recently admitted to the hospital for pneumonia and states that he has been improving there. His scalp is tender to light touch. He denies any eye pain or irritation. Additional history from independent historians:     Review of External Medical Records:     Nursing Notes were reviewed. REVIEW OF SYSTEMS       Review of Systems   Constitutional:  Negative for fever. HENT:  Negative for sore throat. Scalp pain   Eyes:  Negative for pain and visual disturbance. Respiratory:  Negative for cough. Cardiovascular:  Negative for chest pain. Gastrointestinal:  Negative for abdominal pain. Genitourinary:  Negative for dysuria. Musculoskeletal:  Negative for back pain. Skin:  Negative for rash. Neurological:  Negative for headaches. Except as noted above the remainder of the review of systems was reviewed and negative.        PAST MEDICAL HISTORY     Past Medical History:   Diagnosis Date    Benign bladder papilloma     CAD (coronary artery disease) 10/7/2009    s/p CABG x 5, radial artery and internal mammary, nl stress test 3/08    Carotid artery plaque     mild on life line screening 6/11    Cataract     TIAGO, BEING WATCHED CURRENTLY    Diverticulosis     ED (erectile dysfunction) 10/7/2009    GERD (gastroesophageal reflux disease)     Hyperlipidemia LDL goal < 70 10/7/2009    Hypertension 10/7/2009    IGT (impaired glucose tolerance)     Keratitis     Low back

## 2023-06-28 ENCOUNTER — OFFICE VISIT (OUTPATIENT)
Dept: PRIMARY CARE CLINIC | Facility: CLINIC | Age: 73
End: 2023-06-28
Payer: MEDICARE

## 2023-06-28 VITALS
OXYGEN SATURATION: 95 % | BODY MASS INDEX: 24.44 KG/M2 | RESPIRATION RATE: 16 BRPM | SYSTOLIC BLOOD PRESSURE: 104 MMHG | WEIGHT: 165 LBS | HEART RATE: 78 BPM | DIASTOLIC BLOOD PRESSURE: 68 MMHG | TEMPERATURE: 97.3 F | HEIGHT: 69 IN

## 2023-06-28 DIAGNOSIS — I77.9 CAROTID ARTERY DISEASE WITHOUT CEREBRAL INFARCTION (HCC): Primary | ICD-10-CM

## 2023-06-28 DIAGNOSIS — Z76.89 ENCOUNTER FOR SUPPORT AND COORDINATION OF TRANSITION OF CARE: ICD-10-CM

## 2023-06-28 DIAGNOSIS — Z86.19 HISTORY OF SEPSIS: ICD-10-CM

## 2023-06-28 DIAGNOSIS — R73.03 PREDIABETES: ICD-10-CM

## 2023-06-28 DIAGNOSIS — Z09 HOSPITAL DISCHARGE FOLLOW-UP: ICD-10-CM

## 2023-06-28 DIAGNOSIS — I10 ESSENTIAL (PRIMARY) HYPERTENSION: ICD-10-CM

## 2023-06-28 DIAGNOSIS — Z87.01 HISTORY OF COMMUNITY ACQUIRED PNEUMONIA: ICD-10-CM

## 2023-06-28 DIAGNOSIS — I48.0 PAF (PAROXYSMAL ATRIAL FIBRILLATION) (HCC): ICD-10-CM

## 2023-06-28 DIAGNOSIS — B02.7 DISSEMINATED HERPES ZOSTER: ICD-10-CM

## 2023-06-28 DIAGNOSIS — E78.5 HYPERLIPIDEMIA WITH TARGET LDL LESS THAN 70: ICD-10-CM

## 2023-06-28 DIAGNOSIS — I25.810 CORONARY ARTERY DISEASE INVOLVING CORONARY BYPASS GRAFT OF NATIVE HEART WITHOUT ANGINA PECTORIS: ICD-10-CM

## 2023-06-28 PROCEDURE — 1123F ACP DISCUSS/DSCN MKR DOCD: CPT | Performed by: NURSE PRACTITIONER

## 2023-06-28 PROCEDURE — 3078F DIAST BP <80 MM HG: CPT | Performed by: NURSE PRACTITIONER

## 2023-06-28 PROCEDURE — 3074F SYST BP LT 130 MM HG: CPT | Performed by: NURSE PRACTITIONER

## 2023-06-28 PROCEDURE — 99214 OFFICE O/P EST MOD 30 MIN: CPT | Performed by: NURSE PRACTITIONER

## 2023-06-28 PROCEDURE — 3017F COLORECTAL CA SCREEN DOC REV: CPT | Performed by: NURSE PRACTITIONER

## 2023-06-28 PROCEDURE — G8427 DOCREV CUR MEDS BY ELIG CLIN: HCPCS | Performed by: NURSE PRACTITIONER

## 2023-06-28 PROCEDURE — 1111F DSCHRG MED/CURRENT MED MERGE: CPT | Performed by: NURSE PRACTITIONER

## 2023-06-28 PROCEDURE — G8420 CALC BMI NORM PARAMETERS: HCPCS | Performed by: NURSE PRACTITIONER

## 2023-06-28 PROCEDURE — 1036F TOBACCO NON-USER: CPT | Performed by: NURSE PRACTITIONER

## 2023-06-28 RX ORDER — ATENOLOL 50 MG/1
50 TABLET ORAL
Qty: 90 TABLET | Refills: 1 | Status: SHIPPED | OUTPATIENT
Start: 2023-06-28

## 2023-06-28 RX ORDER — ATORVASTATIN CALCIUM 80 MG/1
80 TABLET, FILM COATED ORAL NIGHTLY
Qty: 90 TABLET | Refills: 1 | Status: SHIPPED | OUTPATIENT
Start: 2023-06-28

## 2023-06-28 SDOH — ECONOMIC STABILITY: HOUSING INSECURITY
IN THE LAST 12 MONTHS, WAS THERE A TIME WHEN YOU DID NOT HAVE A STEADY PLACE TO SLEEP OR SLEPT IN A SHELTER (INCLUDING NOW)?: PATIENT REFUSED

## 2023-06-28 SDOH — ECONOMIC STABILITY: FOOD INSECURITY: WITHIN THE PAST 12 MONTHS, YOU WORRIED THAT YOUR FOOD WOULD RUN OUT BEFORE YOU GOT MONEY TO BUY MORE.: PATIENT DECLINED

## 2023-06-28 SDOH — ECONOMIC STABILITY: FOOD INSECURITY: WITHIN THE PAST 12 MONTHS, THE FOOD YOU BOUGHT JUST DIDN'T LAST AND YOU DIDN'T HAVE MONEY TO GET MORE.: PATIENT DECLINED

## 2023-06-28 SDOH — ECONOMIC STABILITY: INCOME INSECURITY: HOW HARD IS IT FOR YOU TO PAY FOR THE VERY BASICS LIKE FOOD, HOUSING, MEDICAL CARE, AND HEATING?: PATIENT DECLINED

## 2023-06-28 ASSESSMENT — PATIENT HEALTH QUESTIONNAIRE - PHQ9
2. FEELING DOWN, DEPRESSED OR HOPELESS: 0
SUM OF ALL RESPONSES TO PHQ QUESTIONS 1-9: 0
SUM OF ALL RESPONSES TO PHQ9 QUESTIONS 1 & 2: 0
SUM OF ALL RESPONSES TO PHQ QUESTIONS 1-9: 0
SUM OF ALL RESPONSES TO PHQ QUESTIONS 1-9: 0
1. LITTLE INTEREST OR PLEASURE IN DOING THINGS: 0
SUM OF ALL RESPONSES TO PHQ QUESTIONS 1-9: 0

## 2023-06-28 ASSESSMENT — ENCOUNTER SYMPTOMS
STRIDOR: 0
SHORTNESS OF BREATH: 0
COUGH: 0
WHEEZING: 0
CHEST TIGHTNESS: 0

## 2023-07-14 LAB — ECHO BSA: 1.95 M2

## 2023-07-26 ENCOUNTER — OFFICE VISIT (OUTPATIENT)
Age: 73
End: 2023-07-26
Payer: MEDICARE

## 2023-07-26 VITALS
DIASTOLIC BLOOD PRESSURE: 86 MMHG | OXYGEN SATURATION: 96 % | BODY MASS INDEX: 23.96 KG/M2 | HEIGHT: 69 IN | HEART RATE: 78 BPM | WEIGHT: 161.8 LBS | SYSTOLIC BLOOD PRESSURE: 118 MMHG

## 2023-07-26 DIAGNOSIS — I25.10 CORONARY ARTERY DISEASE INVOLVING NATIVE CORONARY ARTERY OF NATIVE HEART WITHOUT ANGINA PECTORIS: Primary | ICD-10-CM

## 2023-07-26 DIAGNOSIS — I48.0 PAROXYSMAL ATRIAL FIBRILLATION (HCC): ICD-10-CM

## 2023-07-26 DIAGNOSIS — E78.2 MIXED HYPERLIPIDEMIA: ICD-10-CM

## 2023-07-26 DIAGNOSIS — I10 PRIMARY HYPERTENSION: ICD-10-CM

## 2023-07-26 PROCEDURE — G8427 DOCREV CUR MEDS BY ELIG CLIN: HCPCS | Performed by: SPECIALIST

## 2023-07-26 PROCEDURE — 3079F DIAST BP 80-89 MM HG: CPT | Performed by: SPECIALIST

## 2023-07-26 PROCEDURE — 3074F SYST BP LT 130 MM HG: CPT | Performed by: SPECIALIST

## 2023-07-26 PROCEDURE — 99213 OFFICE O/P EST LOW 20 MIN: CPT | Performed by: SPECIALIST

## 2023-07-26 PROCEDURE — 3017F COLORECTAL CA SCREEN DOC REV: CPT | Performed by: SPECIALIST

## 2023-07-26 PROCEDURE — G8420 CALC BMI NORM PARAMETERS: HCPCS | Performed by: SPECIALIST

## 2023-07-26 PROCEDURE — 1036F TOBACCO NON-USER: CPT | Performed by: SPECIALIST

## 2023-07-26 PROCEDURE — 1123F ACP DISCUSS/DSCN MKR DOCD: CPT | Performed by: SPECIALIST

## 2023-07-26 ASSESSMENT — PATIENT HEALTH QUESTIONNAIRE - PHQ9
SUM OF ALL RESPONSES TO PHQ9 QUESTIONS 1 & 2: 0
SUM OF ALL RESPONSES TO PHQ QUESTIONS 1-9: 0
2. FEELING DOWN, DEPRESSED OR HOPELESS: 0
SUM OF ALL RESPONSES TO PHQ QUESTIONS 1-9: 0
1. LITTLE INTEREST OR PLEASURE IN DOING THINGS: 0

## 2023-07-26 NOTE — PROGRESS NOTES
HISTORY OF PRESENT ILLNESS  Naima Ochoa is a 67 y.o. male     SUMMARY:   Patient Active Problem List   Diagnosis    Cataract    Urinary bladder papilloma    Low back pain    Carotid artery disease without cerebral infarction (HCC)    Hypertension    IGT (impaired glucose tolerance)    Hyperlipidemia with target LDL less than 70    CAD (coronary artery disease)    Benign bladder papilloma    Keratitis    Vitamin D deficiency    ED (erectile dysfunction)    Advanced directives, counseling/discussion    Uveitis of right eye            CARDIOLOGY STUDIES TO DATE:  4/11 normal stress echo   6/11 life line screen , mild bilateral carotid disease   4/13 carotid dopplers 10-49% right and 0-9% left stenoses   4/15 carotid dopplers 10-49% right and 0-9% left stenoses   2/18 normal stress echo   4/19 carotid dopplers, mild bilateral stenosis, no change  12/20 neg AAA screening    6/23 normal echo  6/23 14d event monitor, rare pac,pvc, no afib    Chief Complaint   Patient presents with    1 Month Follow-Up    Follow-Up from Hospital    Coronary Artery Disease    Atrial Fibrillation    Hyperlipidemia       HPI :  About 6 weeks ago he had sudden hearing loss in his left ear went to ENT and was put on high-dose steroids and his hearing is nearly recovered, however shortly into the course of steroids he developed fevers chills and shortness of breath and ended up with a fairly serious pneumonia and was hospitalized for about a week. Presumably this happened because of his impaired immune system. During the hospitalization he had brief episode of A-fib which he was not aware of. He also had an echocardiogram which I reviewed and summarized above. We put a 14-day monitor on him and he had no further A-fib. Recent lipid profile looked outstanding and his blood pressure is well controlled.   He is back to exercising regularly    CARDIAC ROS:   negative for chest pain, claudication, dyspnea, irregular heart beat,

## 2023-10-06 ENCOUNTER — OFFICE VISIT (OUTPATIENT)
Dept: PRIMARY CARE CLINIC | Facility: CLINIC | Age: 73
End: 2023-10-06

## 2023-10-06 VITALS
RESPIRATION RATE: 16 BRPM | WEIGHT: 167 LBS | SYSTOLIC BLOOD PRESSURE: 120 MMHG | HEART RATE: 71 BPM | DIASTOLIC BLOOD PRESSURE: 80 MMHG | BODY MASS INDEX: 24.73 KG/M2 | TEMPERATURE: 97.1 F | OXYGEN SATURATION: 96 % | HEIGHT: 69 IN

## 2023-10-06 DIAGNOSIS — Z01.89 ENCOUNTER FOR ROUTINE LABORATORY TESTING: ICD-10-CM

## 2023-10-06 DIAGNOSIS — R73.03 PREDIABETES: ICD-10-CM

## 2023-10-06 DIAGNOSIS — L65.9 ALOPECIA: ICD-10-CM

## 2023-10-06 DIAGNOSIS — I25.810 CORONARY ARTERY DISEASE INVOLVING CORONARY BYPASS GRAFT OF NATIVE HEART WITHOUT ANGINA PECTORIS: ICD-10-CM

## 2023-10-06 DIAGNOSIS — J01.90 ACUTE BACTERIAL SINUSITIS: ICD-10-CM

## 2023-10-06 DIAGNOSIS — Z87.440 HISTORY OF UTI: ICD-10-CM

## 2023-10-06 DIAGNOSIS — R05.8 NON-PRODUCTIVE COUGH: ICD-10-CM

## 2023-10-06 DIAGNOSIS — I10 ESSENTIAL (PRIMARY) HYPERTENSION: ICD-10-CM

## 2023-10-06 DIAGNOSIS — I48.0 PAF (PAROXYSMAL ATRIAL FIBRILLATION) (HCC): ICD-10-CM

## 2023-10-06 DIAGNOSIS — E78.5 HYPERLIPIDEMIA WITH TARGET LDL LESS THAN 70: ICD-10-CM

## 2023-10-06 DIAGNOSIS — I48.0 PAF (PAROXYSMAL ATRIAL FIBRILLATION) (HCC): Primary | ICD-10-CM

## 2023-10-06 DIAGNOSIS — B96.89 ACUTE BACTERIAL SINUSITIS: ICD-10-CM

## 2023-10-06 LAB
ALBUMIN SERPL-MCNC: 3.6 G/DL (ref 3.5–5)
ALBUMIN/GLOB SERPL: 1.1 (ref 1.1–2.2)
ALP SERPL-CCNC: 76 U/L (ref 45–117)
ALT SERPL-CCNC: 21 U/L (ref 12–78)
ANION GAP SERPL CALC-SCNC: 3 MMOL/L (ref 5–15)
AST SERPL-CCNC: 23 U/L (ref 15–37)
BILIRUB SERPL-MCNC: 0.6 MG/DL (ref 0.2–1)
BUN SERPL-MCNC: 15 MG/DL (ref 6–20)
BUN/CREAT SERPL: 17 (ref 12–20)
CALCIUM SERPL-MCNC: 9.2 MG/DL (ref 8.5–10.1)
CHLORIDE SERPL-SCNC: 107 MMOL/L (ref 97–108)
CO2 SERPL-SCNC: 30 MMOL/L (ref 21–32)
CREAT SERPL-MCNC: 0.88 MG/DL (ref 0.7–1.3)
EST. AVERAGE GLUCOSE BLD GHB EST-MCNC: 111 MG/DL
GLOBULIN SER CALC-MCNC: 3.3 G/DL (ref 2–4)
GLUCOSE SERPL-MCNC: 76 MG/DL (ref 65–100)
HBA1C MFR BLD: 5.5 % (ref 4–5.6)
POTASSIUM SERPL-SCNC: 4.8 MMOL/L (ref 3.5–5.1)
PROT SERPL-MCNC: 6.9 G/DL (ref 6.4–8.2)
SODIUM SERPL-SCNC: 140 MMOL/L (ref 136–145)
TSH SERPL DL<=0.05 MIU/L-ACNC: 2.08 UIU/ML (ref 0.36–3.74)

## 2023-10-06 RX ORDER — AMOXICILLIN AND CLAVULANATE POTASSIUM 875; 125 MG/1; MG/1
1 TABLET, FILM COATED ORAL 2 TIMES DAILY
Qty: 14 TABLET | Refills: 0 | Status: SHIPPED | OUTPATIENT
Start: 2023-10-06 | End: 2023-10-13

## 2023-10-06 RX ORDER — BENZONATATE 100 MG/1
100 CAPSULE ORAL 3 TIMES DAILY PRN
Qty: 21 CAPSULE | Refills: 0 | Status: SHIPPED | OUTPATIENT
Start: 2023-10-06

## 2023-10-06 SDOH — ECONOMIC STABILITY: FOOD INSECURITY: WITHIN THE PAST 12 MONTHS, THE FOOD YOU BOUGHT JUST DIDN'T LAST AND YOU DIDN'T HAVE MONEY TO GET MORE.: PATIENT DECLINED

## 2023-10-06 SDOH — ECONOMIC STABILITY: FOOD INSECURITY: WITHIN THE PAST 12 MONTHS, YOU WORRIED THAT YOUR FOOD WOULD RUN OUT BEFORE YOU GOT MONEY TO BUY MORE.: PATIENT DECLINED

## 2023-10-06 SDOH — ECONOMIC STABILITY: INCOME INSECURITY: HOW HARD IS IT FOR YOU TO PAY FOR THE VERY BASICS LIKE FOOD, HOUSING, MEDICAL CARE, AND HEATING?: PATIENT DECLINED

## 2023-10-06 ASSESSMENT — ENCOUNTER SYMPTOMS
VOICE CHANGE: 0
TROUBLE SWALLOWING: 0
FACIAL SWELLING: 0
SHORTNESS OF BREATH: 0
SINUS PAIN: 1
COUGH: 1
SINUS PRESSURE: 1
CHEST TIGHTNESS: 0
RHINORRHEA: 1
SORE THROAT: 0
WHEEZING: 0

## 2023-10-06 ASSESSMENT — PATIENT HEALTH QUESTIONNAIRE - PHQ9
2. FEELING DOWN, DEPRESSED OR HOPELESS: 0
1. LITTLE INTEREST OR PLEASURE IN DOING THINGS: 0
SUM OF ALL RESPONSES TO PHQ QUESTIONS 1-9: 0
SUM OF ALL RESPONSES TO PHQ9 QUESTIONS 1 & 2: 0

## 2023-10-06 NOTE — PROGRESS NOTES
[facility]   [address] [place] [address].[facility] Shanell Rivera [address] Arnie Tolbert  P: [phone]  F: [phone]    SUBJECTIVE     HPI:     Mervat Gabriel is a 75 y.o. male who is seen in the clinic for   Chief Complaint   Patient presents with    Alopecia     Pt states that he noticed in the pass couple of weeks that his hair is starting to shed hair more than usual-     Nasal Congestion     Ongoing for a couple of days- pt states that he has a sight cough and will sneeze sometimes-       The patient presents to the office today for the management of multiple complaints. Endorses worsening Alopecia. Would like to check TSH/Testosterone. Endorses worsening sinus pressure, sinus congestion, and rhinorrhea w/ thick-green mucous. Was recently with grandkids. Has not been tested for Covid-19. Of note, was rx'd Cipro for UTI in September. On 7/26, saw Dr. Fabien Vega (Cardiology). Was told to continue with current treatment regimen for PAF, HTN, CAD s/p CABG x5 and HLD, and Myalgia secondary to statin. Follow up is scheduled for 5/3/24. Would like labs drawn.      Patient Active Problem List   Diagnosis    Cataract    Urinary bladder papilloma    Low back pain    Carotid artery disease without cerebral infarction (HCC)    Hypertension    IGT (impaired glucose tolerance)    Hyperlipidemia with target LDL less than 70    CAD (coronary artery disease)    Benign bladder papilloma    Keratitis    Vitamin D deficiency    ED (erectile dysfunction)    Advanced directives, counseling/discussion    Uveitis of right eye        Past Medical History:   Diagnosis Date    Benign bladder papilloma     CAD (coronary artery disease) 10/7/2009    s/p CABG x 5, radial artery and internal mammary, nl stress test 3/08    Carotid artery plaque     mild on life line screening 6/11    Cataract     TIAGO, BEING WATCHED CURRENTLY    Diverticulosis     ED (erectile dysfunction) 10/7/2009    GERD

## 2023-10-13 LAB
TESTOST FREE SERPL-MCNC: 3.5 PG/ML (ref 6.6–18.1)
TESTOST SERPL-MCNC: 313 NG/DL (ref 264–916)

## 2023-10-16 ENCOUNTER — OFFICE VISIT (OUTPATIENT)
Dept: PRIMARY CARE CLINIC | Facility: CLINIC | Age: 73
End: 2023-10-16
Payer: MEDICARE

## 2023-10-16 ENCOUNTER — HOSPITAL ENCOUNTER (OUTPATIENT)
Facility: HOSPITAL | Age: 73
Discharge: HOME | End: 2023-10-19
Payer: MEDICARE

## 2023-10-16 VITALS
WEIGHT: 168 LBS | DIASTOLIC BLOOD PRESSURE: 78 MMHG | TEMPERATURE: 97.5 F | RESPIRATION RATE: 16 BRPM | HEART RATE: 70 BPM | BODY MASS INDEX: 24.88 KG/M2 | OXYGEN SATURATION: 98 % | SYSTOLIC BLOOD PRESSURE: 129 MMHG | HEIGHT: 69 IN

## 2023-10-16 DIAGNOSIS — J98.8 BACTERIAL RESPIRATORY INFECTION: ICD-10-CM

## 2023-10-16 DIAGNOSIS — B96.89 BACTERIAL RESPIRATORY INFECTION: ICD-10-CM

## 2023-10-16 DIAGNOSIS — L65.9 ALOPECIA: ICD-10-CM

## 2023-10-16 DIAGNOSIS — R06.02 SHORTNESS OF BREATH: ICD-10-CM

## 2023-10-16 DIAGNOSIS — R06.2 WHEEZING: ICD-10-CM

## 2023-10-16 DIAGNOSIS — R79.89 LOW TESTOSTERONE IN MALE: Primary | ICD-10-CM

## 2023-10-16 DIAGNOSIS — R05.8 PRODUCTIVE COUGH: ICD-10-CM

## 2023-10-16 PROCEDURE — 1036F TOBACCO NON-USER: CPT | Performed by: NURSE PRACTITIONER

## 2023-10-16 PROCEDURE — 99214 OFFICE O/P EST MOD 30 MIN: CPT | Performed by: NURSE PRACTITIONER

## 2023-10-16 PROCEDURE — 3017F COLORECTAL CA SCREEN DOC REV: CPT | Performed by: NURSE PRACTITIONER

## 2023-10-16 PROCEDURE — 3074F SYST BP LT 130 MM HG: CPT | Performed by: NURSE PRACTITIONER

## 2023-10-16 PROCEDURE — G8420 CALC BMI NORM PARAMETERS: HCPCS | Performed by: NURSE PRACTITIONER

## 2023-10-16 PROCEDURE — G8484 FLU IMMUNIZE NO ADMIN: HCPCS | Performed by: NURSE PRACTITIONER

## 2023-10-16 PROCEDURE — G8427 DOCREV CUR MEDS BY ELIG CLIN: HCPCS | Performed by: NURSE PRACTITIONER

## 2023-10-16 PROCEDURE — 71046 X-RAY EXAM CHEST 2 VIEWS: CPT

## 2023-10-16 PROCEDURE — 3078F DIAST BP <80 MM HG: CPT | Performed by: NURSE PRACTITIONER

## 2023-10-16 PROCEDURE — 1123F ACP DISCUSS/DSCN MKR DOCD: CPT | Performed by: NURSE PRACTITIONER

## 2023-10-16 RX ORDER — BENZONATATE 100 MG/1
100 CAPSULE ORAL 3 TIMES DAILY PRN
Qty: 30 CAPSULE | Refills: 0 | Status: SHIPPED | OUTPATIENT
Start: 2023-10-16 | End: 2023-10-16

## 2023-10-16 RX ORDER — VITAMIN B COMPLEX
1 CAPSULE ORAL DAILY
COMMUNITY

## 2023-10-16 RX ORDER — DOXYCYCLINE HYCLATE 100 MG
100 TABLET ORAL 2 TIMES DAILY
Qty: 14 TABLET | Refills: 0 | Status: SHIPPED | OUTPATIENT
Start: 2023-10-16 | End: 2023-10-23

## 2023-10-16 RX ORDER — METHYLPREDNISOLONE 4 MG/1
TABLET ORAL
Qty: 1 KIT | Refills: 0 | Status: SHIPPED | OUTPATIENT
Start: 2023-10-16 | End: 2023-10-22

## 2023-10-16 RX ORDER — BENZONATATE 100 MG/1
100 CAPSULE ORAL 3 TIMES DAILY PRN
Qty: 30 CAPSULE | Refills: 0 | Status: SHIPPED | OUTPATIENT
Start: 2023-10-16

## 2023-10-16 RX ORDER — DOXYCYCLINE HYCLATE 100 MG
100 TABLET ORAL 2 TIMES DAILY
Qty: 14 TABLET | Refills: 0 | Status: SHIPPED | OUTPATIENT
Start: 2023-10-16 | End: 2023-10-16

## 2023-10-16 RX ORDER — METHYLPREDNISOLONE 4 MG/1
TABLET ORAL
Qty: 1 KIT | Refills: 0 | Status: SHIPPED | OUTPATIENT
Start: 2023-10-16 | End: 2023-10-16

## 2023-10-16 RX ORDER — ALBUTEROL SULFATE 90 UG/1
2 AEROSOL, METERED RESPIRATORY (INHALATION) 4 TIMES DAILY PRN
Qty: 18 G | Refills: 0 | Status: SHIPPED | OUTPATIENT
Start: 2023-10-16

## 2023-10-16 RX ORDER — ALBUTEROL SULFATE 90 UG/1
2 AEROSOL, METERED RESPIRATORY (INHALATION) 4 TIMES DAILY PRN
Qty: 18 G | Refills: 0 | Status: SHIPPED | OUTPATIENT
Start: 2023-10-16 | End: 2023-10-16

## 2023-10-16 SDOH — ECONOMIC STABILITY: FOOD INSECURITY: WITHIN THE PAST 12 MONTHS, YOU WORRIED THAT YOUR FOOD WOULD RUN OUT BEFORE YOU GOT MONEY TO BUY MORE.: PATIENT DECLINED

## 2023-10-16 SDOH — ECONOMIC STABILITY: FOOD INSECURITY: WITHIN THE PAST 12 MONTHS, THE FOOD YOU BOUGHT JUST DIDN'T LAST AND YOU DIDN'T HAVE MONEY TO GET MORE.: PATIENT DECLINED

## 2023-10-16 SDOH — ECONOMIC STABILITY: INCOME INSECURITY: HOW HARD IS IT FOR YOU TO PAY FOR THE VERY BASICS LIKE FOOD, HOUSING, MEDICAL CARE, AND HEATING?: PATIENT DECLINED

## 2023-10-16 ASSESSMENT — ENCOUNTER SYMPTOMS
SINUS PRESSURE: 1
SHORTNESS OF BREATH: 0
TROUBLE SWALLOWING: 0
APNEA: 0
STRIDOR: 0
NAUSEA: 0
VOICE CHANGE: 0
FACIAL SWELLING: 0
RHINORRHEA: 1
SORE THROAT: 0
CHEST TIGHTNESS: 1
CHOKING: 0
COUGH: 1
WHEEZING: 0
SINUS PAIN: 1

## 2023-10-16 ASSESSMENT — PATIENT HEALTH QUESTIONNAIRE - PHQ9
1. LITTLE INTEREST OR PLEASURE IN DOING THINGS: 0
SUM OF ALL RESPONSES TO PHQ QUESTIONS 1-9: 0
SUM OF ALL RESPONSES TO PHQ9 QUESTIONS 1 & 2: 0
SUM OF ALL RESPONSES TO PHQ QUESTIONS 1-9: 0
2. FEELING DOWN, DEPRESSED OR HOPELESS: 0
SUM OF ALL RESPONSES TO PHQ QUESTIONS 1-9: 0
SUM OF ALL RESPONSES TO PHQ QUESTIONS 1-9: 0

## 2023-11-02 DIAGNOSIS — R09.81 SINUS CONGESTION: Primary | ICD-10-CM

## 2023-12-07 ENCOUNTER — HOSPITAL ENCOUNTER (OUTPATIENT)
Age: 73
Discharge: HOME | End: 2023-12-07
Payer: MEDICARE

## 2023-12-07 DIAGNOSIS — J32.9 CHRONIC SINUSITIS, UNSPECIFIED LOCATION: ICD-10-CM

## 2023-12-07 PROCEDURE — 70486 CT MAXILLOFACIAL W/O DYE: CPT

## 2024-01-22 ENCOUNTER — OFFICE VISIT (OUTPATIENT)
Dept: PRIMARY CARE CLINIC | Facility: CLINIC | Age: 74
End: 2024-01-22
Payer: MEDICARE

## 2024-01-22 VITALS
SYSTOLIC BLOOD PRESSURE: 123 MMHG | WEIGHT: 168 LBS | OXYGEN SATURATION: 97 % | TEMPERATURE: 97.9 F | HEART RATE: 74 BPM | DIASTOLIC BLOOD PRESSURE: 83 MMHG | HEIGHT: 69 IN | RESPIRATION RATE: 16 BRPM | BODY MASS INDEX: 24.88 KG/M2

## 2024-01-22 DIAGNOSIS — R05.3 CHRONIC COUGH: Primary | ICD-10-CM

## 2024-01-22 DIAGNOSIS — R05.3 CHRONIC COUGH: ICD-10-CM

## 2024-01-22 LAB
BASOPHILS # BLD: 0.1 K/UL (ref 0–0.1)
BASOPHILS NFR BLD: 1 % (ref 0–1)
DIFFERENTIAL METHOD BLD: NORMAL
EOSINOPHIL # BLD: 0.2 K/UL (ref 0–0.4)
EOSINOPHIL NFR BLD: 2 % (ref 0–7)
ERYTHROCYTE [DISTWIDTH] IN BLOOD BY AUTOMATED COUNT: 13.8 % (ref 11.5–14.5)
HCT VFR BLD AUTO: 47.8 % (ref 36.6–50.3)
HGB BLD-MCNC: 15.4 G/DL (ref 12.1–17)
IMM GRANULOCYTES # BLD AUTO: 0 K/UL (ref 0–0.04)
IMM GRANULOCYTES NFR BLD AUTO: 0 % (ref 0–0.5)
LYMPHOCYTES # BLD: 2 K/UL (ref 0.8–3.5)
LYMPHOCYTES NFR BLD: 26 % (ref 12–49)
MCH RBC QN AUTO: 29.7 PG (ref 26–34)
MCHC RBC AUTO-ENTMCNC: 32.2 G/DL (ref 30–36.5)
MCV RBC AUTO: 92.3 FL (ref 80–99)
MONOCYTES # BLD: 1 K/UL (ref 0–1)
MONOCYTES NFR BLD: 13 % (ref 5–13)
NEUTS SEG # BLD: 4.4 K/UL (ref 1.8–8)
NEUTS SEG NFR BLD: 58 % (ref 32–75)
NRBC # BLD: 0 K/UL (ref 0–0.01)
NRBC BLD-RTO: 0 PER 100 WBC
PLATELET # BLD AUTO: 207 K/UL (ref 150–400)
PMV BLD AUTO: 11 FL (ref 8.9–12.9)
RBC # BLD AUTO: 5.18 M/UL (ref 4.1–5.7)
WBC # BLD AUTO: 7.5 K/UL (ref 4.1–11.1)

## 2024-01-22 PROCEDURE — 3017F COLORECTAL CA SCREEN DOC REV: CPT | Performed by: FAMILY MEDICINE

## 2024-01-22 PROCEDURE — 99214 OFFICE O/P EST MOD 30 MIN: CPT | Performed by: FAMILY MEDICINE

## 2024-01-22 PROCEDURE — G8484 FLU IMMUNIZE NO ADMIN: HCPCS | Performed by: FAMILY MEDICINE

## 2024-01-22 PROCEDURE — 1123F ACP DISCUSS/DSCN MKR DOCD: CPT | Performed by: FAMILY MEDICINE

## 2024-01-22 PROCEDURE — G8420 CALC BMI NORM PARAMETERS: HCPCS | Performed by: FAMILY MEDICINE

## 2024-01-22 PROCEDURE — 1036F TOBACCO NON-USER: CPT | Performed by: FAMILY MEDICINE

## 2024-01-22 PROCEDURE — 3079F DIAST BP 80-89 MM HG: CPT | Performed by: FAMILY MEDICINE

## 2024-01-22 PROCEDURE — G8427 DOCREV CUR MEDS BY ELIG CLIN: HCPCS | Performed by: FAMILY MEDICINE

## 2024-01-22 PROCEDURE — 3074F SYST BP LT 130 MM HG: CPT | Performed by: FAMILY MEDICINE

## 2024-01-22 RX ORDER — BENZONATATE 100 MG/1
100 CAPSULE ORAL 3 TIMES DAILY PRN
Qty: 30 CAPSULE | Refills: 0 | Status: SHIPPED | OUTPATIENT
Start: 2024-01-22

## 2024-01-22 ASSESSMENT — PATIENT HEALTH QUESTIONNAIRE - PHQ9
SUM OF ALL RESPONSES TO PHQ QUESTIONS 1-9: 0
2. FEELING DOWN, DEPRESSED OR HOPELESS: 0
SUM OF ALL RESPONSES TO PHQ QUESTIONS 1-9: 0
SUM OF ALL RESPONSES TO PHQ QUESTIONS 1-9: 0
SUM OF ALL RESPONSES TO PHQ9 QUESTIONS 1 & 2: 0
1. LITTLE INTEREST OR PLEASURE IN DOING THINGS: 0
SUM OF ALL RESPONSES TO PHQ QUESTIONS 1-9: 0

## 2024-01-22 ASSESSMENT — ENCOUNTER SYMPTOMS
COUGH: 1
WHEEZING: 0

## 2024-01-22 NOTE — PROGRESS NOTES
\"Have you been to the ER, urgent care clinic since your last visit?  Hospitalized since your last visit?\"    NO    “Have you seen or consulted any other health care providers outside of [facility] since your last visit?”    YES - When: approximately 2 months ago.  Where and Why: ENT.

## 2024-01-22 NOTE — PROGRESS NOTES
HPI     Chief Complaint   Patient presents with    Cough     Patient has had a cough for awhile, has pneumonia last June and was in the hospital for a week.      He is a 73 y.o. male who presents for cough.    Had sudden hearing loss in Spring 2023. Was put on high dose steroids by Dr. Watters (ENT). Developed PNA/ sepsis and was hospitalized during the Summer. Followed up with Bryan Martinez NP in October who was concerned about congestion and cough. Started on Amox then later switched to Doxy after no improvement. Was still having cough and had CXR that showed no PNA. Congestion didn't improve. Went to [place]. Followed up with ENT shortly after. Was started on Mucinex, Nasonex, nasal rinses, Afrin. Had a culture that was negative. Repeated Prednisone (at lower dose) and had relief of symptoms. Started having congestion/ cough again.Had CT sinuses that showed extensive pansinusitis who had recommended surgery. He is seeking 2nd opinion with Dr. العراقي 2/14/24.     States cough is now getting deeper this past weekend from Friday- Saturday. Had laryngitis over the weekend. States he feels the cough is coming from his chest and is productive of yellow sputum. Has not taken abx since Fall. Has tried Amox, Doxy for his sinus infections.He has not seen Pulm or Allergy.     Former smoker. Quit in 1980. Does not vape or use marijuana. No one in home smokes. Has Tessalon at home which does help. Would like refill.     Review of Systems   Constitutional:  Negative for chills and fever.   Respiratory:  Positive for cough. Negative for wheezing.       Reviewed PmHx, RxHx, FmHx, SocHx, AllgHx and updated and dated in the chart.    Physical Exam:  /83   Pulse 74   Temp 97.9 °F (36.6 °C) (Oral)   Resp 16   Ht 1.753 m (5' 9\")   Wt 76.2 kg (168 lb)   SpO2 97%   BMI 24.81 kg/m²   Physical Exam  Vitals and nursing note reviewed.   Constitutional:       General: He is not in acute distress.     Appearance: Normal

## 2024-01-23 LAB
ALBUMIN SERPL-MCNC: 3.9 G/DL (ref 3.5–5)
ALBUMIN/GLOB SERPL: 1.3 (ref 1.1–2.2)
ALP SERPL-CCNC: 82 U/L (ref 45–117)
ALT SERPL-CCNC: 20 U/L (ref 12–78)
ANION GAP SERPL CALC-SCNC: 2 MMOL/L (ref 5–15)
AST SERPL-CCNC: 21 U/L (ref 15–37)
BILIRUB SERPL-MCNC: 0.5 MG/DL (ref 0.2–1)
BUN SERPL-MCNC: 16 MG/DL (ref 6–20)
BUN/CREAT SERPL: 17 (ref 12–20)
CALCIUM SERPL-MCNC: 9 MG/DL (ref 8.5–10.1)
CHLORIDE SERPL-SCNC: 108 MMOL/L (ref 97–108)
CO2 SERPL-SCNC: 29 MMOL/L (ref 21–32)
CREAT SERPL-MCNC: 0.94 MG/DL (ref 0.7–1.3)
GLOBULIN SER CALC-MCNC: 3 G/DL (ref 2–4)
GLUCOSE SERPL-MCNC: 99 MG/DL (ref 65–100)
POTASSIUM SERPL-SCNC: 4.5 MMOL/L (ref 3.5–5.1)
PROT SERPL-MCNC: 6.9 G/DL (ref 6.4–8.2)
SODIUM SERPL-SCNC: 139 MMOL/L (ref 136–145)

## 2024-01-30 ENCOUNTER — HOSPITAL ENCOUNTER (OUTPATIENT)
Age: 74
Discharge: HOME | End: 2024-02-02
Payer: MEDICARE

## 2024-01-30 DIAGNOSIS — R05.3 CHRONIC COUGH: ICD-10-CM

## 2024-01-30 PROCEDURE — 6360000004 HC RX CONTRAST MEDICATION: Performed by: RADIOLOGY

## 2024-01-30 PROCEDURE — 71260 CT THORAX DX C+: CPT

## 2024-01-30 RX ADMIN — IOPAMIDOL 100 ML: 755 INJECTION, SOLUTION INTRAVENOUS at 10:02

## 2024-03-12 ENCOUNTER — HOSPITAL ENCOUNTER (OUTPATIENT)
Age: 74
Discharge: HOME | End: 2024-03-15
Payer: MEDICARE

## 2024-03-12 DIAGNOSIS — J32.0 CHRONIC MAXILLARY SINUSITIS: ICD-10-CM

## 2024-03-12 PROCEDURE — 70486 CT MAXILLOFACIAL W/O DYE: CPT

## 2024-03-27 DIAGNOSIS — I77.9 CAROTID ARTERY DISEASE WITHOUT CEREBRAL INFARCTION (HCC): ICD-10-CM

## 2024-03-27 DIAGNOSIS — E78.5 HYPERLIPIDEMIA WITH TARGET LDL LESS THAN 70: ICD-10-CM

## 2024-03-27 DIAGNOSIS — I48.0 PAF (PAROXYSMAL ATRIAL FIBRILLATION) (HCC): ICD-10-CM

## 2024-03-27 DIAGNOSIS — I10 ESSENTIAL (PRIMARY) HYPERTENSION: ICD-10-CM

## 2024-03-27 RX ORDER — ATORVASTATIN CALCIUM 80 MG/1
80 TABLET, FILM COATED ORAL NIGHTLY
Qty: 90 TABLET | Refills: 1 | OUTPATIENT
Start: 2024-03-27

## 2024-03-27 RX ORDER — ATENOLOL 50 MG/1
50 TABLET ORAL NIGHTLY
Qty: 90 TABLET | Refills: 1 | OUTPATIENT
Start: 2024-03-27

## 2024-03-27 RX ORDER — ATENOLOL 50 MG/1
50 TABLET ORAL
Qty: 90 TABLET | Refills: 0 | Status: SHIPPED | OUTPATIENT
Start: 2024-03-27

## 2024-03-27 RX ORDER — ATORVASTATIN CALCIUM 80 MG/1
80 TABLET, FILM COATED ORAL NIGHTLY
Qty: 90 TABLET | Refills: 0 | Status: SHIPPED | OUTPATIENT
Start: 2024-03-27

## 2024-04-24 SDOH — HEALTH STABILITY: PHYSICAL HEALTH: ON AVERAGE, HOW MANY DAYS PER WEEK DO YOU ENGAGE IN MODERATE TO STRENUOUS EXERCISE (LIKE A BRISK WALK)?: 2 DAYS

## 2024-04-24 SDOH — HEALTH STABILITY: PHYSICAL HEALTH: ON AVERAGE, HOW MANY MINUTES DO YOU ENGAGE IN EXERCISE AT THIS LEVEL?: 60 MIN

## 2024-04-25 ENCOUNTER — OFFICE VISIT (OUTPATIENT)
Dept: PRIMARY CARE CLINIC | Facility: CLINIC | Age: 74
End: 2024-04-25

## 2024-04-25 VITALS
SYSTOLIC BLOOD PRESSURE: 136 MMHG | RESPIRATION RATE: 14 BRPM | TEMPERATURE: 98.7 F | WEIGHT: 167.8 LBS | HEART RATE: 74 BPM | DIASTOLIC BLOOD PRESSURE: 86 MMHG | OXYGEN SATURATION: 98 % | HEIGHT: 69 IN | BODY MASS INDEX: 24.85 KG/M2

## 2024-04-25 DIAGNOSIS — T75.3XXA MOTION SICKNESS, INITIAL ENCOUNTER: ICD-10-CM

## 2024-04-25 DIAGNOSIS — R73.03 PREDIABETES: ICD-10-CM

## 2024-04-25 DIAGNOSIS — I25.810 CORONARY ARTERY DISEASE INVOLVING CORONARY BYPASS GRAFT OF NATIVE HEART WITHOUT ANGINA PECTORIS: ICD-10-CM

## 2024-04-25 DIAGNOSIS — I10 ESSENTIAL (PRIMARY) HYPERTENSION: Primary | ICD-10-CM

## 2024-04-25 RX ORDER — SCOLOPAMINE TRANSDERMAL SYSTEM 1 MG/1
1 PATCH, EXTENDED RELEASE TRANSDERMAL
Qty: 7 PATCH | Refills: 0 | Status: SHIPPED | OUTPATIENT
Start: 2024-04-25

## 2024-04-25 SDOH — ECONOMIC STABILITY: FOOD INSECURITY: WITHIN THE PAST 12 MONTHS, THE FOOD YOU BOUGHT JUST DIDN'T LAST AND YOU DIDN'T HAVE MONEY TO GET MORE.: NEVER TRUE

## 2024-04-25 SDOH — ECONOMIC STABILITY: INCOME INSECURITY: HOW HARD IS IT FOR YOU TO PAY FOR THE VERY BASICS LIKE FOOD, HOUSING, MEDICAL CARE, AND HEATING?: NOT HARD AT ALL

## 2024-04-25 SDOH — ECONOMIC STABILITY: FOOD INSECURITY: WITHIN THE PAST 12 MONTHS, YOU WORRIED THAT YOUR FOOD WOULD RUN OUT BEFORE YOU GOT MONEY TO BUY MORE.: NEVER TRUE

## 2024-04-25 SDOH — ECONOMIC STABILITY: HOUSING INSECURITY
IN THE LAST 12 MONTHS, WAS THERE A TIME WHEN YOU DID NOT HAVE A STEADY PLACE TO SLEEP OR SLEPT IN A SHELTER (INCLUDING NOW)?: NO

## 2024-04-25 ASSESSMENT — PATIENT HEALTH QUESTIONNAIRE - PHQ9
1. LITTLE INTEREST OR PLEASURE IN DOING THINGS: NOT AT ALL
SUM OF ALL RESPONSES TO PHQ QUESTIONS 1-9: 0
SUM OF ALL RESPONSES TO PHQ9 QUESTIONS 1 & 2: 0
2. FEELING DOWN, DEPRESSED OR HOPELESS: NOT AT ALL
SUM OF ALL RESPONSES TO PHQ QUESTIONS 1-9: 0

## 2024-04-25 NOTE — PROGRESS NOTES
HPI     Chief Complaint   Patient presents with   • Established New Doctor     Was former patient of Juan, needed to estab care with new provider here at Practice.  BP check     He is a 73 y.o. male who presents to establish care.    Establishing Care    Pmhx :   CAD s/p CABG x 5,  ED,   HTN, HLD, IGT    Follows with cardiology, dr Beavers.    Hx microscopic hematuria. Following with urology. Had a benign tumor removed. Gets routine cystoscopy.     Chronic sinus issues. Following with ENT.     He stays pretty active but does not exercise regularly. Diet is generally healthy, but admits to indulging in sweets.     Planning to go on a 2 week cruise and would like something to use prn for nausea.       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

## 2024-04-25 NOTE — PROGRESS NOTES
/86 (Site: Right Upper Arm, Position: Sitting, Cuff Size: Medium Adult)   Pulse 74   Temp 98.7 °F (37.1 °C) (Oral)   Resp 14   Ht 1.753 m (5' 9\")   Wt 76.1 kg (167 lb 12.8 oz)   SpO2 98%   BMI 24.78 kg/m²      \"Have you been to the ER, urgent care clinic since your last visit?  Hospitalized since your last visit?\"    NO    “Have you seen or consulted any other health care providers outside of [facility] since your last visit?”    YES - When: approximately 6 months ago.  Where and Why: no, Dr. Janey Watters and his pulmonoligist.          Click Here for Release of Records Request

## 2024-04-26 ENCOUNTER — HOSPITAL ENCOUNTER (OUTPATIENT)
Facility: HOSPITAL | Age: 74
Discharge: HOME | End: 2024-04-29

## 2024-04-26 LAB
ANION GAP SERPL CALC-SCNC: 3 MMOL/L (ref 5–15)
BUN SERPL-MCNC: 16 MG/DL (ref 6–20)
BUN/CREAT SERPL: 16 (ref 12–20)
CALCIUM SERPL-MCNC: 9.1 MG/DL (ref 8.5–10.1)
CHLORIDE SERPL-SCNC: 108 MMOL/L (ref 97–108)
CHOLEST SERPL-MCNC: 161 MG/DL
CO2 SERPL-SCNC: 31 MMOL/L (ref 21–32)
CREAT SERPL-MCNC: 1.02 MG/DL (ref 0.7–1.3)
EST. AVERAGE GLUCOSE BLD GHB EST-MCNC: 111 MG/DL
GLUCOSE SERPL-MCNC: 99 MG/DL (ref 65–100)
HBA1C MFR BLD: 5.5 % (ref 4–5.6)
HDLC SERPL-MCNC: 61 MG/DL
HDLC SERPL: 2.6 (ref 0–5)
LDLC SERPL CALC-MCNC: 84.2 MG/DL (ref 0–100)
POTASSIUM SERPL-SCNC: 4.6 MMOL/L (ref 3.5–5.1)
SODIUM SERPL-SCNC: 142 MMOL/L (ref 136–145)
TRIGL SERPL-MCNC: 79 MG/DL
VLDLC SERPL CALC-MCNC: 15.8 MG/DL

## 2024-05-03 ENCOUNTER — OFFICE VISIT (OUTPATIENT)
Age: 74
End: 2024-05-03
Payer: MEDICARE

## 2024-05-03 VITALS
BODY MASS INDEX: 24.32 KG/M2 | SYSTOLIC BLOOD PRESSURE: 122 MMHG | HEART RATE: 61 BPM | HEIGHT: 69 IN | OXYGEN SATURATION: 97 % | WEIGHT: 164.2 LBS | DIASTOLIC BLOOD PRESSURE: 80 MMHG

## 2024-05-03 DIAGNOSIS — I10 PRIMARY HYPERTENSION: ICD-10-CM

## 2024-05-03 DIAGNOSIS — I25.10 CORONARY ARTERY DISEASE INVOLVING NATIVE CORONARY ARTERY OF NATIVE HEART WITHOUT ANGINA PECTORIS: Primary | ICD-10-CM

## 2024-05-03 DIAGNOSIS — I48.0 PAROXYSMAL ATRIAL FIBRILLATION (HCC): ICD-10-CM

## 2024-05-03 DIAGNOSIS — R73.09 OTHER ABNORMAL GLUCOSE: ICD-10-CM

## 2024-05-03 DIAGNOSIS — E78.2 MIXED HYPERLIPIDEMIA: ICD-10-CM

## 2024-05-03 PROCEDURE — 3017F COLORECTAL CA SCREEN DOC REV: CPT | Performed by: SPECIALIST

## 2024-05-03 PROCEDURE — G8427 DOCREV CUR MEDS BY ELIG CLIN: HCPCS | Performed by: SPECIALIST

## 2024-05-03 PROCEDURE — 3079F DIAST BP 80-89 MM HG: CPT | Performed by: SPECIALIST

## 2024-05-03 PROCEDURE — 99214 OFFICE O/P EST MOD 30 MIN: CPT | Performed by: SPECIALIST

## 2024-05-03 PROCEDURE — G8420 CALC BMI NORM PARAMETERS: HCPCS | Performed by: SPECIALIST

## 2024-05-03 PROCEDURE — 1123F ACP DISCUSS/DSCN MKR DOCD: CPT | Performed by: SPECIALIST

## 2024-05-03 PROCEDURE — 1036F TOBACCO NON-USER: CPT | Performed by: SPECIALIST

## 2024-05-03 PROCEDURE — 3074F SYST BP LT 130 MM HG: CPT | Performed by: SPECIALIST

## 2024-05-03 ASSESSMENT — PATIENT HEALTH QUESTIONNAIRE - PHQ9
SUM OF ALL RESPONSES TO PHQ QUESTIONS 1-9: 0
SUM OF ALL RESPONSES TO PHQ9 QUESTIONS 1 & 2: 0
2. FEELING DOWN, DEPRESSED OR HOPELESS: NOT AT ALL
SUM OF ALL RESPONSES TO PHQ QUESTIONS 1-9: 0
1. LITTLE INTEREST OR PLEASURE IN DOING THINGS: NOT AT ALL

## 2024-05-03 NOTE — PROGRESS NOTES
HISTORY OF PRESENT ILLNESS  Melecio Overton is a 73 y.o. male     SUMMARY:   Patient Active Problem List   Diagnosis    Cataract    Urinary bladder papilloma    Low back pain    Carotid artery disease without cerebral infarction (HCC)    Hypertension    IGT (impaired glucose tolerance)    Hyperlipidemia with target LDL less than 70    CAD (coronary artery disease)    Benign bladder papilloma    Keratitis    Vitamin D deficiency    ED (erectile dysfunction)    Advanced directives, counseling/discussion    Uveitis of right eye            CARDIOLOGY STUDIES TO DATE:  4/11 normal stress echo   6/11 life line screen , mild bilateral carotid disease   4/13 carotid dopplers 10-49% right and 0-9% left stenoses   4/15 carotid dopplers 10-49% right and 0-9% left stenoses   2/18 normal stress echo   4/19 carotid dopplers, mild bilateral stenosis, no change  12/20 neg AAA screening    5/23 carotid dopplers unchanged    6/23 normal echo  6/23 14d event monitor, rare pac,pvc, no afib    Chief Complaint   Patient presents with    Annual Exam     Coronary artery disease involving native coronary artery of native heart without angina pectoris       HPI :  He is doing well with no cardiac complaints or problems with his medications.  Blood pressure is well-controlled.  He is exercising though not as much as he like.  He has had a bunch of trouble with his sinuses over the last few months and things are finally settling down.  They are looking forward to eye flaking cruise around the [place] starting next week.  Recent lipid profile looked excellent.    CARDIAC ROS:   negative for chest pain, claudication, dyspnea, irregular heart beat, lower extremity edema, orthopnea, paroxysmal nocturnal dyspnea, and syncope    Family History   Problem Relation Age of Onset    No Known Problems Daughter     No Known Problems Daughter     Cataracts Sister     High Cholesterol Sister     Thyroid Disease Sister     Cataracts Father

## 2024-05-30 ENCOUNTER — TELEPHONE (OUTPATIENT)
Dept: PRIMARY CARE CLINIC | Facility: CLINIC | Age: 74
End: 2024-05-30

## 2024-05-30 NOTE — TELEPHONE ENCOUNTER
Appointment Request From: Melecio Overton     With Provider: Mary Alice Chase DO [[facility]]     Preferred Date Range: 5/30/2024 - 5/31/2024     Preferred Times: Any Time     Reason for visit: Request an Appointment     Comments:  congestion/sinusitis/cough for several days. bad pneumonia last year

## 2024-05-31 NOTE — TELEPHONE ENCOUNTER
PT LEFT ED WITH VERBAL D/C INSTRUCTIONS. PT AMBULATED INDEPENDENTLY FROM ED WITH SPOUSE. DANA ALICEA NP MADE AWARE.     Raeann Shell RN  04/09/17 1921     Scheduled 6/3 at 10:30 am per [place]. Patient is aware sinusitis is the only issue we will address.

## 2024-06-03 ENCOUNTER — OFFICE VISIT (OUTPATIENT)
Dept: PRIMARY CARE CLINIC | Facility: CLINIC | Age: 74
End: 2024-06-03
Payer: MEDICARE

## 2024-06-03 VITALS
SYSTOLIC BLOOD PRESSURE: 127 MMHG | WEIGHT: 167.8 LBS | HEART RATE: 100 BPM | BODY MASS INDEX: 24.85 KG/M2 | HEIGHT: 69 IN | TEMPERATURE: 99.3 F | OXYGEN SATURATION: 98 % | RESPIRATION RATE: 14 BRPM | DIASTOLIC BLOOD PRESSURE: 70 MMHG

## 2024-06-03 DIAGNOSIS — J01.11 ACUTE RECURRENT FRONTAL SINUSITIS: Primary | ICD-10-CM

## 2024-06-03 PROCEDURE — 3078F DIAST BP <80 MM HG: CPT | Performed by: FAMILY MEDICINE

## 2024-06-03 PROCEDURE — 3074F SYST BP LT 130 MM HG: CPT | Performed by: FAMILY MEDICINE

## 2024-06-03 PROCEDURE — 3017F COLORECTAL CA SCREEN DOC REV: CPT | Performed by: FAMILY MEDICINE

## 2024-06-03 PROCEDURE — G8427 DOCREV CUR MEDS BY ELIG CLIN: HCPCS | Performed by: FAMILY MEDICINE

## 2024-06-03 PROCEDURE — G8420 CALC BMI NORM PARAMETERS: HCPCS | Performed by: FAMILY MEDICINE

## 2024-06-03 PROCEDURE — 99213 OFFICE O/P EST LOW 20 MIN: CPT | Performed by: FAMILY MEDICINE

## 2024-06-03 PROCEDURE — 1123F ACP DISCUSS/DSCN MKR DOCD: CPT | Performed by: FAMILY MEDICINE

## 2024-06-03 PROCEDURE — 1036F TOBACCO NON-USER: CPT | Performed by: FAMILY MEDICINE

## 2024-06-03 RX ORDER — AMOXICILLIN AND CLAVULANATE POTASSIUM 875; 125 MG/1; MG/1
1 TABLET, FILM COATED ORAL 2 TIMES DAILY
Qty: 14 TABLET | Refills: 0 | Status: SHIPPED | OUTPATIENT
Start: 2024-06-03 | End: 2024-06-10

## 2024-06-03 SDOH — ECONOMIC STABILITY: INCOME INSECURITY: HOW HARD IS IT FOR YOU TO PAY FOR THE VERY BASICS LIKE FOOD, HOUSING, MEDICAL CARE, AND HEATING?: NOT HARD AT ALL

## 2024-06-03 SDOH — ECONOMIC STABILITY: FOOD INSECURITY: WITHIN THE PAST 12 MONTHS, YOU WORRIED THAT YOUR FOOD WOULD RUN OUT BEFORE YOU GOT MONEY TO BUY MORE.: NEVER TRUE

## 2024-06-03 SDOH — ECONOMIC STABILITY: FOOD INSECURITY: WITHIN THE PAST 12 MONTHS, THE FOOD YOU BOUGHT JUST DIDN'T LAST AND YOU DIDN'T HAVE MONEY TO GET MORE.: NEVER TRUE

## 2024-06-03 ASSESSMENT — PATIENT HEALTH QUESTIONNAIRE - PHQ9
1. LITTLE INTEREST OR PLEASURE IN DOING THINGS: NOT AT ALL
2. FEELING DOWN, DEPRESSED OR HOPELESS: NOT AT ALL
SUM OF ALL RESPONSES TO PHQ QUESTIONS 1-9: 0
SUM OF ALL RESPONSES TO PHQ9 QUESTIONS 1 & 2: 0
SUM OF ALL RESPONSES TO PHQ QUESTIONS 1-9: 0

## 2024-06-03 NOTE — PROGRESS NOTES
/70 (Site: Left Upper Arm, Position: Sitting, Cuff Size: Medium Adult)   Pulse 100   Temp 99.3 °F (37.4 °C) (Oral)   Resp 14   Ht 1.753 m (5' 9\")   Wt 76.1 kg (167 lb 12.8 oz)   SpO2 98%   BMI 24.78 kg/m²      \"Have you been to the ER, urgent care clinic since your last visit?  Hospitalized since your last visit?\"    NO    “Have you seen or consulted any other health care providers outside of [facility] since your last visit?”    NO          Click Here for Release of Records Request

## 2024-06-03 NOTE — PROGRESS NOTES
HPI     Chief Complaint   Patient presents with    Sinusitis     Patient tested negative for Covid on Saturday  Symptoms present today, congestion in nose, a little of coughing, no fever, headache.  Mucous is thick and greenish yellow.  Sinuses ache a lot, forehead and under the eyes, area of most discomfort, patient had pneumonia last summer, and he's concerned      Patient's PCP is Dr. Chase.   History of Present Illness  The patient is a 73-year-old male who is here for sinus symptoms.    The patient has been experiencing sinus symptoms since the fall season. He underwent a CT scan in 12/2023, which showed extensive pansinusitis. Subsequently, he consulted with Dr. العراقي, who conducted a chest x-ray and subsequently referred him to a pulmonologist. His breathing test results were normal, with no allergies detected. A second CT scan revealed some improvement, however, Dr. العراقي deemed it unnecessary for surgical intervention. Dr. العراقي prescribed another course of prednisone and an antibiotic, to be administered for a few weeks. The patient was advised to continue nasal rinses and nasal spray. Following the completion of the course in 04/2024, the patient reported feeling well. He was advised to continue nasal rinses until the end of the month and continue the nasal spray for another month. The patient recently returned from a cruise to the [facility] and [facility], during which his wife contracted a cold. Upon returning home, the patient began experiencing a runny nose, which he managed with Mucinex. However, his symptoms worsened on Wednesday or Thursday, prompting him to schedule an appointment. He expresses concern about the potential development of pneumonia if untreated, a condition he previously suffered from. He suspects a flare-up of sinusitis and requires another course of antibiotics. Dr. العراقي last prescribed amoxicillin-clavulanate 125 mg, which he started on 04/02/2024. He denies experiencing

## 2024-06-13 DIAGNOSIS — I77.9 CAROTID ARTERY DISEASE WITHOUT CEREBRAL INFARCTION (HCC): ICD-10-CM

## 2024-06-13 DIAGNOSIS — E78.5 HYPERLIPIDEMIA WITH TARGET LDL LESS THAN 70: ICD-10-CM

## 2024-06-13 DIAGNOSIS — I10 ESSENTIAL (PRIMARY) HYPERTENSION: ICD-10-CM

## 2024-06-13 DIAGNOSIS — I48.0 PAF (PAROXYSMAL ATRIAL FIBRILLATION) (HCC): ICD-10-CM

## 2024-06-14 DIAGNOSIS — I10 ESSENTIAL (PRIMARY) HYPERTENSION: ICD-10-CM

## 2024-06-14 DIAGNOSIS — I48.0 PAF (PAROXYSMAL ATRIAL FIBRILLATION) (HCC): ICD-10-CM

## 2024-06-14 RX ORDER — ATORVASTATIN CALCIUM 80 MG/1
80 TABLET, FILM COATED ORAL NIGHTLY
Qty: 90 TABLET | Refills: 1 | Status: SHIPPED | OUTPATIENT
Start: 2024-06-14

## 2024-06-14 RX ORDER — ATENOLOL 50 MG/1
50 TABLET ORAL
Qty: 90 TABLET | Refills: 1 | Status: SHIPPED | OUTPATIENT
Start: 2024-06-14

## 2024-06-14 RX ORDER — ATENOLOL 50 MG/1
50 TABLET ORAL NIGHTLY
Qty: 90 TABLET | Refills: 1 | Status: SHIPPED | OUTPATIENT
Start: 2024-06-14

## 2024-06-14 RX ORDER — ATENOLOL 50 MG/1
50 TABLET ORAL
Qty: 90 TABLET | Refills: 0 | OUTPATIENT
Start: 2024-06-14

## 2024-08-30 ENCOUNTER — TELEPHONE (OUTPATIENT)
Dept: PRIMARY CARE CLINIC | Facility: CLINIC | Age: 74
End: 2024-08-30

## 2024-08-30 NOTE — TELEPHONE ENCOUNTER
----- Message from Blue D sent at 8/30/2024 11:18 AM EDT -----  Regarding: E[facility] Appointment Request  E[facility] Appointment Request    Patient needs appointment for ECC Appointment Type: New to Provider.    Patient Requested Dates(s): Any date as soon as possible.   Patient Requested Time: Anytime as soon as possible.   Provider Name: Marianne Low    Reason for Appointment Request: Established Patient - No appointments available during search  --------------------------------------------------------------------------------------------------------------------------    Relationship to Patient: Self     Call Back Information: OK to leave message on voicemail  Preferred Call Back Number: Phone [phone]

## 2024-09-03 ENCOUNTER — TELEPHONE (OUTPATIENT)
Dept: PRIMARY CARE CLINIC | Facility: CLINIC | Age: 74
End: 2024-09-03

## 2024-09-03 NOTE — TELEPHONE ENCOUNTER
Called and left a voicemail message to schedule a new to provider appointment per patients request.

## 2024-09-30 ENCOUNTER — TELEPHONE (OUTPATIENT)
Dept: PRIMARY CARE CLINIC | Facility: CLINIC | Age: 74
End: 2024-09-30

## 2024-09-30 NOTE — TELEPHONE ENCOUNTER
I am feeling a bulge in my upper abdomen, just below the sternum.  Sometimes it is more prominent or noticeable than others.  It measures about 4 cms.  There is no pain or discomfort, but I have been experiencing some digestive issues.  I know I have an Annual Wellness visit in 2 weeks.  But don't know how serious this is and if I should wait until then to be seen.  Please advise.   Thanks, Melecio

## 2024-10-12 SDOH — HEALTH STABILITY: PHYSICAL HEALTH: ON AVERAGE, HOW MANY DAYS PER WEEK DO YOU ENGAGE IN MODERATE TO STRENUOUS EXERCISE (LIKE A BRISK WALK)?: 2 DAYS

## 2024-10-12 SDOH — HEALTH STABILITY: PHYSICAL HEALTH: ON AVERAGE, HOW MANY MINUTES DO YOU ENGAGE IN EXERCISE AT THIS LEVEL?: 60 MIN

## 2024-10-15 ENCOUNTER — OFFICE VISIT (OUTPATIENT)
Age: 74
End: 2024-10-15
Payer: MEDICARE

## 2024-10-15 ENCOUNTER — TELEPHONE (OUTPATIENT)
Age: 74
End: 2024-10-15

## 2024-10-15 VITALS
DIASTOLIC BLOOD PRESSURE: 73 MMHG | WEIGHT: 169 LBS | HEIGHT: 69 IN | BODY MASS INDEX: 25.03 KG/M2 | OXYGEN SATURATION: 98 % | HEART RATE: 65 BPM | RESPIRATION RATE: 16 BRPM | SYSTOLIC BLOOD PRESSURE: 123 MMHG | TEMPERATURE: 99 F

## 2024-10-15 DIAGNOSIS — K43.9 VENTRAL HERNIA WITHOUT OBSTRUCTION OR GANGRENE: ICD-10-CM

## 2024-10-15 DIAGNOSIS — I77.9 CAROTID ARTERY DISEASE WITHOUT CEREBRAL INFARCTION (HCC): ICD-10-CM

## 2024-10-15 DIAGNOSIS — I10 PRIMARY HYPERTENSION: ICD-10-CM

## 2024-10-15 DIAGNOSIS — Z76.89 ENCOUNTER TO ESTABLISH CARE: ICD-10-CM

## 2024-10-15 DIAGNOSIS — I25.10 CORONARY ARTERY DISEASE INVOLVING NATIVE CORONARY ARTERY OF NATIVE HEART WITHOUT ANGINA PECTORIS: Primary | ICD-10-CM

## 2024-10-15 DIAGNOSIS — E78.5 HYPERLIPIDEMIA WITH TARGET LDL LESS THAN 70: ICD-10-CM

## 2024-10-15 DIAGNOSIS — E55.9 VITAMIN D DEFICIENCY: ICD-10-CM

## 2024-10-15 DIAGNOSIS — Z00.00 WELL ADULT EXAM: ICD-10-CM

## 2024-10-15 PROCEDURE — 99397 PER PM REEVAL EST PAT 65+ YR: CPT | Performed by: STUDENT IN AN ORGANIZED HEALTH CARE EDUCATION/TRAINING PROGRAM

## 2024-10-15 SDOH — ECONOMIC STABILITY: FOOD INSECURITY: WITHIN THE PAST 12 MONTHS, YOU WORRIED THAT YOUR FOOD WOULD RUN OUT BEFORE YOU GOT MONEY TO BUY MORE.: NEVER TRUE

## 2024-10-15 SDOH — ECONOMIC STABILITY: FOOD INSECURITY: WITHIN THE PAST 12 MONTHS, THE FOOD YOU BOUGHT JUST DIDN'T LAST AND YOU DIDN'T HAVE MONEY TO GET MORE.: NEVER TRUE

## 2024-10-15 SDOH — ECONOMIC STABILITY: INCOME INSECURITY: HOW HARD IS IT FOR YOU TO PAY FOR THE VERY BASICS LIKE FOOD, HOUSING, MEDICAL CARE, AND HEATING?: NOT HARD AT ALL

## 2024-10-15 ASSESSMENT — PATIENT HEALTH QUESTIONNAIRE - PHQ9
SUM OF ALL RESPONSES TO PHQ QUESTIONS 1-9: 0
2. FEELING DOWN, DEPRESSED OR HOPELESS: NOT AT ALL
SUM OF ALL RESPONSES TO PHQ QUESTIONS 1-9: 0
SUM OF ALL RESPONSES TO PHQ9 QUESTIONS 1 & 2: 0
1. LITTLE INTEREST OR PLEASURE IN DOING THINGS: NOT AT ALL

## 2024-10-15 ASSESSMENT — ENCOUNTER SYMPTOMS
SHORTNESS OF BREATH: 0
SORE THROAT: 0
COUGH: 0

## 2024-10-15 NOTE — PROGRESS NOTES
RM:    No chief complaint on file.      Fasting No    There were no vitals filed for this visit.          No data to display                \"Have you been to the ER, urgent care clinic since your last visit?  Hospitalized since your last visit?\"    NO    “Have you seen or consulted any other health care providers outside of [facility] since your last visit?”    NO          Click Here for Release of Records Request   AVS  education, follow up, and recommendations provided and addressed with patient.  services used to advise patient no  
Ear: Tympanic membrane, ear canal and external ear normal.      Nose: Nose normal.      Mouth/Throat:      Mouth: Mucous membranes are moist.      Pharynx: Oropharynx is clear.   Eyes:      Extraocular Movements: Extraocular movements intact.      Conjunctiva/sclera: Conjunctivae normal.   Cardiovascular:      Rate and Rhythm: Normal rate and regular rhythm.      Pulses: Normal pulses.      Heart sounds: Normal heart sounds.   Pulmonary:      Effort: Pulmonary effort is normal. No respiratory distress.      Breath sounds: Normal breath sounds.   Abdominal:      General: Abdomen is flat.      Palpations: Abdomen is soft.      Tenderness: There is no abdominal tenderness.      Hernia: A hernia (ventral) is present.   Musculoskeletal:      Right lower leg: No edema.      Left lower leg: No edema.   Skin:     General: Skin is warm and dry.   Neurological:      General: No focal deficit present.      Mental Status: He is alert.   Psychiatric:         Mood and Affect: Mood normal.         Behavior: Behavior normal.       I have reviewed patient medical and social history and medications.  I have reviewed pertinent labs results and other data. I have discussed the diagnosis with the patient and the intended plan as seen in the above orders. The patient has received an after-visit summary and questions were answered concerning future plans. I have discussed medication side effects and warnings with the patient as well.    Terrance Collier MD  10/15/24

## 2024-10-15 NOTE — TELEPHONE ENCOUNTER
Attempted to contact patient regarding referral for ventral hernia. Patient was unavailable to schedule at current time. Will return call tomorrow.

## 2024-10-17 ENCOUNTER — TELEPHONE (OUTPATIENT)
Dept: PRIMARY CARE CLINIC | Facility: CLINIC | Age: 74
End: 2024-10-17

## 2024-10-17 NOTE — TELEPHONE ENCOUNTER
Called pt to cancel their 10am medicare wellness appointment due to them having a new PCP. The patient was frustrated with the fact the appointment had to be cancelled. I explained to the patient to the best of my ability that due to them having a new PCP named Salvatore Man is no longer their PCP. Patient's name and  verified.

## 2024-10-18 ENCOUNTER — LAB (OUTPATIENT)
Age: 74
End: 2024-10-18

## 2024-10-18 DIAGNOSIS — E55.9 VITAMIN D DEFICIENCY: ICD-10-CM

## 2024-10-18 LAB — 25(OH)D3 SERPL-MCNC: 38.6 NG/ML (ref 30–100)

## 2024-10-21 ENCOUNTER — OFFICE VISIT (OUTPATIENT)
Age: 74
End: 2024-10-21
Payer: MEDICARE

## 2024-10-21 VITALS
SYSTOLIC BLOOD PRESSURE: 130 MMHG | WEIGHT: 172 LBS | RESPIRATION RATE: 20 BRPM | OXYGEN SATURATION: 98 % | DIASTOLIC BLOOD PRESSURE: 69 MMHG | TEMPERATURE: 98.3 F | BODY MASS INDEX: 25.48 KG/M2 | HEIGHT: 69 IN | HEART RATE: 58 BPM

## 2024-10-21 DIAGNOSIS — K43.2 INCISIONAL HERNIA, WITHOUT OBSTRUCTION OR GANGRENE: Primary | ICD-10-CM

## 2024-10-21 PROCEDURE — 3017F COLORECTAL CA SCREEN DOC REV: CPT | Performed by: SURGERY

## 2024-10-21 PROCEDURE — 1123F ACP DISCUSS/DSCN MKR DOCD: CPT | Performed by: SURGERY

## 2024-10-21 PROCEDURE — 3078F DIAST BP <80 MM HG: CPT | Performed by: SURGERY

## 2024-10-21 PROCEDURE — 99203 OFFICE O/P NEW LOW 30 MIN: CPT | Performed by: SURGERY

## 2024-10-21 PROCEDURE — 1036F TOBACCO NON-USER: CPT | Performed by: SURGERY

## 2024-10-21 PROCEDURE — G8419 CALC BMI OUT NRM PARAM NOF/U: HCPCS | Performed by: SURGERY

## 2024-10-21 PROCEDURE — G8427 DOCREV CUR MEDS BY ELIG CLIN: HCPCS | Performed by: SURGERY

## 2024-10-21 PROCEDURE — 3075F SYST BP GE 130 - 139MM HG: CPT | Performed by: SURGERY

## 2024-10-21 PROCEDURE — G8484 FLU IMMUNIZE NO ADMIN: HCPCS | Performed by: SURGERY

## 2024-10-21 ASSESSMENT — PATIENT HEALTH QUESTIONNAIRE - PHQ9
1. LITTLE INTEREST OR PLEASURE IN DOING THINGS: SEVERAL DAYS
2. FEELING DOWN, DEPRESSED OR HOPELESS: NOT AT ALL
SUM OF ALL RESPONSES TO PHQ QUESTIONS 1-9: 1
SUM OF ALL RESPONSES TO PHQ9 QUESTIONS 1 & 2: 1
SUM OF ALL RESPONSES TO PHQ QUESTIONS 1-9: 1

## 2024-10-21 NOTE — PROGRESS NOTES
Identified patient with two patient identifiers (name and ). Reviewed chart in preparation for visit and have obtained necessary documentation.    Melecio Overton is a 74 y.o. male  Chief Complaint   Patient presents with    Hernia     Ventral hernia     /69 (Site: Left Upper Arm, Position: Sitting, Cuff Size: Large Adult)   Pulse 58   Temp 98.3 °F (36.8 °C)   Resp 20   Ht 1.753 m (5' 9\")   Wt 78 kg (172 lb)   SpO2 98%   BMI 25.40 kg/m²     1. Have you been to the ER, urgent care clinic since your last visit?  Hospitalized since your last visit?new patient    2. Have you seen or consulted any other health care providers outside of the [facility] since your last visit?  Include any pap smears or colon screening. New patient

## 2024-10-21 NOTE — PROGRESS NOTES
[facility] History and Physical    History of Present Illness:      Melecio Overton is a 74 y.o. male who has noticed a bulge in the upper midline of the abdomen.  He said it does not cause any pain or difficulties.  He has noticed over the last 3 to 4 months.  The bulge has gotten a little bit bigger.  He has a history of a laparoscopic inguinal hernia repair which I did a number of years ago and also has a history of CABG.    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    Uveitis of right eye 4/14/2022       Past Surgical History:   Procedure Laterality Date    COLONOSCOPY      COLONOSCOPY N/A 7/19/2016    COLONOSCOPY performed by Liang Alvarez MD at [facility] ENDOSCOPY    COLONOSCOPY      no polyps 2006    CORONARY ARTERY BYPASS GRAFT      x 5 arteries     CYST REMOVAL  1975    HERNIA REPAIR Right 06/04/2020    inguinal hernia    OTHER SURGICAL HISTORY      pilonidal cyst removed    TONSILLECTOMY      UROLOGICAL SURGERY      CYSTOSCOPY, HAD BLADDER \"POLYP\" REMOVED         Current Outpatient Medications:     atorvastatin (LIPITOR) 80 MG tablet, Take 1 tablet by mouth nightly, Disp: 90 tablet, Rfl: 1    atenolol (TENORMIN) 50 MG tablet, Take 1 tablet by mouth nightly, Disp: 90 tablet, Rfl: 1    b complex vitamins capsule, Take 1 capsule by mouth daily, Disp: , Rfl:     VITAMIN D PO, Take by mouth, Disp: , Rfl:     FOLIC

## 2024-10-28 ENCOUNTER — HOSPITAL ENCOUNTER (OUTPATIENT)
Age: 74
Discharge: HOME | End: 2024-10-31
Payer: MEDICARE

## 2024-10-28 DIAGNOSIS — K43.2 INCISIONAL HERNIA, WITHOUT OBSTRUCTION OR GANGRENE: ICD-10-CM

## 2024-10-28 PROCEDURE — 74177 CT ABD & PELVIS W/CONTRAST: CPT

## 2024-10-28 PROCEDURE — 6360000004 HC RX CONTRAST MEDICATION: Performed by: SURGERY

## 2024-10-28 RX ORDER — IOPAMIDOL 755 MG/ML
100 INJECTION, SOLUTION INTRAVASCULAR
Status: COMPLETED | OUTPATIENT
Start: 2024-10-28 | End: 2024-10-28

## 2024-10-28 RX ADMIN — IOPAMIDOL 100 ML: 755 INJECTION, SOLUTION INTRAVENOUS at 10:56

## 2024-10-31 ENCOUNTER — TELEPHONE (OUTPATIENT)
Age: 74
End: 2024-10-31

## 2024-10-31 NOTE — TELEPHONE ENCOUNTER
Melecio Chris Clinical Staff (supporting Cam Beavers III, MD)2 hours ago (11:43 AM)     [place]  Dr. Sami Collier, [facility] surgeon, is recommending a procedure to repair a ventral hernia.  He asked me to request a cardiology clearance and have it faxed to his office ([phone]). What is required?  Do I need to have an appointment before this can happen?  Thanks.

## 2024-10-31 NOTE — TELEPHONE ENCOUNTER
I called the patient to discuss the CT scan.  He has an upper midline hernia just below the sternum and xiphoid process likely from chest tubes from a previous cardiac surgery.  He appears to have 2 small hernias the hernias are about 2.5 to 3 cm wide and each of the hernias are about 1.5 cm tall.  The hernias spanned a total area of about 5 cm.  There just appears to be preperitoneal fat going into the defects.  Due to the defects being relatively small and being in the upper midline where there is a lot of fatty tissue I think a preperitoneal robotic approach should be adequate.  I will schedule him for robotic incisional hernia repair with mesh and plan on using a preperitoneal technique for repair.  He will need cardiac clearance as well.  I have posted him for surgery.    Sami Collier

## 2024-10-31 NOTE — TELEPHONE ENCOUNTER
Please advise if okay for surgery or if appointment needed prior. Clearance likely to include holding aspirin. If okay, how many days can he hold?    Will fax clearance and send pt message in [place].

## 2024-11-01 ENCOUNTER — TELEPHONE (OUTPATIENT)
Age: 74
End: 2024-11-01

## 2024-11-01 ENCOUNTER — PREP FOR PROCEDURE (OUTPATIENT)
Age: 74
End: 2024-11-01

## 2024-11-01 PROBLEM — K43.2 INCISIONAL HERNIA: Status: ACTIVE | Noted: 2024-11-01

## 2024-11-01 NOTE — TELEPHONE ENCOUNTER
Contacted patient regarding scheduling surgery with Dr. Collier. Offered the dates of 11/22, 11/25, and 11/29 and patient wanted to do it on 12/05. Informed patient that PAT will be reaching out as well as a surgical letter will be sent out, patient understood.

## 2024-11-04 NOTE — TELEPHONE ENCOUNTER
Ok for surgery without special precautions or further cardiac testing. Can hold aspirin for 7 days if needed

## 2024-11-06 SDOH — HEALTH STABILITY: PHYSICAL HEALTH: ON AVERAGE, HOW MANY MINUTES DO YOU ENGAGE IN EXERCISE AT THIS LEVEL?: 60 MIN

## 2024-11-06 SDOH — HEALTH STABILITY: PHYSICAL HEALTH: ON AVERAGE, HOW MANY DAYS PER WEEK DO YOU ENGAGE IN MODERATE TO STRENUOUS EXERCISE (LIKE A BRISK WALK)?: 2 DAYS

## 2024-11-06 ASSESSMENT — LIFESTYLE VARIABLES
HOW MANY STANDARD DRINKS CONTAINING ALCOHOL DO YOU HAVE ON A TYPICAL DAY: 1 OR 2
HOW OFTEN DO YOU HAVE A DRINK CONTAINING ALCOHOL: 5
HOW OFTEN DO YOU HAVE A DRINK CONTAINING ALCOHOL: 4 OR MORE TIMES A WEEK
HAS A RELATIVE, FRIEND, DOCTOR, OR ANOTHER HEALTH PROFESSIONAL EXPRESSED CONCERN ABOUT YOUR DRINKING OR SUGGESTED YOU CUT DOWN: NO
HOW MANY STANDARD DRINKS CONTAINING ALCOHOL DO YOU HAVE ON A TYPICAL DAY: 1
HOW OFTEN DURING THE LAST YEAR HAVE YOU FOUND THAT YOU WERE NOT ABLE TO STOP DRINKING ONCE YOU HAD STARTED: NEVER
HOW OFTEN DO YOU HAVE SIX OR MORE DRINKS ON ONE OCCASION: 1
HOW OFTEN DURING THE LAST YEAR HAVE YOU FOUND THAT YOU WERE NOT ABLE TO STOP DRINKING ONCE YOU HAD STARTED: NEVER
HOW OFTEN DURING THE LAST YEAR HAVE YOU BEEN UNABLE TO REMEMBER WHAT HAPPENED THE NIGHT BEFORE BECAUSE YOU HAD BEEN DRINKING: NEVER
HOW OFTEN DURING THE LAST YEAR HAVE YOU NEEDED AN ALCOHOLIC DRINK FIRST THING IN THE MORNING TO GET YOURSELF GOING AFTER A NIGHT OF HEAVY DRINKING: NEVER
HAS A RELATIVE, FRIEND, DOCTOR, OR ANOTHER HEALTH PROFESSIONAL EXPRESSED CONCERN ABOUT YOUR DRINKING OR SUGGESTED YOU CUT DOWN: NO
HAVE YOU OR SOMEONE ELSE BEEN INJURED AS A RESULT OF YOUR DRINKING: NO
HAVE YOU OR SOMEONE ELSE BEEN INJURED AS A RESULT OF YOUR DRINKING: NO
HOW OFTEN DURING THE LAST YEAR HAVE YOU HAD A FEELING OF GUILT OR REMORSE AFTER DRINKING: NEVER
HOW OFTEN DURING THE LAST YEAR HAVE YOU FAILED TO DO WHAT WAS NORMALLY EXPECTED FROM YOU BECAUSE OF DRINKING: NEVER
HOW OFTEN DURING THE LAST YEAR HAVE YOU HAD A FEELING OF GUILT OR REMORSE AFTER DRINKING: NEVER
HOW OFTEN DURING THE LAST YEAR HAVE YOU NEEDED AN ALCOHOLIC DRINK FIRST THING IN THE MORNING TO GET YOURSELF GOING AFTER A NIGHT OF HEAVY DRINKING: NEVER
HOW OFTEN DURING THE LAST YEAR HAVE YOU FAILED TO DO WHAT WAS NORMALLY EXPECTED FROM YOU BECAUSE OF DRINKING: NEVER
HOW OFTEN DURING THE LAST YEAR HAVE YOU BEEN UNABLE TO REMEMBER WHAT HAPPENED THE NIGHT BEFORE BECAUSE YOU HAD BEEN DRINKING: NEVER

## 2024-11-06 ASSESSMENT — PATIENT HEALTH QUESTIONNAIRE - PHQ9
1. LITTLE INTEREST OR PLEASURE IN DOING THINGS: NOT AT ALL
SUM OF ALL RESPONSES TO PHQ QUESTIONS 1-9: 0
2. FEELING DOWN, DEPRESSED OR HOPELESS: NOT AT ALL
SUM OF ALL RESPONSES TO PHQ9 QUESTIONS 1 & 2: 0
SUM OF ALL RESPONSES TO PHQ QUESTIONS 1-9: 0

## 2024-11-07 ENCOUNTER — OFFICE VISIT (OUTPATIENT)
Age: 74
End: 2024-11-07

## 2024-11-07 VITALS
DIASTOLIC BLOOD PRESSURE: 83 MMHG | RESPIRATION RATE: 16 BRPM | SYSTOLIC BLOOD PRESSURE: 134 MMHG | BODY MASS INDEX: 24.91 KG/M2 | WEIGHT: 168.2 LBS | HEART RATE: 71 BPM | OXYGEN SATURATION: 98 % | HEIGHT: 69 IN | TEMPERATURE: 98.4 F

## 2024-11-07 DIAGNOSIS — Z71.89 ACP (ADVANCE CARE PLANNING): ICD-10-CM

## 2024-11-07 DIAGNOSIS — I10 PRIMARY HYPERTENSION: ICD-10-CM

## 2024-11-07 DIAGNOSIS — Z00.00 MEDICARE ANNUAL WELLNESS VISIT, SUBSEQUENT: Primary | ICD-10-CM

## 2024-11-07 ASSESSMENT — PATIENT HEALTH QUESTIONNAIRE - PHQ9
2. FEELING DOWN, DEPRESSED OR HOPELESS: NOT AT ALL
1. LITTLE INTEREST OR PLEASURE IN DOING THINGS: NOT AT ALL
SUM OF ALL RESPONSES TO PHQ QUESTIONS 1-9: 0
SUM OF ALL RESPONSES TO PHQ9 QUESTIONS 1 & 2: 0

## 2024-11-07 ASSESSMENT — LIFESTYLE VARIABLES
HOW OFTEN DO YOU HAVE A DRINK CONTAINING ALCOHOL: 4 OR MORE TIMES A WEEK
HOW OFTEN DURING THE LAST YEAR HAVE YOU BEEN UNABLE TO REMEMBER WHAT HAPPENED THE NIGHT BEFORE BECAUSE YOU HAD BEEN DRINKING: NEVER
HOW OFTEN DURING THE LAST YEAR HAVE YOU NEEDED AN ALCOHOLIC DRINK FIRST THING IN THE MORNING TO GET YOURSELF GOING AFTER A NIGHT OF HEAVY DRINKING: NEVER
HAVE YOU OR SOMEONE ELSE BEEN INJURED AS A RESULT OF YOUR DRINKING: NO
HOW OFTEN DURING THE LAST YEAR HAVE YOU HAD A FEELING OF GUILT OR REMORSE AFTER DRINKING: NEVER
HOW MANY STANDARD DRINKS CONTAINING ALCOHOL DO YOU HAVE ON A TYPICAL DAY: 1 OR 2
HOW OFTEN DURING THE LAST YEAR HAVE YOU FOUND THAT YOU WERE NOT ABLE TO STOP DRINKING ONCE YOU HAD STARTED: NEVER
HOW OFTEN DURING THE LAST YEAR HAVE YOU FAILED TO DO WHAT WAS NORMALLY EXPECTED FROM YOU BECAUSE OF DRINKING: NEVER
HAS A RELATIVE, FRIEND, DOCTOR, OR ANOTHER HEALTH PROFESSIONAL EXPRESSED CONCERN ABOUT YOUR DRINKING OR SUGGESTED YOU CUT DOWN: NO

## 2024-11-07 NOTE — ACP (ADVANCE CARE PLANNING)
Advance Care Planning     Advance Care Planning (ACP) Physician/NP/PA Conversation    Date of Conversation: 11/7/2024  Conducted with: Patient with Decision Making Capacity    Healthcare Decision Maker:      Primary Decision Maker: Evita Overton - [facility] - [phone]    Click here to complete Healthcare Decision Makers including selection of the Healthcare Decision Maker Relationship (ie \"Primary\")  Today we documented Decision Maker(s) consistent with Legal Next of Kin hierarchy.    Care Preferences:    Hospitalization:  \"If your health worsens and it becomes clear that your chance of recovery is unlikely, what would be your preference regarding hospitalization?\"  The patient would prefer comfort-focused treatment without hospitalization.    Ventilation:  \"If you were unable to breath on your own and your chance of recovery was unlikely, what would be your preference about the use of a ventilator (breathing machine) if it was available to you?\"  The patient would NOT desire the use of a ventilator.    Resuscitation:  \"In the event your heart stopped as a result of an underlying serious health condition, would you want attempts made to restart your heart, or would you prefer a natural death?\"  Yes, attempt to resuscitate.    treatment goals, ventilation preferences, hospitalization preferences, and resuscitation preferences    Conversation Outcomes / Follow-Up Plan:  ACP incomplete - refer to ACP Clinical Specialist  Reviewed DNR/DNI and patient elects Full Code (Attempt Resuscitation)    Has AD on file, UTD.    Length of Voluntary ACP Conversation in minutes:  <16 minutes (Non-Billable)    Terrance Collier MD

## 2024-11-07 NOTE — PROGRESS NOTES
RM:    Chief Complaint   Patient presents with    Medicare AWV     Pt stated he has some hoarseness, for couple of months, nor msoreness       Fasting No    There were no vitals filed for this visit.          No data to display                \"Have you been to the ER, urgent care clinic since your last visit?  Hospitalized since your last visit?\"    NO    “Have you seen or consulted any other health care providers outside of [facility] since your last visit?”    NO          Click Here for Release of Records Request   AVS  education, follow up, and recommendations provided and addressed with patient.  services used to advise patient no

## 2024-11-07 NOTE — PROGRESS NOTES
Medicare Annual Wellness Visit    Melecio Overton is here for Medicare AWV (Pt stated he has some hoarseness, for couple of months, nor msoreness)    Assessment & Plan   Medicare annual wellness visit, subsequent  Primary hypertension  -     CBC; Future  ACP (advance care planning)  -     [facility] -  Referral to ACP Clinical Specialist    Recommendations for Preventive Services Due: see orders and patient instructions/AVS.  Recommended screening schedule for the next 5-10 years is provided to the patient in written form: see Patient Instructions/AVS.     Return in about 11 months (around 10/1/2025) for Medicare AWV.       Subjective   The following acute and/or chronic problems were also addressed today:  None    Patient's complete Health Risk Assessment and screening values have been reviewed and are found in Flowsheets. The following problems were reviewed today and where indicated follow up appointments were made and/or referrals ordered.    Positive Risk Factor Screenings with Interventions:              Inactivity:  On average, how many days per week do you engage in moderate to strenuous exercise (like a brisk walk)?: 2 days (!) Abnormal  On average, how many minutes do you engage in exercise at this level?: 60 min  Interventions:  Has fitness center in neighborhood, wants to go more often         Safety:  Do you have any tripping hazards - loose or unsecured carpets or rugs?: (!) Yes  Do you have non-slip mats or non-slip surfaces or shower bars or grab bars in your shower or bathtub?: (!) No  Interventions:  Had some purchased, not yet installed               Objective   Vitals:    11/07/24 1318   BP: 134/83   Site: Left Upper Arm   Position: Sitting   Cuff Size: Medium Adult   Pulse: 71   Resp: 16   Temp: 98.4 °F (36.9 °C)   TempSrc: Oral   SpO2: 98%   Weight: 76.3 kg (168 lb 3.2 oz)   Height: 1.753 m (5' 9\")      Body mass index is 24.84 kg/m².      Physical Exam  Vitals and nursing note reviewed.

## 2024-11-07 NOTE — PATIENT INSTRUCTIONS
Learning About Being Active as an Older Adult  Why is being active important as you get older?     Being active is one of the best things you can do for your health. And it's never too late to start. Being active--or getting active, if you aren't already--has definite benefits. It can:  Give you more energy,  Keep your mind sharp.  Improve balance to reduce your risk of falls.  Help you manage chronic illness with fewer medicines.  No matter how old you are, how fit you are, or what health problems you have, there is a form of activity that will work for you. And the more physical activity you can do, the better your overall health will be.  What kinds of activity can help you stay healthy?  Being more active will make your daily activities easier. Physical activity includes planned exercise and things you do in daily life. There are four types of activity:  Aerobic.  Doing aerobic activity makes your heart and lungs strong.  Includes walking, dancing, and gardening.  Aim for at least 2½ hours spread throughout the week.  It improves your energy and can help you sleep better.  Muscle-strengthening.  This type of activity can help maintain muscle and strengthen bones.  Includes climbing stairs, using resistance bands, and lifting or carrying heavy loads.  Aim for at least twice a week.  It can help protect the knees and other joints.  Stretching.  Stretching gives you better range of motion in joints and muscles.  Includes upper arm stretches, calf stretches, and gentle yoga.  Aim for at least twice a week, preferably after your muscles are warmed up from other activities.  It can help you function better in daily life.  Balancing.  This helps you stay coordinated and have good posture.  Includes heel-to-toe walking, servando chi, and certain types of yoga.  Aim for at least 3 days a week.  It can reduce your risk of falling.  Even if you have a hard time meeting the recommendations, it's better to be more active

## 2024-11-08 ENCOUNTER — CLINICAL DOCUMENTATION (OUTPATIENT)
Dept: SPIRITUAL SERVICES | Age: 74
End: 2024-11-08

## 2024-11-08 NOTE — PROGRESS NOTES
Outcomes:                [] 3rd -  Date:                Intervention:  [] Spoke with Patient   [] Left Voice mail [] Email / Mail    [] MyChart  [] Other (Specify) :       Outcomes:           []  Additional Outreach -  Date:     (Specify Dates & special circumstances):    Outcomes:         Thank you for this referral.

## 2024-11-18 ENCOUNTER — HOSPITAL ENCOUNTER (OUTPATIENT)
Facility: HOSPITAL | Age: 74
Discharge: HOME | End: 2024-11-21
Payer: MEDICARE

## 2024-11-18 VITALS
TEMPERATURE: 97.6 F | BODY MASS INDEX: 25.15 KG/M2 | RESPIRATION RATE: 18 BRPM | WEIGHT: 169.8 LBS | DIASTOLIC BLOOD PRESSURE: 67 MMHG | HEIGHT: 69 IN | HEART RATE: 54 BPM | SYSTOLIC BLOOD PRESSURE: 107 MMHG

## 2024-11-18 LAB
BASOPHILS # BLD: 0 K/UL (ref 0–0.1)
BASOPHILS NFR BLD: 1 % (ref 0–1)
DIFFERENTIAL METHOD BLD: ABNORMAL
EKG ATRIAL RATE: 55 BPM
EKG DIAGNOSIS: NORMAL
EKG P AXIS: 21 DEGREES
EKG P-R INTERVAL: 144 MS
EKG Q-T INTERVAL: 418 MS
EKG QRS DURATION: 100 MS
EKG QTC CALCULATION (BAZETT): 399 MS
EKG R AXIS: -5 DEGREES
EKG T AXIS: 19 DEGREES
EKG VENTRICULAR RATE: 55 BPM
EOSINOPHIL # BLD: 0.1 K/UL (ref 0–0.4)
EOSINOPHIL NFR BLD: 2 % (ref 0–7)
ERYTHROCYTE [DISTWIDTH] IN BLOOD BY AUTOMATED COUNT: 13.1 % (ref 11.5–14.5)
HCT VFR BLD AUTO: 43.5 % (ref 36.6–50.3)
HGB BLD-MCNC: 14.5 G/DL (ref 12.1–17)
IMM GRANULOCYTES # BLD AUTO: 0 K/UL (ref 0–0.04)
IMM GRANULOCYTES NFR BLD AUTO: 0 % (ref 0–0.5)
LYMPHOCYTES # BLD: 1.8 K/UL (ref 0.8–3.5)
LYMPHOCYTES NFR BLD: 29 % (ref 12–49)
MCH RBC QN AUTO: 30.3 PG (ref 26–34)
MCHC RBC AUTO-ENTMCNC: 33.3 G/DL (ref 30–36.5)
MCV RBC AUTO: 90.8 FL (ref 80–99)
MONOCYTES # BLD: 0.9 K/UL (ref 0–1)
MONOCYTES NFR BLD: 15 % (ref 5–13)
NEUTS SEG # BLD: 3.2 K/UL (ref 1.8–8)
NEUTS SEG NFR BLD: 53 % (ref 32–75)
NRBC # BLD: 0 K/UL (ref 0–0.01)
NRBC BLD-RTO: 0 PER 100 WBC
PLATELET # BLD AUTO: 185 K/UL (ref 150–400)
PMV BLD AUTO: 10.8 FL (ref 8.9–12.9)
RBC # BLD AUTO: 4.79 M/UL (ref 4.1–5.7)
WBC # BLD AUTO: 6.1 K/UL (ref 4.1–11.1)

## 2024-11-18 PROCEDURE — 36415 COLL VENOUS BLD VENIPUNCTURE: CPT

## 2024-11-18 PROCEDURE — 85025 COMPLETE CBC W/AUTO DIFF WBC: CPT

## 2024-11-18 PROCEDURE — 93005 ELECTROCARDIOGRAM TRACING: CPT | Performed by: SURGERY

## 2024-11-18 NOTE — PERIOP NOTE
[address] [address]   MAIN OR                                  [phone]    MAIN PRE OP             [phone]                                                                                AMBULATORY PRE OP          [phone]  PRE-ADMISSION TESTING    [phone]     Surgery Date:  12/2/24      Is surgery arrival time given by surgeon?  YES  NO    If “NO”, [facility] staff will call you between 4 and 7pm the day before your surgery with your arrival time. (If your surgery is on a Monday, we will call you the Friday before.)    Call [phone] after 7pm Monday-Friday if you did not receive this call.    INSTRUCTIONS BEFORE YOUR SURGERY   When You  Arrive Arrive at [facility] Patient Access on 1st floor the day of your surgery.  Have your insurance card, photo ID,living will/advanced directive/POA (if applicable),  and any copayment (if needed)   Food   and   Drink NO solid food after midnight the night before surgery. You can drink clear liquids from midnight until ONE hour prior to your arrival at the hospital on the day of your surgery. Clear liquids include:  Water  Fruit juices without pulp (NO ORANGE JUICE)  Carbonated beverages  Black coffee(no cream/milk)  Tea(no cream/milk)  Gatorade    No alcohol (beer, wine, liquor) or marijuana (smoking) 24 hours, edibles (3 days). Stop smoking cigarettes 14 days before surgery (helps w/healing and breathing).   Medications to   TAKE   Morning of Surgery MEDICATIONS TO TAKE THE MORNING OF SURGERY WITH A SIP OF WATER: ATENOLOL,  ATORVASTATIN  You may take these medications, IF NEEDED, the morning of surgery: NONE  Ask your surgeon/prescribing doctor for instructions on taking or stopping these medications prior to surgery: NONE   Medications to STOP  before surgery Non-Steroidal anti-inflammatory Drugs (NSAID's): for example, Diclofenac (Voltaren), Ibuprofen (Advil, Motrin), Naproxen (Aleve)

## 2024-11-21 ENCOUNTER — TELEPHONE (OUTPATIENT)
Age: 74
End: 2024-11-21

## 2024-11-21 NOTE — TELEPHONE ENCOUNTER
Paige from [facility] ENT called to see if the patient can take augmentin 875 mg by mouth twice a day for 14 days. The patient states he is unable to take any other medication other than the two he is currently taking.    States you can leave message if neccessary

## 2024-11-21 NOTE — TELEPHONE ENCOUNTER
Returned call to Paige with ENT but office was closed for lunch and forwarded to the voice message box. Left message to call the office back.

## 2024-11-22 ENCOUNTER — CLINICAL DOCUMENTATION (OUTPATIENT)
Dept: CASE MANAGEMENT | Age: 74
End: 2024-11-22

## 2024-11-22 NOTE — TELEPHONE ENCOUNTER
Touched base with our Nurse Practitioner NADJA Crow regarding the message and she advised the following:  He is fine to take the augmentin but if symptoms worsen or it becomes respiratory we may need to postpone.  Ghada       Returned call to Paige at [facility] spoke with Paige and informed her of the above information. She states she is unsure if Dr. العراقي is aware that the patient will be having surgery but will forward this information to Dr. العراقي to see how she may which to proceed.

## 2024-11-22 NOTE — TELEPHONE ENCOUNTER
Returned call to Paige verified patient using 2 patient identifiers. She states that the patient was seen by Dr. العراقي yesterday. She wants to prescribe him Augmentin 875 mg BID for 14 days for Nasal Pharyngitis but he said that he was not able to take any other medications but the ones that were prescribed by Dr. Collier.    I reviewed the chart and noted that Dr. Sami Collier has not prescribed him any medications but he is due to have surgery for a hernia repair on 12/2. His PCP is also Dr. Terrance Collier and he may have meant to give him the PCP's name. I will make our Provider aware of this call regarding this but she may want to reach out to this PCP's office.    She states that he mentioned Sami Collier but she will touch base with the PCP as well.

## 2024-11-22 NOTE — ACP (ADVANCE CARE PLANNING)
Advance Care Planning     Advance Care Planning Clinical Specialist  Conversation Note      Date of ACP Conversation: 11/22/2024    Conversation Conducted with: Patient with Decision Making Capacity    ACP Clinical Specialist: Tatiana Rivera LCSW    Healthcare Decision Maker:     Current Designated Healthcare Decision Maker:     Primary Decision Maker: Evita Overton - Spouse - [phone]    Secondary Decision Maker: Deborah Mishra - Child - [phone]    Secondary Decision Maker: Tian-RanulfoRosy abbott - Child - [phone]    Care Preferences    Ventilation:  \"If you were in your present state of health and suddenly became very ill and were unable to breathe on your own, what would your preference be about the use of a ventilator (breathing machine) if it were available to you?\"      If the patient would desire the use of ventilator (breathing machine), answer \"yes\".  If not, \"no\": yes    \"If your health worsens and it becomes clear that your chance of recovery is unlikely, what would your preference be about the use of a ventilator (breathing machine) if it were available to you?\"     Would the patient desire the use of ventilator (breathing machine)?: No      Resuscitation  \"CPR works best to restart the heart when there is a sudden event, like a heart attack, in someone who is otherwise healthy. Unfortunately, CPR does not typically restart the heart for people who have serious health conditions or who are very sick.\"    \"In the event your heart stopped as a result of an underlying serious health condition, would you want attempts to be made to restart your heart (answer \"yes\" for attempt to resuscitate) or would you prefer a natural death (answer \"no\" for do not attempt to resuscitate)?\" yes       [x] Yes   [] No   Educated Patient / Decision Maker regarding differences between Advance Directives and portable DNR orders.    Length of ACP Conversation in minutes:  15    Conversation

## 2024-11-25 ENCOUNTER — TELEPHONE (OUTPATIENT)
Age: 74
End: 2024-11-25

## 2024-11-25 NOTE — TELEPHONE ENCOUNTER
Called patient regarding new date for surgery. Informed him that a new date would more than likely be after Christmas, patient stated he would like to keep 12/02.

## 2024-12-02 ENCOUNTER — ANESTHESIA (OUTPATIENT)
Facility: HOSPITAL | Age: 74
End: 2024-12-02
Payer: MEDICARE

## 2024-12-02 ENCOUNTER — HOSPITAL ENCOUNTER (OUTPATIENT)
Facility: HOSPITAL | Age: 74
Setting detail: OUTPATIENT SURGERY
Discharge: HOME | End: 2024-12-02
Attending: SURGERY | Admitting: SURGERY
Payer: MEDICARE

## 2024-12-02 ENCOUNTER — ANESTHESIA EVENT (OUTPATIENT)
Facility: HOSPITAL | Age: 74
End: 2024-12-02
Payer: MEDICARE

## 2024-12-02 VITALS
DIASTOLIC BLOOD PRESSURE: 79 MMHG | BODY MASS INDEX: 25.14 KG/M2 | WEIGHT: 169.75 LBS | HEIGHT: 69 IN | RESPIRATION RATE: 10 BRPM | TEMPERATURE: 98.1 F | HEART RATE: 55 BPM | SYSTOLIC BLOOD PRESSURE: 136 MMHG | OXYGEN SATURATION: 96 %

## 2024-12-02 DIAGNOSIS — K43.2 INCISIONAL HERNIA, WITHOUT OBSTRUCTION OR GANGRENE: Primary | ICD-10-CM

## 2024-12-02 PROCEDURE — 3600000009 HC SURGERY ROBOT BASE: Performed by: SURGERY

## 2024-12-02 PROCEDURE — 6360000002 HC RX W HCPCS: Performed by: NURSE ANESTHETIST, CERTIFIED REGISTERED

## 2024-12-02 PROCEDURE — 2500000003 HC RX 250 WO HCPCS: Performed by: NURSE ANESTHETIST, CERTIFIED REGISTERED

## 2024-12-02 PROCEDURE — S2900 ROBOTIC SURGICAL SYSTEM: HCPCS | Performed by: SURGERY

## 2024-12-02 PROCEDURE — C1781 MESH (IMPLANTABLE): HCPCS | Performed by: SURGERY

## 2024-12-02 PROCEDURE — 2500000003 HC RX 250 WO HCPCS: Performed by: SURGERY

## 2024-12-02 PROCEDURE — 3700000001 HC ADD 15 MINUTES (ANESTHESIA): Performed by: SURGERY

## 2024-12-02 PROCEDURE — 6370000000 HC RX 637 (ALT 250 FOR IP): Performed by: ANESTHESIOLOGY

## 2024-12-02 PROCEDURE — 2580000003 HC RX 258: Performed by: NURSE ANESTHETIST, CERTIFIED REGISTERED

## 2024-12-02 PROCEDURE — 6360000002 HC RX W HCPCS: Performed by: ANESTHESIOLOGY

## 2024-12-02 PROCEDURE — 7100000000 HC PACU RECOVERY - FIRST 15 MIN: Performed by: SURGERY

## 2024-12-02 PROCEDURE — 2709999900 HC NON-CHARGEABLE SUPPLY: Performed by: SURGERY

## 2024-12-02 PROCEDURE — 2580000003 HC RX 258: Performed by: ANESTHESIOLOGY

## 2024-12-02 PROCEDURE — 7100000001 HC PACU RECOVERY - ADDTL 15 MIN: Performed by: SURGERY

## 2024-12-02 PROCEDURE — 3700000000 HC ANESTHESIA ATTENDED CARE: Performed by: SURGERY

## 2024-12-02 PROCEDURE — 49593 RPR AA HRN 1ST 3-10 RDC: CPT | Performed by: SURGERY

## 2024-12-02 PROCEDURE — 3600000019 HC SURGERY ROBOT ADDTL 15MIN: Performed by: SURGERY

## 2024-12-02 RX ORDER — NALOXONE HYDROCHLORIDE 0.4 MG/ML
INJECTION, SOLUTION INTRAMUSCULAR; INTRAVENOUS; SUBCUTANEOUS PRN
Status: DISCONTINUED | OUTPATIENT
Start: 2024-12-02 | End: 2024-12-02 | Stop reason: HOSPADM

## 2024-12-02 RX ORDER — SODIUM CHLORIDE 0.9 % (FLUSH) 0.9 %
5-40 SYRINGE (ML) INJECTION PRN
Status: DISCONTINUED | OUTPATIENT
Start: 2024-12-02 | End: 2024-12-02 | Stop reason: HOSPADM

## 2024-12-02 RX ORDER — CEFAZOLIN SODIUM 1 G/3ML
INJECTION, POWDER, FOR SOLUTION INTRAMUSCULAR; INTRAVENOUS
Status: DISCONTINUED | OUTPATIENT
Start: 2024-12-02 | End: 2024-12-02 | Stop reason: SDUPTHER

## 2024-12-02 RX ORDER — KETOROLAC TROMETHAMINE 30 MG/ML
INJECTION, SOLUTION INTRAMUSCULAR; INTRAVENOUS
Status: DISCONTINUED | OUTPATIENT
Start: 2024-12-02 | End: 2024-12-02 | Stop reason: SDUPTHER

## 2024-12-02 RX ORDER — OXYCODONE AND ACETAMINOPHEN 5; 325 MG/1; MG/1
1 TABLET ORAL EVERY 6 HOURS PRN
Qty: 20 TABLET | Refills: 0 | Status: SHIPPED | OUTPATIENT
Start: 2024-12-02 | End: 2024-12-09

## 2024-12-02 RX ORDER — FENTANYL CITRATE 50 UG/ML
25 INJECTION, SOLUTION INTRAMUSCULAR; INTRAVENOUS EVERY 5 MIN PRN
Status: DISCONTINUED | OUTPATIENT
Start: 2024-12-02 | End: 2024-12-02 | Stop reason: HOSPADM

## 2024-12-02 RX ORDER — BUPIVACAINE HYDROCHLORIDE AND EPINEPHRINE 5; 5 MG/ML; UG/ML
INJECTION, SOLUTION EPIDURAL; INTRACAUDAL; PERINEURAL PRN
Status: DISCONTINUED | OUTPATIENT
Start: 2024-12-02 | End: 2024-12-02 | Stop reason: HOSPADM

## 2024-12-02 RX ORDER — GLYCOPYRROLATE 0.2 MG/ML
INJECTION INTRAMUSCULAR; INTRAVENOUS
Status: DISCONTINUED | OUTPATIENT
Start: 2024-12-02 | End: 2024-12-02 | Stop reason: SDUPTHER

## 2024-12-02 RX ORDER — IBUPROFEN 800 MG/1
800 TABLET, FILM COATED ORAL EVERY 6 HOURS PRN
Qty: 30 TABLET | Refills: 0 | Status: SHIPPED | OUTPATIENT
Start: 2024-12-02

## 2024-12-02 RX ORDER — ACETAMINOPHEN 500 MG
1000 TABLET ORAL ONCE
Status: COMPLETED | OUTPATIENT
Start: 2024-12-02 | End: 2024-12-02

## 2024-12-02 RX ORDER — ROCURONIUM BROMIDE 10 MG/ML
INJECTION, SOLUTION INTRAVENOUS
Status: DISCONTINUED | OUTPATIENT
Start: 2024-12-02 | End: 2024-12-02 | Stop reason: SDUPTHER

## 2024-12-02 RX ORDER — MIDAZOLAM HYDROCHLORIDE 2 MG/2ML
2 INJECTION, SOLUTION INTRAMUSCULAR; INTRAVENOUS PRN
Status: DISCONTINUED | OUTPATIENT
Start: 2024-12-02 | End: 2024-12-02 | Stop reason: HOSPADM

## 2024-12-02 RX ORDER — SODIUM CHLORIDE 0.9 % (FLUSH) 0.9 %
5-40 SYRINGE (ML) INJECTION EVERY 12 HOURS SCHEDULED
Status: DISCONTINUED | OUTPATIENT
Start: 2024-12-02 | End: 2024-12-02 | Stop reason: HOSPADM

## 2024-12-02 RX ORDER — SODIUM CHLORIDE, SODIUM LACTATE, POTASSIUM CHLORIDE, CALCIUM CHLORIDE 600; 310; 30; 20 MG/100ML; MG/100ML; MG/100ML; MG/100ML
INJECTION, SOLUTION INTRAVENOUS
Status: DISCONTINUED | OUTPATIENT
Start: 2024-12-02 | End: 2024-12-02 | Stop reason: SDUPTHER

## 2024-12-02 RX ORDER — PHENYLEPHRINE HCL IN 0.9% NACL 0.4MG/10ML
SYRINGE (ML) INTRAVENOUS
Status: DISCONTINUED | OUTPATIENT
Start: 2024-12-02 | End: 2024-12-02 | Stop reason: SDUPTHER

## 2024-12-02 RX ORDER — ONDANSETRON 2 MG/ML
4 INJECTION INTRAMUSCULAR; INTRAVENOUS
Status: COMPLETED | OUTPATIENT
Start: 2024-12-02 | End: 2024-12-02

## 2024-12-02 RX ORDER — SODIUM CHLORIDE, SODIUM LACTATE, POTASSIUM CHLORIDE, CALCIUM CHLORIDE 600; 310; 30; 20 MG/100ML; MG/100ML; MG/100ML; MG/100ML
INJECTION, SOLUTION INTRAVENOUS CONTINUOUS
Status: DISCONTINUED | OUTPATIENT
Start: 2024-12-02 | End: 2024-12-02 | Stop reason: HOSPADM

## 2024-12-02 RX ORDER — SODIUM CHLORIDE 9 MG/ML
INJECTION, SOLUTION INTRAVENOUS PRN
Status: DISCONTINUED | OUTPATIENT
Start: 2024-12-02 | End: 2024-12-02 | Stop reason: HOSPADM

## 2024-12-02 RX ORDER — DEXAMETHASONE SODIUM PHOSPHATE 4 MG/ML
INJECTION, SOLUTION INTRA-ARTICULAR; INTRALESIONAL; INTRAMUSCULAR; INTRAVENOUS; SOFT TISSUE
Status: DISCONTINUED | OUTPATIENT
Start: 2024-12-02 | End: 2024-12-02 | Stop reason: SDUPTHER

## 2024-12-02 RX ORDER — LIDOCAINE HYDROCHLORIDE 10 MG/ML
1 INJECTION, SOLUTION EPIDURAL; INFILTRATION; INTRACAUDAL; PERINEURAL
Status: DISCONTINUED | OUTPATIENT
Start: 2024-12-02 | End: 2024-12-02 | Stop reason: HOSPADM

## 2024-12-02 RX ORDER — MIDAZOLAM HYDROCHLORIDE 1 MG/ML
INJECTION, SOLUTION INTRAMUSCULAR; INTRAVENOUS
Status: DISCONTINUED | OUTPATIENT
Start: 2024-12-02 | End: 2024-12-02 | Stop reason: SDUPTHER

## 2024-12-02 RX ORDER — ONDANSETRON 2 MG/ML
INJECTION INTRAMUSCULAR; INTRAVENOUS
Status: DISCONTINUED | OUTPATIENT
Start: 2024-12-02 | End: 2024-12-02 | Stop reason: SDUPTHER

## 2024-12-02 RX ORDER — DEXMEDETOMIDINE HYDROCHLORIDE 100 UG/ML
INJECTION, SOLUTION INTRAVENOUS
Status: DISCONTINUED | OUTPATIENT
Start: 2024-12-02 | End: 2024-12-02 | Stop reason: SDUPTHER

## 2024-12-02 RX ORDER — FENTANYL CITRATE 50 UG/ML
INJECTION, SOLUTION INTRAMUSCULAR; INTRAVENOUS
Status: DISCONTINUED | OUTPATIENT
Start: 2024-12-02 | End: 2024-12-02 | Stop reason: SDUPTHER

## 2024-12-02 RX ORDER — FENTANYL CITRATE 50 UG/ML
100 INJECTION, SOLUTION INTRAMUSCULAR; INTRAVENOUS
Status: DISCONTINUED | OUTPATIENT
Start: 2024-12-02 | End: 2024-12-02 | Stop reason: HOSPADM

## 2024-12-02 RX ORDER — HYDRALAZINE HYDROCHLORIDE 20 MG/ML
10 INJECTION INTRAMUSCULAR; INTRAVENOUS ONCE
Status: DISCONTINUED | OUTPATIENT
Start: 2024-12-02 | End: 2024-12-02 | Stop reason: HOSPADM

## 2024-12-02 RX ORDER — SUCCINYLCHOLINE/SOD CL,ISO/PF 200MG/10ML
SYRINGE (ML) INTRAVENOUS
Status: DISCONTINUED | OUTPATIENT
Start: 2024-12-02 | End: 2024-12-02 | Stop reason: SDUPTHER

## 2024-12-02 RX ORDER — HYDROMORPHONE HYDROCHLORIDE 1 MG/ML
0.5 INJECTION, SOLUTION INTRAMUSCULAR; INTRAVENOUS; SUBCUTANEOUS EVERY 5 MIN PRN
Status: DISCONTINUED | OUTPATIENT
Start: 2024-12-02 | End: 2024-12-02 | Stop reason: HOSPADM

## 2024-12-02 RX ORDER — PROCHLORPERAZINE EDISYLATE 5 MG/ML
5 INJECTION INTRAMUSCULAR; INTRAVENOUS
Status: DISCONTINUED | OUTPATIENT
Start: 2024-12-02 | End: 2024-12-02 | Stop reason: HOSPADM

## 2024-12-02 RX ORDER — OXYCODONE HYDROCHLORIDE 5 MG/1
5 TABLET ORAL
Status: DISCONTINUED | OUTPATIENT
Start: 2024-12-02 | End: 2024-12-02 | Stop reason: HOSPADM

## 2024-12-02 RX ORDER — LIDOCAINE HYDROCHLORIDE 20 MG/ML
INJECTION, SOLUTION EPIDURAL; INFILTRATION; INTRACAUDAL; PERINEURAL
Status: DISCONTINUED | OUTPATIENT
Start: 2024-12-02 | End: 2024-12-02 | Stop reason: SDUPTHER

## 2024-12-02 RX ORDER — ONDANSETRON 2 MG/ML
4 INJECTION INTRAMUSCULAR; INTRAVENOUS AS NEEDED
Status: DISCONTINUED | OUTPATIENT
Start: 2024-12-02 | End: 2024-12-02 | Stop reason: HOSPADM

## 2024-12-02 RX ADMIN — ROCURONIUM BROMIDE 35 MG: 10 INJECTION, SOLUTION INTRAVENOUS at 10:40

## 2024-12-02 RX ADMIN — ROCURONIUM BROMIDE 10 MG: 10 INJECTION, SOLUTION INTRAVENOUS at 12:15

## 2024-12-02 RX ADMIN — SUGAMMADEX 200 MG: 100 INJECTION, SOLUTION INTRAVENOUS at 12:26

## 2024-12-02 RX ADMIN — ROCURONIUM BROMIDE 10 MG: 10 INJECTION, SOLUTION INTRAVENOUS at 11:01

## 2024-12-02 RX ADMIN — SODIUM CHLORIDE, POTASSIUM CHLORIDE, SODIUM LACTATE AND CALCIUM CHLORIDE: 600; 310; 30; 20 INJECTION, SOLUTION INTRAVENOUS at 10:30

## 2024-12-02 RX ADMIN — FENTANYL CITRATE 50 MCG: 50 INJECTION, SOLUTION INTRAMUSCULAR; INTRAVENOUS at 10:55

## 2024-12-02 RX ADMIN — ROCURONIUM BROMIDE 10 MG: 10 INJECTION, SOLUTION INTRAVENOUS at 11:48

## 2024-12-02 RX ADMIN — FENTANYL CITRATE 25 MCG: 50 INJECTION INTRAMUSCULAR; INTRAVENOUS at 14:10

## 2024-12-02 RX ADMIN — LIDOCAINE HYDROCHLORIDE 80 MG: 20 INJECTION, SOLUTION EPIDURAL; INFILTRATION; INTRACAUDAL; PERINEURAL at 10:35

## 2024-12-02 RX ADMIN — Medication 200 MCG: at 10:43

## 2024-12-02 RX ADMIN — ROCURONIUM BROMIDE 5 MG: 10 INJECTION, SOLUTION INTRAVENOUS at 10:35

## 2024-12-02 RX ADMIN — ROCURONIUM BROMIDE 10 MG: 10 INJECTION, SOLUTION INTRAVENOUS at 11:30

## 2024-12-02 RX ADMIN — KETOROLAC TROMETHAMINE 15 MG: 30 INJECTION, SOLUTION INTRAMUSCULAR at 12:26

## 2024-12-02 RX ADMIN — Medication 140 MG: at 10:35

## 2024-12-02 RX ADMIN — Medication 60 MCG: at 12:02

## 2024-12-02 RX ADMIN — PROPOFOL 150 MG: 10 INJECTION, EMULSION INTRAVENOUS at 10:35

## 2024-12-02 RX ADMIN — DEXMEDETOMIDINE 10 MCG: 100 INJECTION, SOLUTION, CONCENTRATE INTRAVENOUS at 12:29

## 2024-12-02 RX ADMIN — PHENYLEPHRINE HYDROCHLORIDE 40 MCG/MIN: 10 INJECTION INTRAVENOUS at 10:41

## 2024-12-02 RX ADMIN — DEXAMETHASONE SODIUM PHOSPHATE 4 MG: 4 INJECTION, SOLUTION INTRAMUSCULAR; INTRAVENOUS at 10:35

## 2024-12-02 RX ADMIN — FENTANYL CITRATE 25 MCG: 50 INJECTION INTRAMUSCULAR; INTRAVENOUS at 14:20

## 2024-12-02 RX ADMIN — SODIUM CHLORIDE, POTASSIUM CHLORIDE, SODIUM LACTATE AND CALCIUM CHLORIDE: 600; 310; 30; 20 INJECTION, SOLUTION INTRAVENOUS at 10:08

## 2024-12-02 RX ADMIN — ONDANSETRON 4 MG: 2 INJECTION INTRAMUSCULAR; INTRAVENOUS at 12:26

## 2024-12-02 RX ADMIN — MIDAZOLAM 2 MG: 1 INJECTION INTRAMUSCULAR; INTRAVENOUS at 10:30

## 2024-12-02 RX ADMIN — CEFAZOLIN 2 G: 330 INJECTION, POWDER, FOR SOLUTION INTRAMUSCULAR; INTRAVENOUS at 10:49

## 2024-12-02 RX ADMIN — GLYCOPYRROLATE 0.2 MG: 0.2 INJECTION INTRAMUSCULAR; INTRAVENOUS at 10:43

## 2024-12-02 RX ADMIN — Medication 60 MCG: at 12:05

## 2024-12-02 RX ADMIN — FENTANYL CITRATE 50 MCG: 50 INJECTION, SOLUTION INTRAMUSCULAR; INTRAVENOUS at 10:35

## 2024-12-02 RX ADMIN — ACETAMINOPHEN 1000 MG: 500 TABLET ORAL at 09:57

## 2024-12-02 RX ADMIN — ONDANSETRON 4 MG: 2 INJECTION INTRAMUSCULAR; INTRAVENOUS at 14:56

## 2024-12-02 ASSESSMENT — PAIN - FUNCTIONAL ASSESSMENT: PAIN_FUNCTIONAL_ASSESSMENT: 0-10

## 2024-12-02 NOTE — FLOWSHEET NOTE
12/02/24 1155   Family Communication    Relationship to Patient Spouse    Phone Number Evita Overton [phone]   Family/Significant Other Update Called   Delivery Origin Nurse   Message Disposition Family present - message delivered   Update Given Yes   Family Communication   Family Update Message Procedure started;Surgeon working       
No

## 2024-12-02 NOTE — ANESTHESIA POSTPROCEDURE EVALUATION
Department of Anesthesiology  Postprocedure Note    Patient: Melecio Overton  MRN: [id number]  YOB: 1950  Date of evaluation: 12/2/2024    Procedure Summary       Date: 12/02/24 Room / Location: [facility] MAIN OR [address] [place] [address] MAIN OR    Anesthesia Start: 1030 Anesthesia Stop: 1241    Procedure: ROBOTIC INCISIONAL HERNIA REPAIR WITH MESH (Abdomen) Diagnosis:       Incisional hernia      (Incisional hernia [K43.2])    Providers: Sami Collier MD Responsible Provider: Tee Tesfaye MD    Anesthesia Type: General ASA Status: 3            Anesthesia Type: General    Diamond Phase I:      Diamond Phase II:      Anesthesia Post Evaluation    Patient location during evaluation: PACU  Patient participation: complete - patient participated  Level of consciousness: responsive to verbal stimuli and sleepy but conscious  Pain score: 2  Airway patency: patent  Cardiovascular status: blood pressure returned to baseline  Respiratory status: acceptable  Hydration status: stable  Comments: +Post-Anesthesia Evaluation and Assessment    Patient: Melecio Overton MRN: [id number]  SSN: [id number]   YOB: 1950  Age: 74 y.o.  Sex: male          Cardiovascular Function/Vital Signs    /65   Pulse 60   Temp 98.1 °F (36.7 °C) (Oral)   Resp 16   Ht 1.753 m (5' 9\")   Wt 77 kg (169 lb 12.1 oz)   SpO2 97%   BMI 25.07 kg/m²     Patient is status post Procedure(s):  ROBOTIC INCISIONAL HERNIA REPAIR WITH MESH.    Nausea/Vomiting: Controlled.    Postoperative hydration reviewed and adequate.    Pain:      Managed.    Neurological Status:       At baseline.    Mental Status and Level of Consciousness: Arousable.    Pulmonary Status:       Adequate oxygenation and airway patent.    Complications related to anesthesia: None    Post-anesthesia assessment completed. No concerns.    I have evaluated the patient and the patient is stable and ready to be discharged from PACU .    Signed By: Tee Tesfaye MD

## 2024-12-02 NOTE — ANESTHESIA PRE PROCEDURE
Department of Anesthesiology  Preprocedure Note       Name:  Melecio Overton   Age:  74 y.o.  :  1950                                          MRN:  [id number]         Date:  2024      Surgeon: Surgeon(s):  Sami Collier MD    Procedure: Procedure(s):  ROBOTIC INCISIONAL HERNIA REPAIR WITH MESH    Medications prior to admission:   Prior to Admission medications    Medication Sig Start Date End Date Taking? Authorizing Provider   atorvastatin (LIPITOR) 80 MG tablet Take 1 tablet by mouth nightly  Patient taking differently: Take 1 tablet by mouth daily 24  Yes Mary Alice Chase DO   atenolol (TENORMIN) 50 MG tablet Take 1 tablet by mouth nightly  Patient taking differently: Take 1 tablet by mouth daily 24  Yes Mary Alice Chase DO   b complex vitamins capsule Take 1 capsule by mouth daily    Provider, MD Debbie   VITAMIN D PO Take by mouth    ProviderDebbie MD   FOLIC ACID PO Take by mouth daily    Automatic Reconciliation, Ar   Multiple Vitamin (MULTIVITAMIN PO) Take 1 tablet by mouth daily    Automatic Reconciliation, Ar   aspirin 325 MG tablet Take by mouth daily    Automatic Reconciliation, Ar   coenzyme Q10 200 MG CAPS capsule Take by mouth daily 10/10/12   Automatic Reconciliation, Ar       Current medications:    Current Facility-Administered Medications   Medication Dose Route Frequency Provider Last Rate Last Admin   • lidocaine PF 1 % injection 1 mL  1 mL IntraDERmal Once PRN Tee Tesfaye MD       • acetaminophen (TYLENOL) tablet 1,000 mg  1,000 mg Oral Once Tee Tesfaye MD       • fentaNYL (SUBLIMAZE) injection 100 mcg  100 mcg IntraVENous Once PRN Tee Tesfaye MD       • ondansetron (ZOFRAN) injection 4 mg  4 mg IntraVENous PRN Tee Tesfaye MD       • lactated ringers infusion   IntraVENous Continuous Tee Tesfaye MD       • sodium chloride flush 0.9 % injection 5-40 mL  5-40 mL IntraVENous 2 times per day Tee Tesfaye MD       • sodium chloride flush

## 2024-12-02 NOTE — DISCHARGE INSTRUCTIONS
[facility]   Post Operative Instructions      Please call your surgeons  office for a 2 week follow-up appointment with Dr Collier .  Office address is:    [facility] [place] [address]  [phone]      Discharge Instructions:    You may resume your usual diet as well as your usual medications.      You are not cleared to drive if you are taking narcotic pain medication.  If you are only using tylenol for pain and are comfortable sitting in a car, you may drive.    No heavy lifting.  No strenuous exercise.  You are cleared to go up and down stairs.  You may shower but no baths or swimming.      Your incisions dont need any special care.  You can wash them gently with  warm soap and water daily and pat them dry.  Your stitches are under the skin and dont need to be removed.    Ask you doctor when you can return to work.      Call our office at  [phone] to make a 2 week follow up appointment.  Call our office with any problems, questions or concerns.  Call our office if you have any     Fevers of 101 or higher   Worsening pain  Nausea/Vomiting  Difficulty eating or drinking  Incisions that are red, open, draining or tender.

## 2024-12-02 NOTE — H&P
[facility] [place] [facility] History and Physical     History of Present Illness:      Melecio Overton is a 74 y.o. male who has noticed a bulge in the upper midline of the abdomen.  He said it does not cause any pain or difficulties.  He has noticed over the last 3 to 4 months.  The bulge has gotten a little bit bigger.  He has a history of a laparoscopic inguinal hernia repair which I did a number of years ago and also has a history of CABG.     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    Uveitis of right eye 4/14/2022            Past Surgical History         Past Surgical History:   Procedure Laterality Date    COLONOSCOPY        COLONOSCOPY N/A 7/19/2016     COLONOSCOPY performed by Liang Alvarez MD at [facility] ENDOSCOPY    COLONOSCOPY         no polyps 2006    CORONARY ARTERY BYPASS GRAFT         x 5 arteries     CYST REMOVAL   1975    HERNIA REPAIR Right 06/04/2020     inguinal hernia    OTHER SURGICAL HISTORY         pilonidal cyst removed    TONSILLECTOMY        UROLOGICAL SURGERY         CYSTOSCOPY, HAD BLADDER \"POLYP\" REMOVED              Current Medication      Current Outpatient Medications:     atorvastatin (LIPITOR) 80 MG tablet, Take 1 tablet by mouth nightly, Disp: 90 tablet, Rfl: 1    atenolol (TENORMIN) 50 MG tablet, Take 1 tablet by mouth nightly, Disp: 90 tablet, Rfl: 1    b

## 2024-12-02 NOTE — BRIEF OP NOTE
Brief Postoperative Note      Patient: Melecio Overton  YOB: 1950  MRN: [id number]    Date of Procedure: 12/2/2024    Pre-Op Diagnosis Codes:      * Incisional hernia [K43.2]    Post-Op Diagnosis: Same       Procedure(s):  ROBOTIC INCISIONAL HERNIA REPAIR WITH MESH    Surgeon(s):  Sami Collier MD    Assistant:  Surgical Assistant: Ana Cantu    Anesthesia: General    Estimated Blood Loss (mL): Minimal    Complications: None    Specimens:   * No specimens in log *    Implants:  Implant Name Type Inv. Item Serial No.  Lot No. LRB No. Used Action   MESH SEAN [id number] POLYPR SQ L PORE MFIL SFT KNIT - SNA  MESH SEAN [id number] POLYPR SQ L PORE MFIL SFT KNIT NA BARD DAVOL-WD [id number] N/A 1 Implanted         Drains: * No LDAs found *    Findings:  Infection Present At Time Of Surgery (PATOS) (choose all levels that have infection present):  No infection present  Other Findings: 2 small hernias in an total hernia area of 3x4cm.  Lower larger hernia was 3x1.5cm then 1cm hernia defect at the zyphoid process.  Hernias repaired primarily and with [id number] Bard Soft Mesh placed in the stephanie shape preperitoneal    Electronically signed by Sami Collier MD on 12/2/2024 at 12:26 PM

## 2024-12-02 NOTE — OP NOTE
insufflated to 15 mmHg.  There were no injuries to the underlying abdominal structures from the trocar placement.  We then placed another 8 mm trocar in the left mid abdomen and then placed another one in the left lower quadrant just off midline.  We then had the trocars placed.  We could see our defect.  We would dock the da Yves robot to the patient and then dissecting the preperitoneal plane about 5 to 6 cm away from the hernia defect and dissecting the preperitoneal hernia plane all the way up to the xiphoid process area where the hernia was.  We dissected the preperitoneal plane starting first horizontally going from medial to the left upper quadrant in an oblique fashion.  We then dissected up into the preperitoneal plane superiorly all the way up to hernia defect, which was a cm or 2 below the xiphoid process.  The hernia defect contents were reduced.  There was just some preperitoneal fat and falciform ligament going up into the defect.  We reduced that and cleared off the fascia and cleared off the peritoneal tissue underneath with the defect exposed.  It measured about 3 cm wide by about 1.5 cm tall.  We then would dissect superiorly knowing that we had another smaller hernia defect right near the xiphoid process.  We dissected superiorly, dissecting the peritoneum off the diaphragmatic fibers and exposing the small hernia defect around the area of the xiphoid process that went on the right and left side of the xiphoid process.  The defect was somewhat ill-defined and very small and had the xiphoid process in the middle of it.  We decided at this point to not primarily repair the defect at the xiphoid process because the bone was into the defect and would be impossible to close, but it is very small and should be covered by the mesh, which will be anchored to the substernal xiphoid process area, which would cover that defect very well.  We continued our dissection about 3 to 4 cm above that substernal

## 2024-12-05 ENCOUNTER — HOSPITAL ENCOUNTER (OUTPATIENT)
Age: 74
Discharge: HOME | End: 2024-12-08
Payer: MEDICARE

## 2024-12-05 DIAGNOSIS — J32.0 CHRONIC MAXILLARY SINUSITIS: ICD-10-CM

## 2024-12-05 PROCEDURE — 70486 CT MAXILLOFACIAL W/O DYE: CPT

## 2024-12-13 ENCOUNTER — TELEMEDICINE (OUTPATIENT)
Age: 74
End: 2024-12-13

## 2024-12-13 DIAGNOSIS — K43.2 INCISIONAL HERNIA, WITHOUT OBSTRUCTION OR GANGRENE: ICD-10-CM

## 2024-12-13 DIAGNOSIS — Z09 POSTOP CHECK: Primary | ICD-10-CM

## 2024-12-13 ASSESSMENT — PATIENT HEALTH QUESTIONNAIRE - PHQ9
1. LITTLE INTEREST OR PLEASURE IN DOING THINGS: NOT AT ALL
SUM OF ALL RESPONSES TO PHQ QUESTIONS 1-9: 0
SUM OF ALL RESPONSES TO PHQ QUESTIONS 1-9: 0
SUM OF ALL RESPONSES TO PHQ9 QUESTIONS 1 & 2: 0
SUM OF ALL RESPONSES TO PHQ QUESTIONS 1-9: 0
SUM OF ALL RESPONSES TO PHQ QUESTIONS 1-9: 0
2. FEELING DOWN, DEPRESSED OR HOPELESS: NOT AT ALL

## 2024-12-13 NOTE — PROGRESS NOTES
Identified patient with two patient identifiers (name and ). Reviewed chart in preparation for visit and have obtained necessary documentation.    Melecio Overton is a 74 y.o. male  Chief Complaint   Patient presents with    Post-Op Check     S/P ROBOTIC INCISIONAL HERNIA REPAIR WITH MESH     There were no vitals taken for this visit.    1. Have you been to the ER, urgent care clinic since your last visit?  Hospitalized since your last visit?no    2. Have you seen or consulted any other health care providers outside of the [facility] since your last visit?  Include any pap smears or colon screening. no

## 2024-12-13 NOTE — PROGRESS NOTES
Subjective:      Melecio Overton is a 74 y.o. male presents for postop care 11 days status post incision hernia repair.    Appetite is good. Eating a regular diet without difficulty.   Bowel movements are regular.  The patient is voiding without difficulty.  The patient is not having any pain..        Mr. Overton has a reminder for a \"due or due soon\" health maintenance. I have asked that he contact his primary care provider for follow-up on this health maintenance.      Objective:     There were no vitals taken for this visit.    General:  alert, cooperative       Incision:   healing well, per patient     Assessment:     Doing well postoperatively.    Plan:   Follow up as needed  May increase activity as tolerated.   No lifting greater than 20 lbs x1 week then may increase by 10 lbs each week thereafter.   May get incision wet.     DANISHA Dunham NP  Melecio Overton, was evaluated through a synchronous (real-time) audio-video encounter. The patient (or guardian if applicable) is aware that this is a billable service, which includes applicable co-pays. This Virtual Visit was conducted with patient's (and/or legal guardian's) consent. Patient identification was verified, and a caregiver was present when appropriate.   The patient was located at Home: [address] [place] [id number]  Provider was located at Facility (Appt Dept): [address] [facility] [address]  [place] [address]  Confirm you are appropriately licensed, registered, or certified to deliver care in the state where the patient is located as indicated above. If you are not or unsure, please re-schedule the visit: Yes, I confirm.        --DANISHA Dunham NP on 12/13/2024 at 10:56 AM    An electronic signature was used to authenticate this note.

## 2025-01-02 ENCOUNTER — TRANSCRIBE ORDERS (OUTPATIENT)
Age: 75
End: 2025-01-02

## 2025-01-02 ENCOUNTER — HOSPITAL ENCOUNTER (OUTPATIENT)
Age: 75
Discharge: HOME | End: 2025-01-02
Payer: MEDICARE

## 2025-01-02 DIAGNOSIS — R05.9 COUGH, UNSPECIFIED TYPE: Primary | ICD-10-CM

## 2025-01-02 DIAGNOSIS — R05.9 COUGH, UNSPECIFIED TYPE: ICD-10-CM

## 2025-01-02 PROCEDURE — 71046 X-RAY EXAM CHEST 2 VIEWS: CPT

## 2025-02-18 DIAGNOSIS — E78.5 HYPERLIPIDEMIA WITH TARGET LDL LESS THAN 70: ICD-10-CM

## 2025-02-18 DIAGNOSIS — I48.0 PAF (PAROXYSMAL ATRIAL FIBRILLATION) (HCC): ICD-10-CM

## 2025-02-18 DIAGNOSIS — I10 ESSENTIAL (PRIMARY) HYPERTENSION: ICD-10-CM

## 2025-02-18 DIAGNOSIS — I77.9 CAROTID ARTERY DISEASE WITHOUT CEREBRAL INFARCTION: ICD-10-CM

## 2025-02-18 RX ORDER — ATORVASTATIN CALCIUM 80 MG/1
80 TABLET, FILM COATED ORAL DAILY
Qty: 90 TABLET | Refills: 3 | Status: SHIPPED | OUTPATIENT
Start: 2025-02-18

## 2025-02-18 RX ORDER — ATENOLOL 50 MG/1
50 TABLET ORAL DAILY
Qty: 90 TABLET | Refills: 3 | Status: SHIPPED | OUTPATIENT
Start: 2025-02-18

## 2025-02-18 NOTE — TELEPHONE ENCOUNTER
Requested Prescriptions     Signed Prescriptions Disp Refills    atorvastatin (LIPITOR) 80 MG tablet 90 tablet 3     Sig: Take 1 tablet by mouth daily     Authorizing Provider: SAGAR BEAVERS III     Ordering User: STEPHAN TRIMBLE    atenolol (TENORMIN) 50 MG tablet 90 tablet 3     Sig: Take 1 tablet by mouth daily     Authorizing Provider: SAGAR BEAVERS III     Ordering User: STEPHAN TRIMBLE      Future Appointments   Date Time Provider Department Center   3/4/2025  1:00 PM Sagar Beavers III, MD [facility]      Per Verbal Order by MD/NP

## 2025-02-26 ENCOUNTER — HOSPITAL ENCOUNTER (OUTPATIENT)
Facility: HOSPITAL | Age: 75
Discharge: HOME | End: 2025-03-01

## 2025-02-26 DIAGNOSIS — E78.2 MIXED HYPERLIPIDEMIA: ICD-10-CM

## 2025-02-26 DIAGNOSIS — I10 PRIMARY HYPERTENSION: ICD-10-CM

## 2025-02-26 DIAGNOSIS — R73.09 OTHER ABNORMAL GLUCOSE: ICD-10-CM

## 2025-02-26 DIAGNOSIS — I25.10 CORONARY ARTERY DISEASE INVOLVING NATIVE CORONARY ARTERY OF NATIVE HEART WITHOUT ANGINA PECTORIS: ICD-10-CM

## 2025-02-26 LAB
ERYTHROCYTE [DISTWIDTH] IN BLOOD BY AUTOMATED COUNT: 13 % (ref 11.5–14.5)
HCT VFR BLD AUTO: 46.8 % (ref 36.6–50.3)
HGB BLD-MCNC: 15.3 G/DL (ref 12.1–17)
MCH RBC QN AUTO: 30 PG (ref 26–34)
MCHC RBC AUTO-ENTMCNC: 32.7 G/DL (ref 30–36.5)
MCV RBC AUTO: 91.8 FL (ref 80–99)
NRBC # BLD: 0 K/UL (ref 0–0.01)
NRBC BLD-RTO: 0 PER 100 WBC
PLATELET # BLD AUTO: 184 K/UL (ref 150–400)
PMV BLD AUTO: 11.4 FL (ref 8.9–12.9)
RBC # BLD AUTO: 5.1 M/UL (ref 4.1–5.7)
WBC # BLD AUTO: 7.9 K/UL (ref 4.1–11.1)

## 2025-02-27 ENCOUNTER — OFFICE VISIT (OUTPATIENT)
Age: 75
End: 2025-02-27
Payer: MEDICARE

## 2025-02-27 VITALS
SYSTOLIC BLOOD PRESSURE: 132 MMHG | BODY MASS INDEX: 25.18 KG/M2 | WEIGHT: 170 LBS | OXYGEN SATURATION: 97 % | HEART RATE: 62 BPM | DIASTOLIC BLOOD PRESSURE: 80 MMHG | HEIGHT: 69 IN

## 2025-02-27 DIAGNOSIS — I10 ESSENTIAL (PRIMARY) HYPERTENSION: ICD-10-CM

## 2025-02-27 DIAGNOSIS — I48.0 PAF (PAROXYSMAL ATRIAL FIBRILLATION) (HCC): ICD-10-CM

## 2025-02-27 DIAGNOSIS — I25.10 CORONARY ARTERY DISEASE INVOLVING NATIVE CORONARY ARTERY OF NATIVE HEART WITHOUT ANGINA PECTORIS: Primary | ICD-10-CM

## 2025-02-27 DIAGNOSIS — E78.5 HYPERLIPIDEMIA WITH TARGET LDL LESS THAN 70: ICD-10-CM

## 2025-02-27 LAB
ALBUMIN SERPL-MCNC: 3.9 G/DL (ref 3.5–5)
ALBUMIN/GLOB SERPL: 1.4 (ref 1.1–2.2)
ALP SERPL-CCNC: 72 U/L (ref 45–117)
ALT SERPL-CCNC: 29 U/L (ref 12–78)
ANION GAP SERPL CALC-SCNC: 4 MMOL/L (ref 2–12)
AST SERPL-CCNC: 33 U/L (ref 15–37)
BILIRUB SERPL-MCNC: 0.4 MG/DL (ref 0.2–1)
BUN SERPL-MCNC: 18 MG/DL (ref 6–20)
BUN/CREAT SERPL: 17 (ref 12–20)
CALCIUM SERPL-MCNC: 9.2 MG/DL (ref 8.5–10.1)
CHLORIDE SERPL-SCNC: 110 MMOL/L (ref 97–108)
CHOLEST SERPL-MCNC: 134 MG/DL
CO2 SERPL-SCNC: 26 MMOL/L (ref 21–32)
CREAT SERPL-MCNC: 1.09 MG/DL (ref 0.7–1.3)
EST. AVERAGE GLUCOSE BLD GHB EST-MCNC: 111 MG/DL
GLOBULIN SER CALC-MCNC: 2.7 G/DL (ref 2–4)
GLUCOSE SERPL-MCNC: 91 MG/DL (ref 65–100)
HBA1C MFR BLD: 5.5 % (ref 4–5.6)
HDLC SERPL-MCNC: 48 MG/DL
HDLC SERPL: 2.8 (ref 0–5)
LDLC SERPL CALC-MCNC: 61.2 MG/DL (ref 0–100)
POTASSIUM SERPL-SCNC: 4.9 MMOL/L (ref 3.5–5.1)
PROT SERPL-MCNC: 6.6 G/DL (ref 6.4–8.2)
SODIUM SERPL-SCNC: 140 MMOL/L (ref 136–145)
TRIGL SERPL-MCNC: 124 MG/DL
VLDLC SERPL CALC-MCNC: 24.8 MG/DL

## 2025-02-27 PROCEDURE — 3075F SYST BP GE 130 - 139MM HG: CPT | Performed by: SPECIALIST

## 2025-02-27 PROCEDURE — 1160F RVW MEDS BY RX/DR IN RCRD: CPT | Performed by: SPECIALIST

## 2025-02-27 PROCEDURE — G8419 CALC BMI OUT NRM PARAM NOF/U: HCPCS | Performed by: SPECIALIST

## 2025-02-27 PROCEDURE — 3017F COLORECTAL CA SCREEN DOC REV: CPT | Performed by: SPECIALIST

## 2025-02-27 PROCEDURE — 3079F DIAST BP 80-89 MM HG: CPT | Performed by: SPECIALIST

## 2025-02-27 PROCEDURE — 99214 OFFICE O/P EST MOD 30 MIN: CPT | Performed by: SPECIALIST

## 2025-02-27 PROCEDURE — 1036F TOBACCO NON-USER: CPT | Performed by: SPECIALIST

## 2025-02-27 PROCEDURE — G8427 DOCREV CUR MEDS BY ELIG CLIN: HCPCS | Performed by: SPECIALIST

## 2025-02-27 PROCEDURE — 1126F AMNT PAIN NOTED NONE PRSNT: CPT | Performed by: SPECIALIST

## 2025-02-27 PROCEDURE — 1159F MED LIST DOCD IN RCRD: CPT | Performed by: SPECIALIST

## 2025-02-27 PROCEDURE — 1123F ACP DISCUSS/DSCN MKR DOCD: CPT | Performed by: SPECIALIST

## 2025-02-27 ASSESSMENT — PATIENT HEALTH QUESTIONNAIRE - PHQ9
SUM OF ALL RESPONSES TO PHQ QUESTIONS 1-9: 0
2. FEELING DOWN, DEPRESSED OR HOPELESS: NOT AT ALL
1. LITTLE INTEREST OR PLEASURE IN DOING THINGS: NOT AT ALL
SUM OF ALL RESPONSES TO PHQ QUESTIONS 1-9: 0
SUM OF ALL RESPONSES TO PHQ9 QUESTIONS 1 & 2: 0
SUM OF ALL RESPONSES TO PHQ QUESTIONS 1-9: 0
SUM OF ALL RESPONSES TO PHQ QUESTIONS 1-9: 0

## 2025-02-27 NOTE — PROGRESS NOTES
HISTORY OF PRESENT ILLNESS  Melecio Overton is a 74 y.o. male     SUMMARY:   Patient Active Problem List   Diagnosis    Cataract    Urinary bladder papilloma    Low back pain    Carotid artery disease without cerebral infarction    Hypertension    IGT (impaired glucose tolerance)    Hyperlipidemia with target LDL less than 70    CAD (coronary artery disease)    Benign bladder papilloma    Keratitis    Vitamin D deficiency    ED (erectile dysfunction)    Advanced directives, counseling/discussion    Uveitis of right eye    Incisional hernia            CARDIOLOGY STUDIES TO DATE:  4/11 normal stress echo   6/11 life line screen , mild bilateral carotid disease   4/13 carotid dopplers 10-49% right and 0-9% left stenoses   4/15 carotid dopplers 10-49% right and 0-9% left stenoses   2/18 normal stress echo   4/19 carotid dopplers, mild bilateral stenosis, no change  12/20 neg AAA screening    5/23 carotid dopplers unchanged    6/23 normal echo  6/23 14d event monitor, rare pac,pvc, no afib    Chief Complaint   Patient presents with    Coronary Artery Disease       HPI :  He is doing great with no cardiac complaints or problems with his medications.  He has not been exercising as much as he had but weight is stable and blood pressure looks great.  Recent lipid profile looked outstanding.  He had a great cruise around the [facility] and just got back from a 4 Island visit to [place].  He wonders if he needs to continue to take folic acid.    CARDIAC ROS:   negative for chest pain, claudication, dyspnea, irregular heart beat, lower extremity edema, orthopnea, paroxysmal nocturnal dyspnea, and syncope    Family History   Problem Relation Age of Onset    No Known Problems Daughter     No Known Problems Daughter     Cataracts Sister     High Cholesterol Sister     Thyroid Disease Sister     Cataracts Father     Heart Disease Father     Other Mother         duodenal ulcer    Cataracts Mother     High

## 2025-06-17 ENCOUNTER — PATIENT MESSAGE (OUTPATIENT)
Age: 75
End: 2025-06-17

## 2025-06-17 DIAGNOSIS — M85.852 OSTEOPENIA OF NECK OF LEFT FEMUR: Primary | ICD-10-CM

## 2025-07-02 ENCOUNTER — HOSPITAL ENCOUNTER (OUTPATIENT)
Facility: HOSPITAL | Age: 75
Discharge: HOME | End: 2025-07-05
Attending: STUDENT IN AN ORGANIZED HEALTH CARE EDUCATION/TRAINING PROGRAM
Payer: MEDICARE

## 2025-07-02 DIAGNOSIS — M85.852 OSTEOPENIA OF NECK OF LEFT FEMUR: ICD-10-CM

## 2025-07-02 PROCEDURE — 77080 DXA BONE DENSITY AXIAL: CPT

## 2025-08-28 ENCOUNTER — PATIENT MESSAGE (OUTPATIENT)
Age: 75
End: 2025-08-28

## (undated) DEVICE — FILTER SMK EVAC FLO CLR MEGADYNE

## (undated) DEVICE — STERILE POLYISOPRENE POWDER-FREE SURGICAL GLOVES WITH EMOLLIENT COATING: Brand: PROTEXIS

## (undated) DEVICE — LAPAROSCOPIC TROCAR SLEEVE/SINGLE USE: Brand: KII® OPTICAL ACCESS SYSTEM

## (undated) DEVICE — Device

## (undated) DEVICE — LIQUIBAND RAPID ADHESIVE 36/CS 0.8ML: Brand: MEDLINE

## (undated) DEVICE — SUTURE V-LOC 180 SZ 0 L12IN ABSRB GRN L37MM GS-21 1/2 CIR VLOCL0316

## (undated) DEVICE — SUTURE SZ 0 27IN 5/8 CIR UR-6  TAPER PT VIOLET ABSRB VICRYL J603H

## (undated) DEVICE — GLOVE ORANGE PI 7 1/2   MSG9075

## (undated) DEVICE — SUTURE ETHIBOND EXCEL SZ 0 L36IN NONABSORBABLE GRN SH L26MM X524H

## (undated) DEVICE — REM POLYHESIVE ADULT PATIENT RETURN ELECTRODE: Brand: VALLEYLAB

## (undated) DEVICE — GARMENT,MEDLINE,DVT,INT,CALF,MED, GEN2: Brand: MEDLINE

## (undated) DEVICE — GENERAL LAPAROSCOPY - SMH: Brand: MEDLINE INDUSTRIES, INC.

## (undated) DEVICE — SOLUTION ANTIFOG VIS SYS CLEARIFY LAPSCP

## (undated) DEVICE — TROCAR: Brand: KII® OPTICAL ACCESS SYSTEM

## (undated) DEVICE — SYRINGE MED 30ML STD CLR PLAS LUERLOCK TIP N CTRL DISP

## (undated) DEVICE — GLOVE SURG SZ 8 L12IN FNGR THK79MIL GRN LTX FREE

## (undated) DEVICE — PACK,BASIC,SIRUS,V: Brand: MEDLINE

## (undated) DEVICE — SOLUTION IV 1000ML 0.9% SOD CHL

## (undated) DEVICE — SURGICAL PROCEDURE KIT GEN LAPAROSCOPY LF

## (undated) DEVICE — TIP COVER ACCESSORY

## (undated) DEVICE — NEEDLE HYPO 22GA L1.5IN BLK S STL HUB POLYPR SHLD REG BVL

## (undated) DEVICE — SOLUTION IRRIG 1000ML 0.9% SOD CHL USP POUR PLAS BTL

## (undated) DEVICE — SUTURE DEV SZ 3-0 V-LOC 90 L12IN TO L18IN CV-23 VLT VLOCM0844

## (undated) DEVICE — TUBING, SUCTION, 1/4" X 12', STRAIGHT: Brand: MEDLINE

## (undated) DEVICE — HYPODERMIC SAFETY NEEDLE: Brand: MAGELLAN

## (undated) DEVICE — 4-PORT MANIFOLD: Brand: NEPTUNE 2

## (undated) DEVICE — SUTURE MCRYL SZ 4-0 L18IN ABSRB UD L19MM PS-2 3/8 CIR PRIM Y496G

## (undated) DEVICE — STRAP,POSITIONING,KNEE/BODY,FOAM,4X60": Brand: MEDLINE

## (undated) DEVICE — TROCAR: Brand: KII® SLEEVE

## (undated) DEVICE — ARM DRAPE

## (undated) DEVICE — STAPLER INT HERN BLK TI W/ 4.8MM STPL DISP MULTFI VERSATACK

## (undated) DEVICE — INFECTION CONTROL KIT SYS

## (undated) DEVICE — SYRINGE MED 10ML LUERLOCK TIP W/O SFTY DISP

## (undated) DEVICE — DERMABOND SKIN ADH 0.7ML -- DERMABOND ADVANCED 12/BX

## (undated) DEVICE — BLADELESS OBTURATOR: Brand: WECK VISTA

## (undated) DEVICE — BARD SOFT MESH, 6" X 6" (15 CM X 15 CM)
Type: IMPLANTABLE DEVICE | Site: ABDOMEN | Status: NON-FUNCTIONAL
Brand: BARD
Removed: 2024-12-02

## (undated) DEVICE — TROCAR SITE CLOSURE DEVICE: Brand: ENDO CLOSE

## (undated) DEVICE — CLICKLINE SCISSORS INSERT: Brand: CLICKLINE

## (undated) DEVICE — X-RAY DETECTABLE SPONGES,16 PLY: Brand: VISTEC

## (undated) DEVICE — PREP SKN CHLRAPRP APL 26ML STR --

## (undated) DEVICE — DRAPE,UTILTY,TAPE,15X26, 4EA/PK: Brand: MEDLINE

## (undated) DEVICE — SUTURE MONOCRYL + SZ 4-0 L27IN ABSRB UD L19MM PS-2 3/8 CIR MCP426H